# Patient Record
Sex: FEMALE | Race: WHITE | NOT HISPANIC OR LATINO | Employment: FULL TIME | URBAN - METROPOLITAN AREA
[De-identification: names, ages, dates, MRNs, and addresses within clinical notes are randomized per-mention and may not be internally consistent; named-entity substitution may affect disease eponyms.]

---

## 2017-01-16 ENCOUNTER — TELEPHONE (OUTPATIENT)
Dept: FAMILY MEDICINE | Facility: CLINIC | Age: 22
End: 2017-01-16

## 2017-01-16 DIAGNOSIS — Z30.9 ENCOUNTER FOR CONTRACEPTIVE MANAGEMENT, UNSPECIFIED CONTRACEPTIVE ENCOUNTER TYPE: ICD-10-CM

## 2017-01-17 RX ORDER — MEDROXYPROGESTERONE ACETATE 150 MG/ML
150 INJECTION, SUSPENSION INTRAMUSCULAR
Status: DISCONTINUED | OUTPATIENT
Start: 2017-01-17 | End: 2018-02-19

## 2017-01-17 NOTE — TELEPHONE ENCOUNTER
Attempted to call pt to set up nurse visit for injection.     Can you replace the order with a new one that has an end date of 1 year as to not create an incident.    I will follow up once you send back.

## 2017-01-17 NOTE — TELEPHONE ENCOUNTER
----- Message from Goran Cantu sent at 1/16/2017  2:03 PM CST -----  Contact: Patient  Patient called requesting appointment for depo injection. Please call back at [phone]. Thanks,

## 2017-01-19 ENCOUNTER — TELEPHONE (OUTPATIENT)
Dept: FAMILY MEDICINE | Facility: CLINIC | Age: 22
End: 2017-01-19

## 2017-01-20 ENCOUNTER — CLINICAL SUPPORT (OUTPATIENT)
Dept: FAMILY MEDICINE | Facility: CLINIC | Age: 22
End: 2017-01-20
Payer: COMMERCIAL

## 2017-01-20 DIAGNOSIS — Z30.9 ENCOUNTER FOR CONTRACEPTIVE MANAGEMENT, UNSPECIFIED CONTRACEPTIVE ENCOUNTER TYPE: Primary | ICD-10-CM

## 2017-01-20 LAB
B-HCG UR QL: NEGATIVE
CTP QC/QA: YES

## 2017-01-20 PROCEDURE — 81025 URINE PREGNANCY TEST: CPT | Mod: S$GLB,,, | Performed by: NURSE PRACTITIONER

## 2017-01-20 PROCEDURE — 96372 THER/PROPH/DIAG INJ SC/IM: CPT | Mod: S$GLB,,, | Performed by: NURSE PRACTITIONER

## 2017-01-20 RX ADMIN — MEDROXYPROGESTERONE ACETATE 150 MG: 150 INJECTION, SUSPENSION INTRAMUSCULAR at 03:01

## 2017-01-20 NOTE — TELEPHONE ENCOUNTER
----- Message from Rashida Weinstein sent at 1/19/2017  2:21 PM CST -----  Contact:  call  //[phone]    Calling to   Book a  Depo  Injection// please call

## 2017-01-20 NOTE — TELEPHONE ENCOUNTER
Pt missed appt for Depo. Pt will need UPT before her injection. Nurse appt sched jose for UPT and Depo .--lp

## 2017-02-16 ENCOUNTER — OFFICE VISIT (OUTPATIENT)
Dept: RHEUMATOLOGY | Facility: CLINIC | Age: 22
End: 2017-02-16
Payer: COMMERCIAL

## 2017-02-16 VITALS
BODY MASS INDEX: 39.57 KG/M2 | SYSTOLIC BLOOD PRESSURE: 100 MMHG | DIASTOLIC BLOOD PRESSURE: 70 MMHG | HEIGHT: 63 IN | WEIGHT: 223.31 LBS | HEART RATE: 64 BPM

## 2017-02-16 DIAGNOSIS — M79.7 FIBROMYALGIA: ICD-10-CM

## 2017-02-16 PROCEDURE — 99999 PR PBB SHADOW E&M-EST. PATIENT-LVL III: CPT | Mod: PBBFAC,,, | Performed by: INTERNAL MEDICINE

## 2017-02-16 PROCEDURE — 99214 OFFICE O/P EST MOD 30 MIN: CPT | Mod: S$GLB,,, | Performed by: INTERNAL MEDICINE

## 2017-02-16 PROCEDURE — 1160F RVW MEDS BY RX/DR IN RCRD: CPT | Mod: S$GLB,,, | Performed by: INTERNAL MEDICINE

## 2017-02-16 RX ORDER — GABAPENTIN 100 MG/1
300 CAPSULE ORAL 2 TIMES DAILY
Qty: 180 CAPSULE | Refills: 11 | Status: SHIPPED | OUTPATIENT
Start: 2017-02-16 | End: 2017-11-22 | Stop reason: SDUPTHER

## 2017-02-16 ASSESSMENT — ROUTINE ASSESSMENT OF PATIENT INDEX DATA (RAPID3)
PSYCHOLOGICAL DISTRESS SCORE: 6.6
TOTAL RAPID3 SCORE: 4.61
MDHAQ FUNCTION SCORE: 1.3
PATIENT GLOBAL ASSESSMENT SCORE: 5.5
PAIN SCORE: 4

## 2017-02-16 NOTE — PROGRESS NOTES
Subjective:          Chief Complaint: Renetta Durham is a 21 y.o. female who had concerns including Disease Management.    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   Noting pain 4/10   Shoulders with ache and pain.      Recently seen with neurology regarding her narcolepsy was diagnosed sometime around 15y/o.  She has been on various medications that seem to exacerbate her anxiety.  She was complaining of widespread musculoskeletal pain.  His mother with a history of fibromyalgia.  Patient was seen in the past by physical medicine and rehabilitation regarding myalgias and sacroiliac joint dysfunction was referred for physical therapy which appear she see physical therapy..  She also received a few trigger point injection   Currently on Celexa and Elavil.  She stopped taking Elavil more recently as she felt sedated the next morning.  Previosuly trialed with baclofen no significant relief. No improvement with Tramadol.  Trazodone did not help with sleep  She has been seen by her psychiatrist Dr. Nixon.  Previously had been on Zoloft.  She does admit to significant marijuana use which may be exacerbating some of her widespread fatigue since her visit with Dr. Nixon states that she has markedly reduced MJ use.  She was endorsing difficulty with sleep or sleep during the daytime.  She has hx of TEA did not tolerate CPAP with rashes.     Patient with aches and pain for shoulders, upper arms, lower arms, around her greater trochanter region, chest , upper back.  Specific swelling of joints no specific morning stiffness or change with or without activity.  She is relatively sedentary. She notes aches with light touch. Patient denies weight loss, rashes, dry eye, dry mouth, nasal or palatal ulcerations,  lymphadenopathy, Raynaud's, hx of  DVT/miscarriages, psoriasis or family hx of psoriasis, rashes, serositis, anemia or other constitutional symptoms.   . She notes fatigue but she was diagnosed with narcolepsy stating most of her episodes involve some hallucinations and extreme fatigue. She notes improvement with Vyvanse but exacerbates anxiety. She did start at [employer]  doing well in business classes, but want to transfer for biology.     REVIEW OF SYSTEMS:    Review of Systems   Constitutional: Positive for malaise/fatigue. Negative for fever and weight loss.   HENT: Negative for sore throat.    Eyes: Negative for double vision, photophobia and redness.   Respiratory: Negative for cough, shortness of breath and wheezing.    Cardiovascular: Negative for chest pain, palpitations and orthopnea.   Gastrointestinal: Negative for abdominal pain, constipation and diarrhea.   Genitourinary: Negative for dysuria, hematuria and urgency.   Musculoskeletal: Positive for joint pain and myalgias. Negative for back pain.   Skin: Negative for rash.   Neurological: Negative for dizziness, tingling, focal weakness, seizures and headaches.   Endo/Heme/Allergies: Does not bruise/bleed easily.   Psychiatric/Behavioral: Negative for depression, hallucinations and suicidal ideas.               Objective:            Past Medical History   Diagnosis Date    Anxiety     Asthma     Depression     Headache(784.0)     Narcolepsy     Obesity, Class II, BMI 35-39.9, no comorbidity 5/7/2016    Urinary tract infection     Vision abnormalities      glasses     Family History   Problem Relation Age of Onset    Mental illness Other     Arthritis Mother     Asthma Sister      half sister    Miscarriages / Stillbirths Sister      half sister    Vision loss Maternal Aunt     Arthritis Maternal Grandmother     Cancer Maternal Grandfather      colon, liver, lungs    Diabetes Paternal Grandmother     Heart disease Paternal Grandmother     Hypertension Paternal  Grandmother     COPD Paternal Grandfather      Social History   Substance Use Topics    Smoking status: Never Smoker    Smokeless tobacco: Never Used    Alcohol use No         Current Outpatient Prescriptions on File Prior to Visit   Medication Sig Dispense Refill    citalopram (CELEXA) 20 MG tablet TAKE 1 & 1/2 TABLETS BY MOUTH ONCE DAILY. 45 tablet 5    diclofenac sodium 1 % Gel Apply 2 g topically as needed.      doxycycline (VIBRA-TABS) 100 MG tablet Take 100 mg by mouth as needed.      fluticasone (FLONASE) 50 mcg/actuation nasal spray 1 spray by Each Nare route once daily. 3 Bottle 3    gabapentin (NEURONTIN) 100 MG capsule Take 1 capsule (100 mg total) by mouth 3 (three) times daily. 90 capsule 6    lisdexamfetamine (VYVANSE) 40 MG Cap Take 1 capsule (40 mg total) by mouth once daily. 30 capsule 0    albuterol 90 mcg/actuation inhaler Inhale 2 puffs into the lungs every 6 (six) hours as needed for Wheezing. 18 g 0    baclofen (LIORESAL) 20 MG tablet Take 1 tablet (20 mg total) by mouth every evening. DO NOT MIX W. ROBAXIN 30 tablet 6    budesonide (PULMICORT FLEXHALER) 90 mcg/actuation AePB Inhale 2 puffs (180 mcg total) into the lungs 2 (two) times daily. 1 each 3    pantoprazole (PROTONIX) 40 MG tablet TAKE 1 TABLET DAILY 90 tablet 3    [DISCONTINUED] amitriptyline (ELAVIL) 50 MG tablet TAKE 1 TABLET (50 MG TOTAL) BY MOUTH EVERY EVENING. 30 tablet 2    [DISCONTINUED] XYREM 500 mg/mL Soln        Current Facility-Administered Medications on File Prior to Visit   Medication Dose Route Frequency Provider Last Rate Last Dose    medroxyPROGESTERone (DEPO-PROVERA) injection 150 mg  150 mg Intramuscular Q90 Days Rhonda Obando NP   150 mg at 10/10/16 1426    medroxyPROGESTERone (DEPO-PROVERA) syringe 150 mg  150 mg Intramuscular Q90 Days Rhonda Obando NP   150 mg at 01/20/17 1512       Vitals:    02/16/17 1417   BP: 100/70   Pulse: 64       Physical Exam:    Physical Exam    Constitutional: She appears well-developed and well-nourished.   HENT:   Nose: No septal deviation.   Mouth/Throat: Mucous membranes are normal. No oral lesions.   Eyes: Pupils are equal, round, and reactive to light. Right conjunctiva is not injected. Left conjunctiva is not injected.   Neck: No JVD present. No thyroid mass and no thyromegaly present.   Cardiovascular: Normal rate, regular rhythm and normal pulses.    No edema   Pulmonary/Chest: Effort normal and breath sounds normal.   Abdominal: Soft. Normal appearance. There is no hepatosplenomegaly.   Musculoskeletal:        Right shoulder: She exhibits tenderness. She exhibits normal range of motion and no swelling.        Left shoulder: She exhibits tenderness. She exhibits normal range of motion and no swelling.        Right elbow: She exhibits normal range of motion and no swelling. Tenderness found. Medial epicondyle tenderness noted.        Left elbow: She exhibits normal range of motion and no swelling. Tenderness found. Medial epicondyle tenderness noted.        Right wrist: She exhibits normal range of motion, no tenderness and no swelling.        Left wrist: She exhibits normal range of motion, no tenderness and no swelling.        Right hip: She exhibits bony tenderness. She exhibits normal range of motion, normal strength and no swelling.        Left hip: She exhibits bony tenderness. She exhibits normal range of motion, no tenderness and no swelling.        Right knee: She exhibits normal range of motion and no swelling. No tenderness found.        Left knee: She exhibits normal range of motion and no swelling. No tenderness found.        Right ankle: She exhibits normal range of motion and no swelling. No tenderness.        Left ankle: She exhibits normal range of motion and no swelling. No tenderness.   10/18 FMS tenderpoints   No joint swelling or tenderness of PIP, MCP, wrist, elbow, shoulder, or knee joints     Lymphadenopathy:     She has  no cervical adenopathy.     She has no axillary adenopathy.   Neurological: She has normal strength and normal reflexes.   Skin: Skin is dry and intact.   Psychiatric: She has a normal mood and affect.             Assessment:       No diagnosis found.       Plan:        Fibromyalgia  -     gabapentin (NEURONTIN) 100 MG capsule; Take 3 capsules (300 mg total) by mouth 2 (two) times daily. Patient given titration instructions do not change for 300mg caps.  Dispense: 180 capsule; Refill: 11     patient with likely fibromyalgia her WPI: 14/ SSS: 10.  Nonrestorative sleep pattern but admittedly going to bed at 0200 getting up at 1100.   Reduced MJ significantly   Trial of Gabapentin at HS start at 100mg x 1 week, then 200mg x1 week and 300mg at HS therafter. I want to be cautious not to oversedate her so no gabapentin in daytime.   I am concerned some of her complaints are behaviorally induced with disrupted sleep schedule and sedentary lifestyle. Patient encouraged to start low impact progressive exercise such as yoga and walking. Provided name of some gyms, but can do this from home with internet.     Return in about 6 months (around 8/16/2017).          30min consultation with greater than 50% spent in counseling, chart review and coordination of care. All questions answered.

## 2017-04-07 ENCOUNTER — CLINICAL SUPPORT (OUTPATIENT)
Dept: FAMILY MEDICINE | Facility: CLINIC | Age: 22
End: 2017-04-07
Payer: COMMERCIAL

## 2017-04-07 VITALS
RESPIRATION RATE: 18 BRPM | HEIGHT: 63 IN | DIASTOLIC BLOOD PRESSURE: 78 MMHG | SYSTOLIC BLOOD PRESSURE: 118 MMHG | BODY MASS INDEX: 38.64 KG/M2 | OXYGEN SATURATION: 98 % | HEART RATE: 88 BPM | WEIGHT: 218.06 LBS | TEMPERATURE: 98 F

## 2017-04-07 DIAGNOSIS — Z30.9 ENCOUNTER FOR CONTRACEPTIVE MANAGEMENT, UNSPECIFIED TYPE: Primary | ICD-10-CM

## 2017-04-07 PROCEDURE — 96372 THER/PROPH/DIAG INJ SC/IM: CPT | Mod: S$GLB,,, | Performed by: NURSE PRACTITIONER

## 2017-04-07 RX ADMIN — MEDROXYPROGESTERONE ACETATE 150 MG: 150 INJECTION, SUSPENSION INTRAMUSCULAR at 03:04

## 2017-04-07 NOTE — MR AVS SNAPSHOT
[facility] ([place], [place])  [address] [address] [address]  [facility] [address]  Phone: [phone]  Fax: [phone]                  Renetta Durham   2017 3:00 PM   Clinical Support    Description:  Female : 1995   Provider:  MICHELE FREEMAN   Department:  [facility] ([place], [place])           Reason for Visit     depo injection                To Do List           Future Appointments        Provider Department Dept Phone    2017 3:00 PM NURSEMICHELE [facility] ([place], [place]) [phone]      Goals (5 Years of Data)     None      [facility]     [facility] On Call Nurse Care Line -  Assistance  Unless otherwise directed by your provider, please contact [facility], our nurse care line that is available for  assistance.     Registered nurses in the [facility] provide: appointment scheduling, clinical advisement, health education, and other advisory services.  Call: [phone] (toll free)               Medications           Message regarding Medications     Verify the changes and/or additions to your medication regime listed below are the same as discussed with your clinician today.  If any of these changes or additions are incorrect, please notify your healthcare provider.        STOP taking these medications     baclofen (LIORESAL) 20 MG tablet Take 1 tablet (20 mg total) by mouth every evening. DO NOT MIX W. ROBAXIN    lisdexamfetamine (VYVANSE) 40 MG Cap Take 1 capsule (40 mg total) by mouth once daily.           Verify that the below list of medications is an accurate representation of the medications you are currently taking.  If none reported, the list may be blank. If incorrect, please contact your healthcare provider. Carry this list with you in case of emergency.           Current Medications     citalopram (CELEXA) 20 MG tablet TAKE 1 & 1/2 TABLETS BY MOUTH ONCE DAILY.    diclofenac sodium 1 % Gel Apply 2 g topically as needed.    gabapentin  "(NEURONTIN) 100 MG capsule Take 3 capsules (300 mg total) by mouth 2 (two) times daily. Patient given titration instructions do not change for 300mg caps.    albuterol 90 mcg/actuation inhaler Inhale 2 puffs into the lungs every 6 (six) hours as needed for Wheezing.    budesonide (PULMICORT FLEXHALER) 90 mcg/actuation AePB Inhale 2 puffs (180 mcg total) into the lungs 2 (two) times daily.    doxycycline (VIBRA-TABS) 100 MG tablet Take 100 mg by mouth as needed.    fluticasone (FLONASE) 50 mcg/actuation nasal spray 1 spray by Each Nare route once daily.    pantoprazole (PROTONIX) 40 MG tablet TAKE 1 TABLET DAILY           Clinical Reference Information           Your Vitals Were     BP Pulse Temp Resp Height Weight    118/78 88 98 °F (36.7 °C) 18 5' 3" (1.6 m) 98.9 kg (218 lb 0.6 oz)    SpO2 BMI             98% 38.62 kg/m2         Blood Pressure          Most Recent Value    BP  118/78      Allergies as of 4/7/2017     No Known Drug Allergies      Immunizations Administered on Date of Encounter - 4/7/2017     None      Administrations This Visit     medroxyPROGESTERone (DEPO-PROVERA) syringe 150 mg     Admin Date Action Dose Route Administered By             04/07/2017 Given 150 mg Intramuscular Anusha Stubbs LPN                      Language Assistance Services     ATTENTION: Language assistance services are available, free of charge. Please call [phone].      ATENCIÓN: Si habla español, tiene a neumann disposición servicios gratuitos de asistencia lingüística. Llame al [phone].     CHÚ Ý: N?u b?n nói Ti?ng Vi?t, có các d?ch v? h? tr? ngôn ng? mi?n phí dành cho b?n. G?i s? [phone].         [facility] complies with applicable Federal civil rights laws and does not discriminate on the basis of race, color, national origin, age, disability, or sex.        "

## 2017-04-07 NOTE — TELEPHONE ENCOUNTER
Pt requesting to restart Vyvanse, or something similar to it for her Narcolepsy, original prescriber went out on maternity leave, and she is unsure if she's returned yet.   Informed her she may be required to come in for a visit with you to discuss this refill request.

## 2017-04-10 RX ORDER — ALBUTEROL SULFATE 90 UG/1
2 AEROSOL, METERED RESPIRATORY (INHALATION) EVERY 6 HOURS PRN
Qty: 18 G | Refills: 0 | Status: SHIPPED | OUTPATIENT
Start: 2017-04-10 | End: 2019-04-26

## 2017-04-11 ENCOUNTER — OFFICE VISIT (OUTPATIENT)
Dept: FAMILY MEDICINE | Facility: CLINIC | Age: 22
End: 2017-04-11
Payer: COMMERCIAL

## 2017-04-11 VITALS
HEIGHT: 63 IN | WEIGHT: 219.38 LBS | HEART RATE: 85 BPM | BODY MASS INDEX: 38.87 KG/M2 | DIASTOLIC BLOOD PRESSURE: 71 MMHG | SYSTOLIC BLOOD PRESSURE: 113 MMHG

## 2017-04-11 DIAGNOSIS — Z23 IMMUNIZATION DUE: ICD-10-CM

## 2017-04-11 DIAGNOSIS — F41.1 GAD (GENERALIZED ANXIETY DISORDER): ICD-10-CM

## 2017-04-11 DIAGNOSIS — G47.411 NARCOLEPSY AND CATAPLEXY: Primary | ICD-10-CM

## 2017-04-11 PROCEDURE — 90715 TDAP VACCINE 7 YRS/> IM: CPT | Mod: S$GLB,,, | Performed by: NURSE PRACTITIONER

## 2017-04-11 PROCEDURE — 99214 OFFICE O/P EST MOD 30 MIN: CPT | Mod: S$GLB,,, | Performed by: NURSE PRACTITIONER

## 2017-04-11 PROCEDURE — 90471 IMMUNIZATION ADMIN: CPT | Mod: S$GLB,,, | Performed by: NURSE PRACTITIONER

## 2017-04-11 PROCEDURE — 90472 IMMUNIZATION ADMIN EACH ADD: CPT | Mod: S$GLB,,, | Performed by: NURSE PRACTITIONER

## 2017-04-11 PROCEDURE — 1160F RVW MEDS BY RX/DR IN RCRD: CPT | Mod: S$GLB,,, | Performed by: NURSE PRACTITIONER

## 2017-04-11 PROCEDURE — 90651 9VHPV VACCINE 2/3 DOSE IM: CPT | Mod: S$GLB,,, | Performed by: NURSE PRACTITIONER

## 2017-04-11 RX ORDER — LISDEXAMFETAMINE DIMESYLATE 40 MG/1
40 CAPSULE ORAL DAILY
Qty: 30 CAPSULE | Refills: 0 | Status: SHIPPED | OUTPATIENT
Start: 2017-04-11 | End: 2017-08-18 | Stop reason: SDUPTHER

## 2017-04-11 RX ORDER — LISDEXAMFETAMINE DIMESYLATE 40 MG/1
40 CAPSULE ORAL DAILY
Qty: 30 CAPSULE | Refills: 0 | Status: SHIPPED | OUTPATIENT
Start: 2017-06-11 | End: 2017-08-10 | Stop reason: SDUPTHER

## 2017-04-11 RX ORDER — LISDEXAMFETAMINE DIMESYLATE 40 MG/1
40 CAPSULE ORAL DAILY
Qty: 30 CAPSULE | Refills: 0 | Status: SHIPPED | OUTPATIENT
Start: 2017-05-11 | End: 2017-08-10 | Stop reason: SDUPTHER

## 2017-04-16 NOTE — PROGRESS NOTES
"Subjective:       Patient ID: Renetta Durham is a 21 y.o. female.    Chief Complaint: Follow-up    HPI Comments: The patient is here to obtain a refill on her Vyvanse at which she takes for narcolepsy.  This medication is typically refill by psychiatry who is also treating her for generalized anxiety disorder.  She tells me that her psychiatrist is out on maternity leave and she has tried to go without the medication but she does have a very hard time staying awake without the medication.  She has no side effects to the medication.  She is also on Celexa with good relief.  She denies any side effects to the medication.  No true depression.  No suicidal homicidal ideations.    Review of Systems   Constitutional: Negative for appetite change, chills and fever.   HENT: Negative for ear pain and postnasal drip.    Eyes: Negative for pain and itching.   Respiratory: Negative for chest tightness and shortness of breath.    Gastrointestinal: Negative for abdominal distention and abdominal pain.   Endocrine: Negative for polydipsia and polyuria.   Genitourinary: Negative for difficulty urinating and flank pain.   Skin: Negative for color change and pallor.   Neurological: Negative for light-headedness and headaches.   Hematological: Negative for adenopathy. Does not bruise/bleed easily.   Psychiatric/Behavioral: Negative for agitation.       Objective:       Vitals:    04/11/17 1444   BP: 113/71   Pulse: 85   Weight: 99.5 kg (219 lb 5.7 oz)   Height: 5' 3" (1.6 m)   PainSc:   6       Physical Exam   Constitutional: She is oriented to person, place, and time. She appears well-developed.   Obese female    HENT:   Head: Normocephalic and atraumatic.   Right Ear: External ear normal.   Left Ear: External ear normal.   Nose: Nose normal.   Mouth/Throat: Oropharynx is clear and moist.   Eyes: Conjunctivae are normal. Right eye exhibits no discharge. Left eye exhibits no discharge. No scleral icterus.   Neck: Normal range of " motion. Neck supple. No tracheal deviation present.   Cardiovascular: Normal rate, regular rhythm and normal heart sounds.  Exam reveals no friction rub.    No murmur heard.  Pulmonary/Chest: Effort normal and breath sounds normal. No stridor. No respiratory distress. She has no wheezes. She has no rales. She exhibits no tenderness.   Musculoskeletal: Normal range of motion.   Lymphadenopathy:     She has no cervical adenopathy.   Neurological: She is alert and oriented to person, place, and time.   Skin: Skin is warm and dry.   Psychiatric: She has a normal mood and affect.       Assessment:       1. Narcolepsy and cataplexy    2. TUTU (generalized anxiety disorder)    3. Immunization due        Plan:       Renetta was seen today for follow-up.    Diagnoses and all orders for this visit:    Narcolepsy and cataplexy  -     lisdexamfetamine (VYVANSE) 40 MG Cap; Take 1 capsule (40 mg total) by mouth once daily.  -     lisdexamfetamine (VYVANSE) 40 MG Cap; Take 1 capsule (40 mg total) by mouth once daily.  -     lisdexamfetamine (VYVANSE) 40 MG Cap; Take 1 capsule (40 mg total) by mouth once daily.    TUTU (generalized anxiety disorder)  Continue Celexa    Immunization due  -     HPV Vaccine (9-Valent) (3 Dose) (IM)  -     Tdap Vaccine    Follow-up with psychiatry when she returns and or sleep specialist for continued treatment of narcolepsy.

## 2017-06-12 RX ORDER — CITALOPRAM 20 MG/1
TABLET, FILM COATED ORAL
Qty: 45 TABLET | Refills: 2 | Status: SHIPPED | OUTPATIENT
Start: 2017-06-12 | End: 2017-08-10 | Stop reason: SDUPTHER

## 2017-06-27 ENCOUNTER — CLINICAL SUPPORT (OUTPATIENT)
Dept: FAMILY MEDICINE | Facility: CLINIC | Age: 22
End: 2017-06-27
Payer: COMMERCIAL

## 2017-06-27 PROCEDURE — 96372 THER/PROPH/DIAG INJ SC/IM: CPT | Mod: S$GLB,,, | Performed by: FAMILY MEDICINE

## 2017-06-27 RX ADMIN — MEDROXYPROGESTERONE ACETATE 150 MG: 150 INJECTION, SUSPENSION INTRAMUSCULAR at 03:06

## 2017-08-10 ENCOUNTER — OFFICE VISIT (OUTPATIENT)
Dept: FAMILY MEDICINE | Facility: CLINIC | Age: 22
End: 2017-08-10
Payer: COMMERCIAL

## 2017-08-10 ENCOUNTER — TELEPHONE (OUTPATIENT)
Dept: FAMILY MEDICINE | Facility: CLINIC | Age: 22
End: 2017-08-10

## 2017-08-10 VITALS
BODY MASS INDEX: 39.9 KG/M2 | TEMPERATURE: 98 F | OXYGEN SATURATION: 98 % | DIASTOLIC BLOOD PRESSURE: 72 MMHG | RESPIRATION RATE: 17 BRPM | HEIGHT: 63 IN | WEIGHT: 225.19 LBS | SYSTOLIC BLOOD PRESSURE: 114 MMHG | HEART RATE: 89 BPM

## 2017-08-10 DIAGNOSIS — Z79.899 ENCOUNTER FOR LONG-TERM CURRENT USE OF MEDICATION: ICD-10-CM

## 2017-08-10 DIAGNOSIS — H53.8 BLURRY VISION: ICD-10-CM

## 2017-08-10 DIAGNOSIS — E66.9 OBESITY (BMI 30-39.9): ICD-10-CM

## 2017-08-10 DIAGNOSIS — Z00.00 LABORATORY EXAM ORDERED AS PART OF ROUTINE GENERAL MEDICAL EXAMINATION: ICD-10-CM

## 2017-08-10 DIAGNOSIS — Z11.3 SCREEN FOR STD (SEXUALLY TRANSMITTED DISEASE): ICD-10-CM

## 2017-08-10 DIAGNOSIS — Z00.00 ROUTINE PHYSICAL EXAMINATION: Primary | ICD-10-CM

## 2017-08-10 PROCEDURE — 87591 N.GONORRHOEAE DNA AMP PROB: CPT

## 2017-08-10 PROCEDURE — 90471 IMMUNIZATION ADMIN: CPT | Mod: S$GLB,,, | Performed by: NURSE PRACTITIONER

## 2017-08-10 PROCEDURE — 90651 9VHPV VACCINE 2/3 DOSE IM: CPT | Mod: S$GLB,,, | Performed by: NURSE PRACTITIONER

## 2017-08-10 PROCEDURE — 99395 PREV VISIT EST AGE 18-39: CPT | Mod: 25,S$GLB,, | Performed by: NURSE PRACTITIONER

## 2017-08-10 NOTE — TELEPHONE ENCOUNTER
Pt needs to schedule a consult for diminished vision in R eye. I am unable to schedule this pt. Can you call pt with a NP consult appt. Thanks JOHN Alcazar LPN

## 2017-08-10 NOTE — PROGRESS NOTES
"Subjective:       Patient ID: Renetta Durham is a 22 y.o. female.    Chief Complaint: Annual Exam (Would like to have labs done as well)    The patient is here for a routine physical.  She does tell me she has been having blurry vision on and off for the past couple months and this has not improved.  She denies any headaches.  She does have a history of narcolepsy which has been stable with Vyvanse.  She is followed by psychiatry as well for TUTU/MDD.  She is currently on Celexa without any complaints.  No suicidal homicidal ideations.      Review of Systems   Constitutional: Negative for activity change and appetite change.        Unable to loose weight    HENT: Negative for congestion, postnasal drip, rhinorrhea and sinus pressure.    Eyes: Negative for pain and redness.        Blurry vision, on and off for a few months      Respiratory: Negative for choking and chest tightness.    Gastrointestinal: Negative for abdominal distention, abdominal pain, blood in stool, constipation, diarrhea, nausea and vomiting.   Endocrine: Negative for polydipsia and polyphagia.   Genitourinary: Negative for dysuria and hematuria.   Musculoskeletal: Negative for arthralgias and myalgias.   Skin: Negative for color change and rash.   Neurological: Negative for dizziness and headaches.   Psychiatric/Behavioral: Negative for agitation and behavioral problems.       Objective:       Vitals:    08/10/17 1407   BP: 114/72   Pulse: 89   Resp: 17   Temp: 98.2 °F (36.8 °C)   TempSrc: Oral   SpO2: 98%   Weight: 102.2 kg (225 lb 3.2 oz)   Height: 5' 3" (1.6 m)   PainSc:   7   PainLoc: Ankle       Physical Exam   Constitutional: She is oriented to person, place, and time. She appears well-developed. No distress.   Obese female    HENT:   Head: Normocephalic and atraumatic.   Right Ear: Hearing, tympanic membrane, external ear and ear canal normal.   Left Ear: Hearing, tympanic membrane, external ear and ear canal normal.   Nose: Nose " normal.   Mouth/Throat: Uvula is midline, oropharynx is clear and moist and mucous membranes are normal.   Eyes: Conjunctivae and EOM are normal. Pupils are equal, round, and reactive to light. Right eye exhibits no discharge. Left eye exhibits no discharge.   Neck: Trachea normal and normal range of motion. Neck supple. No JVD present. Carotid bruit is not present. No thyromegaly present.   Cardiovascular: Normal rate and regular rhythm.  Exam reveals no gallop and no friction rub.    No murmur heard.  Pulmonary/Chest: Effort normal and breath sounds normal. No respiratory distress. She has no wheezes. She has no rales. She exhibits no tenderness.   Abdominal: Soft. Bowel sounds are normal. She exhibits no distension and no mass. There is no tenderness. There is no rebound and no guarding.   Musculoskeletal: Normal range of motion.   Neurological: She is alert and oriented to person, place, and time. Coordination normal.   Skin: Skin is warm and dry. She is not diaphoretic.   Psychiatric: She has a normal mood and affect. Her behavior is normal. Judgment and thought content normal.       Assessment:       1. Routine physical examination    2. Screen for STD (sexually transmitted disease)    3. Encounter for long-term current use of medication    4. Laboratory exam ordered as part of routine general medical examination    5. Blurry vision        Plan:       Renetta was seen today for annual exam.    Diagnoses and all orders for this visit:    Routine physical examination    Screen for STD (sexually transmitted disease)  -     HPV Vaccine (9-Valent) (3 Dose) (IM)  -     C. trachomatis/N. gonorrhoeae by AMP DNA Urine    Encounter for long-term current use of medication    Laboratory exam ordered as part of routine general medical examination  -     CBC auto differential; Future  -     Comprehensive metabolic panel; Future  -     Hemoglobin A1c; Future  -     Lipid panel; Future  -     TSH; Future  -     Vitamin D;  Future    Blurry vision  -     Ambulatory consult to Optometry

## 2017-08-11 LAB
C TRACH DNA SPEC QL NAA+PROBE: NOT DETECTED
N GONORRHOEA DNA SPEC QL NAA+PROBE: NOT DETECTED

## 2017-08-14 ENCOUNTER — LAB VISIT (OUTPATIENT)
Dept: LAB | Facility: HOSPITAL | Age: 22
End: 2017-08-14
Attending: NURSE PRACTITIONER
Payer: COMMERCIAL

## 2017-08-14 DIAGNOSIS — Z00.00 LABORATORY EXAM ORDERED AS PART OF ROUTINE GENERAL MEDICAL EXAMINATION: ICD-10-CM

## 2017-08-14 LAB
25(OH)D3+25(OH)D2 SERPL-MCNC: 16 NG/ML
ALBUMIN SERPL BCP-MCNC: 4 G/DL
ALP SERPL-CCNC: 93 U/L
ALT SERPL W/O P-5'-P-CCNC: 24 U/L
ANION GAP SERPL CALC-SCNC: 10 MMOL/L
AST SERPL-CCNC: 17 U/L
BASOPHILS # BLD AUTO: 0.03 K/UL
BASOPHILS NFR BLD: 0.3 %
BILIRUB SERPL-MCNC: 0.7 MG/DL
BUN SERPL-MCNC: 9 MG/DL
CALCIUM SERPL-MCNC: 10 MG/DL
CHLORIDE SERPL-SCNC: 107 MMOL/L
CHOLEST/HDLC SERPL: 5.7 {RATIO}
CO2 SERPL-SCNC: 25 MMOL/L
CREAT SERPL-MCNC: 0.9 MG/DL
DIFFERENTIAL METHOD: NORMAL
EOSINOPHIL # BLD AUTO: 0.4 K/UL
EOSINOPHIL NFR BLD: 3.6 %
ERYTHROCYTE [DISTWIDTH] IN BLOOD BY AUTOMATED COUNT: 14.4 %
EST. GFR  (AFRICAN AMERICAN): >60 ML/MIN/1.73 M^2
EST. GFR  (NON AFRICAN AMERICAN): >60 ML/MIN/1.73 M^2
GLUCOSE SERPL-MCNC: 90 MG/DL
HCT VFR BLD AUTO: 44.7 %
HDL/CHOLESTEROL RATIO: 17.5 %
HDLC SERPL-MCNC: 194 MG/DL
HDLC SERPL-MCNC: 34 MG/DL
HGB BLD-MCNC: 15.4 G/DL
LDLC SERPL CALC-MCNC: 131 MG/DL
LYMPHOCYTES # BLD AUTO: 2.9 K/UL
LYMPHOCYTES NFR BLD: 30.2 %
MCH RBC QN AUTO: 29.7 PG
MCHC RBC AUTO-ENTMCNC: 34.5 G/DL
MCV RBC AUTO: 86 FL
MONOCYTES # BLD AUTO: 0.7 K/UL
MONOCYTES NFR BLD: 6.7 %
NEUTROPHILS # BLD AUTO: 5.7 K/UL
NEUTROPHILS NFR BLD: 58.9 %
NONHDLC SERPL-MCNC: 160 MG/DL
PLATELET # BLD AUTO: 284 K/UL
PMV BLD AUTO: 11.4 FL
POTASSIUM SERPL-SCNC: 4.2 MMOL/L
PROT SERPL-MCNC: 7.8 G/DL
RBC # BLD AUTO: 5.19 M/UL
SODIUM SERPL-SCNC: 142 MMOL/L
TRIGL SERPL-MCNC: 145 MG/DL
TSH SERPL DL<=0.005 MIU/L-ACNC: 2.02 UIU/ML
WBC # BLD AUTO: 9.72 K/UL

## 2017-08-14 PROCEDURE — 82306 VITAMIN D 25 HYDROXY: CPT

## 2017-08-14 PROCEDURE — 85025 COMPLETE CBC W/AUTO DIFF WBC: CPT

## 2017-08-14 PROCEDURE — 80061 LIPID PANEL: CPT

## 2017-08-14 PROCEDURE — 83036 HEMOGLOBIN GLYCOSYLATED A1C: CPT

## 2017-08-14 PROCEDURE — 36415 COLL VENOUS BLD VENIPUNCTURE: CPT | Mod: PO

## 2017-08-14 PROCEDURE — 80053 COMPREHEN METABOLIC PANEL: CPT

## 2017-08-14 PROCEDURE — 84443 ASSAY THYROID STIM HORMONE: CPT

## 2017-08-15 LAB
ESTIMATED AVG GLUCOSE: 108 MG/DL
HBA1C MFR BLD HPLC: 5.4 %

## 2017-08-16 DIAGNOSIS — E55.9 VITAMIN D DEFICIENCY: Primary | ICD-10-CM

## 2017-08-16 NOTE — TELEPHONE ENCOUNTER
Labs fine, vitamin D very low, I am sending rx vitamin D--I want her to take vit d 3 OTC as well 2000 IUs daily as well.  Rx vitamin d for 8 weeks only  Recheck vitamin d in 3 months

## 2017-08-18 RX ORDER — LISDEXAMFETAMINE DIMESYLATE 40 MG/1
40 CAPSULE ORAL DAILY
Qty: 30 CAPSULE | Refills: 0 | Status: CANCELLED | OUTPATIENT
Start: 2017-08-18

## 2017-08-18 NOTE — TELEPHONE ENCOUNTER
Spoke to pt. States understanding to take Vitamin D3 2000ius daily and to  her rx for vitamin D3 that Leila sent to the pharmacy. Pt is to have labs drawn in 3 months to check on vitamin D levels. Pt also requesting a refill on her Vyvanse. States her last refill was in June. Please advise.

## 2017-08-19 RX ORDER — LISDEXAMFETAMINE DIMESYLATE 40 MG/1
40 CAPSULE ORAL DAILY
Qty: 30 CAPSULE | Refills: 0 | Status: SHIPPED | OUTPATIENT
Start: 2017-08-19 | End: 2018-10-24

## 2017-08-25 ENCOUNTER — PATIENT MESSAGE (OUTPATIENT)
Dept: FAMILY MEDICINE | Facility: CLINIC | Age: 22
End: 2017-08-25

## 2017-08-28 RX ORDER — ERGOCALCIFEROL 1.25 MG/1
50000 CAPSULE ORAL
Qty: 8 CAPSULE | Refills: 0 | Status: SHIPPED | OUTPATIENT
Start: 2017-08-28 | End: 2017-10-23 | Stop reason: SDUPTHER

## 2017-08-28 NOTE — TELEPHONE ENCOUNTER
rx D sent, not sure what happened, start D3 at home 2000 IUs as well---while on rx-schedule recheck D in 3 months

## 2017-09-12 ENCOUNTER — CLINICAL SUPPORT (OUTPATIENT)
Dept: FAMILY MEDICINE | Facility: CLINIC | Age: 22
End: 2017-09-12
Payer: COMMERCIAL

## 2017-09-12 PROCEDURE — 96372 THER/PROPH/DIAG INJ SC/IM: CPT | Mod: S$GLB,,, | Performed by: NURSE PRACTITIONER

## 2017-09-12 RX ADMIN — MEDROXYPROGESTERONE ACETATE 150 MG: 150 INJECTION, SUSPENSION INTRAMUSCULAR at 03:09

## 2017-09-12 NOTE — PROGRESS NOTES
2 pt identifiers used. Pt present for Depo Provera inj. Administered 1 mL to L Upper Outer Quad Gluteus. Pt tolerated well.

## 2017-09-26 ENCOUNTER — OFFICE VISIT (OUTPATIENT)
Dept: OPTOMETRY | Facility: CLINIC | Age: 22
End: 2017-09-26
Payer: COMMERCIAL

## 2017-09-26 DIAGNOSIS — H52.203 MYOPIA WITH ASTIGMATISM, BILATERAL: ICD-10-CM

## 2017-09-26 DIAGNOSIS — H26.9 CATARACT OF RIGHT EYE, UNSPECIFIED CATARACT TYPE: Primary | ICD-10-CM

## 2017-09-26 DIAGNOSIS — H52.13 MYOPIA WITH ASTIGMATISM, BILATERAL: ICD-10-CM

## 2017-09-26 DIAGNOSIS — H43.393 VITREOUS FLOATERS, BILATERAL: ICD-10-CM

## 2017-09-26 PROCEDURE — 92004 COMPRE OPH EXAM NEW PT 1/>: CPT | Mod: S$GLB,,, | Performed by: OPTOMETRIST

## 2017-09-26 PROCEDURE — 92015 DETERMINE REFRACTIVE STATE: CPT | Mod: S$GLB,,, | Performed by: OPTOMETRIST

## 2017-09-26 PROCEDURE — 99999 PR PBB SHADOW E&M-EST. PATIENT-LVL II: CPT | Mod: PBBFAC,,, | Performed by: OPTOMETRIST

## 2017-09-26 NOTE — PROGRESS NOTES
HPI     Blurred Vision    Additional comments: ref by Leila Obando for sudden decrease VA OD only over   last few months -- pt had exam 5-17 outside [facility] for updated rx, feels   VA has gotten worse since then           Dry Eye    Additional comments: +ATS prn           Comments   Agree above  Notes h/o cataract dx vs not---3rd opinion  Longstanding reduced vision OD         Last edited by ELISA Park, OD on 9/26/2017  4:33 PM. (History)        ROS     Positive for: Eyes    Negative for: Constitutional, Gastrointestinal, Neurological, Skin,   Genitourinary, Musculoskeletal, HENT, Endocrine, Cardiovascular,   Respiratory, Psychiatric, Allergic/Imm, Heme/Lymph    Last edited by ELISA Park, OD on 9/26/2017  2:10 PM. (History)        Assessment /Plan     For exam results, see Encounter Report.    Cataract of right eye, unspecified cataract type    Vitreous floaters, bilateral    Myopia with astigmatism, bilateral      1. Not visually significant  Congenital vs mildly traumatic cataract--monitor  2. Mild OU, RD precautions given  3. Updated specs rx, gave copy, fill prn    Updated clrx, will charge fitting at full exam next year    Discussed and educated patient on current findings /plan.  RTC 1 year, prn if any changes / issues

## 2017-09-26 NOTE — LETTER
September 26, 2017      [place]  [address]  [facility] [address]           [place] [facility]  [address] [facility] [address] [place] [address]  Phone: [phone]  Fax: [phone]          Patient: Renetta Durham   MR Number: [id number]   YOB: 1995   Date of Visit: 9/26/2017       Dear Rhonda Obando:    Thank you for referring Renetta Durham to me for evaluation. Attached you will find relevant portions of my assessment and plan of care.    If you have questions, please do not hesitate to call me. I look forward to following Renetta Durham along with you.    Sincerely,    ELISA Park, OD    Enclosure  CC:  No Recipients    If you would like to receive this communication electronically, please contact [email or web address] or [phone] to request more information on [facility] access.    For providers and/or their staff who would like to refer a patient to [facility], please contact us through our one-stop-shop provider referral line, [facility] , at [phone].    If you feel you have received this communication in error or would no longer like to receive these types of communications, please e-mail [email or web address]

## 2017-10-23 ENCOUNTER — PATIENT MESSAGE (OUTPATIENT)
Dept: FAMILY MEDICINE | Facility: CLINIC | Age: 22
End: 2017-10-23

## 2017-10-23 RX ORDER — CITALOPRAM 20 MG/1
TABLET, FILM COATED ORAL
Qty: 45 TABLET | Refills: 5 | Status: SHIPPED | OUTPATIENT
Start: 2017-10-23 | End: 2018-04-16 | Stop reason: SDUPTHER

## 2017-10-23 RX ORDER — ERGOCALCIFEROL 1.25 MG/1
50000 CAPSULE ORAL
Qty: 8 CAPSULE | Refills: 0 | Status: SHIPPED | OUTPATIENT
Start: 2017-10-23 | End: 2017-11-17 | Stop reason: SDUPTHER

## 2017-10-23 RX ORDER — CITALOPRAM 20 MG/1
TABLET, FILM COATED ORAL
Qty: 45 TABLET | Refills: 2 | OUTPATIENT
Start: 2017-10-23

## 2017-11-17 RX ORDER — ERGOCALCIFEROL 1.25 MG/1
50000 CAPSULE ORAL
Qty: 8 CAPSULE | Refills: 0 | Status: SHIPPED | OUTPATIENT
Start: 2017-11-17 | End: 2018-01-10 | Stop reason: SDUPTHER

## 2017-11-22 ENCOUNTER — INITIAL CONSULT (OUTPATIENT)
Dept: RHEUMATOLOGY | Facility: CLINIC | Age: 22
End: 2017-11-22
Payer: COMMERCIAL

## 2017-11-22 VITALS
DIASTOLIC BLOOD PRESSURE: 80 MMHG | HEART RATE: 60 BPM | WEIGHT: 228.19 LBS | BODY MASS INDEX: 40.43 KG/M2 | SYSTOLIC BLOOD PRESSURE: 100 MMHG | HEIGHT: 63 IN

## 2017-11-22 DIAGNOSIS — M79.7 FIBROMYALGIA: Primary | ICD-10-CM

## 2017-11-22 PROCEDURE — 99214 OFFICE O/P EST MOD 30 MIN: CPT | Mod: S$GLB,,, | Performed by: INTERNAL MEDICINE

## 2017-11-22 PROCEDURE — 99999 PR PBB SHADOW E&M-EST. PATIENT-LVL III: CPT | Mod: PBBFAC,,, | Performed by: INTERNAL MEDICINE

## 2017-11-22 RX ORDER — CYCLOBENZAPRINE HCL 5 MG
5 TABLET ORAL DAILY PRN
Qty: 15 TABLET | Refills: 3 | Status: SHIPPED | OUTPATIENT
Start: 2017-11-22 | End: 2018-02-02 | Stop reason: SDUPTHER

## 2017-11-22 RX ORDER — GABAPENTIN 100 MG/1
300 CAPSULE ORAL 2 TIMES DAILY
Qty: 180 CAPSULE | Refills: 11 | Status: SHIPPED | OUTPATIENT
Start: 2017-11-22 | End: 2018-05-22

## 2017-11-22 NOTE — PROGRESS NOTES
Subjective:          Chief Complaint: Renetta Durham is a 22 y.o. female who had concerns including Disease Management.    HPI:    She is a 22-year-old female she was referred over a year ago from Dr. Schreiber for various arthralgias possible fibromyalgia she is currently on   Celexa 20 mg daily  Gabapentin 300mg BID  Hx of TUTU/MDD      REVIEW OF SYSTEMS:    ROS            Objective:            Past Medical History:   Diagnosis Date    Anxiety     Asthma     Depression     Headache(784.0)     Narcolepsy     Obesity, Class II, BMI 35-39.9, no comorbidity 5/7/2016    Urinary tract infection     Vision abnormalities     glasses     Family History   Problem Relation Age of Onset    Mental illness Other     Arthritis Mother     Asthma Sister      half sister    Miscarriages / Stillbirths Sister      half sister    Vision loss Maternal Aunt     Arthritis Maternal Grandmother     Cancer Maternal Grandfather      colon, liver, lungs    Diabetes Paternal Grandmother     Heart disease Paternal Grandmother     Hypertension Paternal Grandmother     COPD Paternal Grandfather      Social History   Substance Use Topics    Smoking status: Never Smoker    Smokeless tobacco: Never Used    Alcohol use No         Current Outpatient Prescriptions on File Prior to Visit   Medication Sig Dispense Refill    albuterol 90 mcg/actuation inhaler Inhale 2 puffs into the lungs every 6 (six) hours as needed for Wheezing. 18 g 0    budesonide (PULMICORT FLEXHALER) 90 mcg/actuation AePB Inhale 2 puffs (180 mcg total) into the lungs 2 (two) times daily. 1 each 3    citalopram (CELEXA) 20 MG tablet TAKE 1 & 1/2 TABLETS BY MOUTH ONCE DAILY. 45 tablet 5    doxycycline (VIBRA-TABS) 100 MG tablet Take 100 mg by mouth as needed.      fluticasone (FLONASE) 50 mcg/actuation nasal spray 1 spray by Each Nare route once daily. 3 Bottle 3    gabapentin (NEURONTIN) 100 MG capsule Take 3 capsules (300 mg total) by mouth 2 (two) times  daily. Patient given titration instructions do not change for 300mg caps. 180 capsule 11    ibuprofen (ADVIL,MOTRIN) 600 MG tablet Take 1 tablet (600 mg total) by mouth every 6 (six) hours as needed for Pain. 20 tablet 0    lisdexamfetamine (VYVANSE) 40 MG Cap Take 1 capsule (40 mg total) by mouth once daily. 30 capsule 0    naproxen sodium (ANAPROX) 550 MG tablet Take 1 tablet (550 mg total) by mouth 2 (two) times daily with meals. 20 tablet 0    VITAMIN D2 50,000 unit capsule TAKE 1 CAPSULE (50,000 UNITS TOTAL) BY MOUTH EVERY 7 DAYS. 8 capsule 0     Current Facility-Administered Medications on File Prior to Visit   Medication Dose Route Frequency Provider Last Rate Last Dose    medroxyPROGESTERone (DEPO-PROVERA) injection 150 mg  150 mg Intramuscular Q90 Days Rhonda Obando NP   150 mg at 10/10/16 1426    medroxyPROGESTERone (DEPO-PROVERA) syringe 150 mg  150 mg Intramuscular Q90 Days Rhonda Obando NP   150 mg at 09/12/17 1514       Vitals:    11/22/17 0911   BP: 100/80   Pulse: 60       Physical Exam:    Physical Exam          Assessment:       No diagnosis found.       Plan:        There are no diagnoses linked to this encounter.    No Follow-up on file.

## 2017-11-22 NOTE — PROGRESS NOTES
Subjective:          Chief Complaint: Renetta Durham is a 22 y.o. female who had concerns including Disease Management.    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   Noting pain 4610   Took semester off. But actually doing well. Did have increase in flares.       Recently seen with neurology regarding her narcolepsy was diagnosed sometime around 15y/o.  She has been on various medications that seem to exacerbate her anxiety.  She was complaining of widespread musculoskeletal pain.  His mother with a history of fibromyalgia.  Patient was seen in the past by physical medicine and rehabilitation regarding myalgias and sacroiliac joint dysfunction was referred for physical therapy which appear she see physical therapy..  She also received a few trigger point injection   Currently on Celexa and Elavil.  She stopped taking Elavil more recently as she felt sedated the next morning.  Previosuly trialed with baclofen no significant relief. No improvement with Tramadol.  Trazodone did not help with sleep  She has been seen by her psychiatrist Dr. Nixon.  Previously had been on Zoloft.  She does admit to significant marijuana use which may be exacerbating some of her widespread fatigue since her visit with Dr. Nixon states that she has markedly reduced MJ use.  She was endorsing difficulty with sleep or sleep during the daytime.  She has hx of TEA did not tolerate CPAP with rashes.     Patient with aches and pain for shoulders, upper arms, lower arms, around her greater trochanter region, chest , upper back.  Specific swelling of joints no specific morning stiffness or change with or without activity.  She is relatively sedentary. She notes aches with light touch. Patient denies weight loss, rashes, dry eye, dry mouth, nasal or palatal  ulcerations,  lymphadenopathy, Raynaud's, hx of DVT/miscarriages, psoriasis or family hx of psoriasis, rashes, serositis, anemia or other constitutional symptoms.   . She notes fatigue but she was diagnosed with narcolepsy stating most of her episodes involve some hallucinations and extreme fatigue. She notes improvement with Vyvanse but exacerbates anxiety. She did start at [employer]  doing well in business classes, but want to transfer for biology.     REVIEW OF SYSTEMS:    Review of Systems   Constitutional: Positive for malaise/fatigue. Negative for fever and weight loss.   HENT: Negative for sore throat.    Eyes: Negative for double vision, photophobia and redness.   Respiratory: Negative for cough, shortness of breath and wheezing.    Cardiovascular: Negative for chest pain, palpitations and orthopnea.   Gastrointestinal: Negative for abdominal pain, constipation and diarrhea.   Genitourinary: Negative for dysuria, hematuria and urgency.   Musculoskeletal: Positive for joint pain and myalgias. Negative for back pain.   Skin: Negative for rash.   Neurological: Negative for dizziness, tingling, focal weakness, seizures and headaches.   Endo/Heme/Allergies: Does not bruise/bleed easily.   Psychiatric/Behavioral: Negative for depression, hallucinations and suicidal ideas.               Objective:            Past Medical History:   Diagnosis Date    Anxiety     Asthma     Depression     Headache(784.0)     Narcolepsy     Obesity, Class II, BMI 35-39.9, no comorbidity 5/7/2016    Urinary tract infection     Vision abnormalities     glasses     Family History   Problem Relation Age of Onset    Mental illness Other     Arthritis Mother     Asthma Sister      half sister    Miscarriages / Stillbirths Sister      half sister    Vision loss Maternal Aunt     Arthritis Maternal Grandmother     Cancer Maternal Grandfather      colon, liver, lungs    Diabetes Paternal Grandmother     Heart disease  Paternal Grandmother     Hypertension Paternal Grandmother     COPD Paternal Grandfather      Social History   Substance Use Topics    Smoking status: Never Smoker    Smokeless tobacco: Never Used    Alcohol use No         Current Outpatient Prescriptions on File Prior to Visit   Medication Sig Dispense Refill    albuterol 90 mcg/actuation inhaler Inhale 2 puffs into the lungs every 6 (six) hours as needed for Wheezing. 18 g 0    budesonide (PULMICORT FLEXHALER) 90 mcg/actuation AePB Inhale 2 puffs (180 mcg total) into the lungs 2 (two) times daily. 1 each 3    citalopram (CELEXA) 20 MG tablet TAKE 1 & 1/2 TABLETS BY MOUTH ONCE DAILY. 45 tablet 5    doxycycline (VIBRA-TABS) 100 MG tablet Take 100 mg by mouth as needed.      fluticasone (FLONASE) 50 mcg/actuation nasal spray 1 spray by Each Nare route once daily. 3 Bottle 3    gabapentin (NEURONTIN) 100 MG capsule Take 3 capsules (300 mg total) by mouth 2 (two) times daily. Patient given titration instructions do not change for 300mg caps. 180 capsule 11    ibuprofen (ADVIL,MOTRIN) 600 MG tablet Take 1 tablet (600 mg total) by mouth every 6 (six) hours as needed for Pain. 20 tablet 0    lisdexamfetamine (VYVANSE) 40 MG Cap Take 1 capsule (40 mg total) by mouth once daily. 30 capsule 0    naproxen sodium (ANAPROX) 550 MG tablet Take 1 tablet (550 mg total) by mouth 2 (two) times daily with meals. 20 tablet 0    VITAMIN D2 50,000 unit capsule TAKE 1 CAPSULE (50,000 UNITS TOTAL) BY MOUTH EVERY 7 DAYS. 8 capsule 0     Current Facility-Administered Medications on File Prior to Visit   Medication Dose Route Frequency Provider Last Rate Last Dose    medroxyPROGESTERone (DEPO-PROVERA) injection 150 mg  150 mg Intramuscular Q90 Days Rhonda Obando NP   150 mg at 10/10/16 1426    medroxyPROGESTERone (DEPO-PROVERA) syringe 150 mg  150 mg Intramuscular Q90 Days Rhonda Obando NP   150 mg at 09/12/17 1514       Vitals:    11/22/17 0911   BP: 100/80    Pulse: 60       Physical Exam:    Physical Exam   Constitutional: She appears well-developed and well-nourished.   HENT:   Nose: No septal deviation.   Mouth/Throat: Mucous membranes are normal. No oral lesions.   Eyes: Pupils are equal, round, and reactive to light. Right conjunctiva is not injected. Left conjunctiva is not injected.   Neck: No JVD present. No thyroid mass and no thyromegaly present.   Cardiovascular: Normal rate, regular rhythm and normal pulses.    No edema   Pulmonary/Chest: Effort normal and breath sounds normal.   Abdominal: Soft. Normal appearance. There is no hepatosplenomegaly.   Musculoskeletal:        Right shoulder: She exhibits tenderness. She exhibits normal range of motion and no swelling.        Left shoulder: She exhibits tenderness. She exhibits normal range of motion and no swelling.        Right elbow: She exhibits normal range of motion and no swelling. Tenderness found. Medial epicondyle tenderness noted.        Left elbow: She exhibits normal range of motion and no swelling. Tenderness found. Medial epicondyle tenderness noted.        Right wrist: She exhibits normal range of motion, no tenderness and no swelling.        Left wrist: She exhibits normal range of motion, no tenderness and no swelling.        Right hip: She exhibits bony tenderness. She exhibits normal range of motion, normal strength and no swelling.        Left hip: She exhibits bony tenderness. She exhibits normal range of motion, no tenderness and no swelling.        Right knee: She exhibits normal range of motion and no swelling. No tenderness found.        Left knee: She exhibits normal range of motion and no swelling. No tenderness found.        Right ankle: She exhibits normal range of motion and no swelling. No tenderness.        Left ankle: She exhibits normal range of motion and no swelling. No tenderness.   10/18 FMS tenderpoints   No joint swelling or tenderness of PIP, MCP, wrist, elbow, shoulder,  or knee joints     Lymphadenopathy:     She has no cervical adenopathy.     She has no axillary adenopathy.   Neurological: She has normal strength and normal reflexes.   Skin: Skin is dry and intact.   Psychiatric: She has a normal mood and affect.             Assessment:       Encounter Diagnosis   Name Primary?    Fibromyalgia Yes          Plan:        Fibromyalgia  -     Ambulatory Referral to Physical/Occupational Therapy  -     cyclobenzaprine (FLEXERIL) 5 MG tablet; Take 1 tablet (5 mg total) by mouth daily as needed for Muscle spasms.  Dispense: 15 tablet; Refill: 3       patient with  fibromyalgia her WPI: 14/ SSS: 10.  Nonrestorative sleep pattern but admittedly going to bed at 0200 getting up at 1100.     Gabapentin 400mg once daily-ok to increase to 300mg BID or all 600mg at once.   Referral for PT for generalized decondititoning focus on Shoulder girldle and pelvic girdle strengthening.    Return in about 6 months (around 5/22/2018).          30min consultation with greater than 50% spent in counseling, chart review and coordination of care. All questions answered.

## 2017-11-28 ENCOUNTER — CLINICAL SUPPORT (OUTPATIENT)
Dept: FAMILY MEDICINE | Facility: CLINIC | Age: 22
End: 2017-11-28
Payer: COMMERCIAL

## 2017-11-28 DIAGNOSIS — Z30.42 DEPO-PROVERA CONTRACEPTIVE STATUS: ICD-10-CM

## 2017-11-28 DIAGNOSIS — Z30.9 ENCOUNTER FOR CONTRACEPTIVE MANAGEMENT, UNSPECIFIED TYPE: Primary | ICD-10-CM

## 2017-11-28 PROCEDURE — 96372 THER/PROPH/DIAG INJ SC/IM: CPT | Mod: S$GLB,,, | Performed by: FAMILY MEDICINE

## 2017-11-28 RX ORDER — MEDROXYPROGESTERONE ACETATE 150 MG/ML
150 INJECTION, SUSPENSION INTRAMUSCULAR ONCE
Qty: 1 ML | Refills: 0 | Status: CANCELLED | OUTPATIENT
Start: 2017-11-28 | End: 2017-11-28

## 2017-11-28 RX ORDER — MEDROXYPROGESTERONE ACETATE 150 MG/ML
150 INJECTION, SUSPENSION INTRAMUSCULAR
Status: DISCONTINUED | OUTPATIENT
Start: 2017-11-28 | End: 2018-02-19

## 2017-11-28 RX ADMIN — MEDROXYPROGESTERONE ACETATE 150 MG: 150 INJECTION, SUSPENSION INTRAMUSCULAR at 03:11

## 2017-11-28 NOTE — PROGRESS NOTES
Subjective:       Patient ID: Renetta Durham is a 22 y.o. female.    Chief Complaint: PHN Nurse Visit    HPI  Review of Systems    Objective:      Physical Exam    Assessment:       1. Encounter for contraceptive management, unspecified type        Plan:       ***

## 2017-11-28 NOTE — PROGRESS NOTES
2pt identifiers verified    After obtaining consent, and per orders of Rhonda Obando NP, injection of Depo-Provera given by Laurie Anderson. Patient instructed to remain in clinic for 20 minutes afterwards, and to report any adverse reaction to me immediately.  Pt tolerated well bandage applied next appointment scheduled

## 2017-11-29 ENCOUNTER — OFFICE VISIT (OUTPATIENT)
Dept: FAMILY MEDICINE | Facility: CLINIC | Age: 22
End: 2017-11-29
Payer: COMMERCIAL

## 2017-11-29 VITALS
DIASTOLIC BLOOD PRESSURE: 72 MMHG | TEMPERATURE: 98 F | RESPIRATION RATE: 18 BRPM | SYSTOLIC BLOOD PRESSURE: 118 MMHG | HEART RATE: 76 BPM | WEIGHT: 229.19 LBS | HEIGHT: 63 IN | BODY MASS INDEX: 40.61 KG/M2

## 2017-11-29 DIAGNOSIS — J32.9 SINUSITIS, UNSPECIFIED CHRONICITY, UNSPECIFIED LOCATION: ICD-10-CM

## 2017-11-29 DIAGNOSIS — G47.411 PRIMARY NARCOLEPSY WITH CATAPLEXY: ICD-10-CM

## 2017-11-29 DIAGNOSIS — Z00.00 ROUTINE PHYSICAL EXAMINATION: Primary | ICD-10-CM

## 2017-11-29 PROCEDURE — 90686 IIV4 VACC NO PRSV 0.5 ML IM: CPT | Mod: S$GLB,,, | Performed by: FAMILY MEDICINE

## 2017-11-29 PROCEDURE — 90471 IMMUNIZATION ADMIN: CPT | Mod: S$GLB,,, | Performed by: FAMILY MEDICINE

## 2017-11-29 PROCEDURE — 99395 PREV VISIT EST AGE 18-39: CPT | Mod: 25,S$GLB,, | Performed by: NURSE PRACTITIONER

## 2017-11-29 RX ORDER — AMOXICILLIN AND CLAVULANATE POTASSIUM 875; 125 MG/1; MG/1
1 TABLET, FILM COATED ORAL 2 TIMES DAILY
Qty: 14 TABLET | Refills: 0 | Status: SHIPPED | OUTPATIENT
Start: 2017-11-29 | End: 2017-12-06

## 2017-12-03 NOTE — PROGRESS NOTES
"Subjective:       Patient ID: Renetta Durham is a 22 y.o. female.    Chief Complaint: Annual Exam    The patient is here for routine physical for DMV, hx narcolepsy stable on vyvanse.  She is suffering from URI. See URI ROS.      URI    This is a new problem. Episode onset: 2 weeks. Associated symptoms include congestion, coughing, ear pain, headaches, a plugged ear sensation, rhinorrhea, sinus pain, sneezing, a sore throat and swollen glands. Pertinent negatives include no abdominal pain, chest pain, diarrhea, dysuria, joint pain, joint swelling, nausea, neck pain, rash or vomiting. She has tried antihistamine and decongestant for the symptoms. The treatment provided no relief.     Review of Systems   Constitutional: Positive for appetite change and fatigue. Negative for activity change.   HENT: Positive for congestion, ear pain, postnasal drip, rhinorrhea, sinus pain, sinus pressure, sneezing and sore throat.    Eyes: Negative for pain and redness.   Respiratory: Positive for cough. Negative for choking, chest tightness and shortness of breath.    Cardiovascular: Negative for chest pain and palpitations.   Gastrointestinal: Negative for abdominal distention, abdominal pain, blood in stool, constipation, diarrhea, nausea and vomiting.   Endocrine: Negative for polydipsia and polyphagia.   Genitourinary: Negative for dysuria and hematuria.   Musculoskeletal: Negative for arthralgias, joint pain, joint swelling, myalgias and neck pain.   Skin: Negative for color change, pallor and rash.   Neurological: Positive for headaches. Negative for dizziness and light-headedness.   Psychiatric/Behavioral: Negative for agitation and behavioral problems.       Objective:       Vitals:    11/29/17 1452   BP: 118/72   Pulse: 76   Resp: 18   Temp: 98.2 °F (36.8 °C)   TempSrc: Oral   Weight: 103.9 kg (229 lb 2.7 oz)   Height: 5' 3" (1.6 m)   PainSc:   4   PainLoc: Head       Physical Exam   Constitutional: She is oriented to " person, place, and time. She appears well-developed and well-nourished. No distress.   HENT:   Head: Normocephalic and atraumatic.   Right Ear: Hearing, tympanic membrane, external ear and ear canal normal.   Left Ear: Hearing, tympanic membrane, external ear and ear canal normal.   Nose: Mucosal edema and rhinorrhea present. No nose lacerations or sinus tenderness. Right sinus exhibits maxillary sinus tenderness and frontal sinus tenderness. Left sinus exhibits maxillary sinus tenderness and frontal sinus tenderness.   Mouth/Throat: Uvula is midline and mucous membranes are normal. Posterior oropharyngeal edema and posterior oropharyngeal erythema present.   Eyes: Conjunctivae and EOM are normal. Pupils are equal, round, and reactive to light. Right eye exhibits no discharge. Left eye exhibits no discharge. No scleral icterus.   Neck: Trachea normal and normal range of motion. Neck supple. No JVD present. Carotid bruit is not present. No tracheal deviation present. No thyromegaly present.   Cardiovascular: Normal rate and regular rhythm.  Exam reveals no gallop and no friction rub.    No murmur heard.  Pulmonary/Chest: Effort normal and breath sounds normal. No stridor. No respiratory distress. She has no wheezes. She has no rales. She exhibits no tenderness.   Abdominal: Soft. Bowel sounds are normal. She exhibits no distension and no mass. There is no tenderness. There is no rebound and no guarding.   Musculoskeletal: Normal range of motion.   Lymphadenopathy:     She has cervical adenopathy.   Neurological: She is alert and oriented to person, place, and time. Coordination normal.   Skin: Skin is warm and dry. She is not diaphoretic.   Psychiatric: She has a normal mood and affect. Her behavior is normal. Judgment and thought content normal.       Assessment:       1. Routine physical examination    2. Primary narcolepsy with cataplexy    3. Sinusitis, unspecified chronicity, unspecified location        Plan:        Renetta was seen today for annual exam.    Diagnoses and all orders for this visit:    Routine physical examination/Primary narcolepsy with cataplexy  Paperwork completed for patient    Sinusitis, unspecified chronicity, unspecified location  -     amoxicillin-clavulanate 875-125mg (AUGMENTIN) 875-125 mg per tablet; Take 1 tablet by mouth 2 (two) times daily.  -     Influenza - Quadrivalent (3 years & older) (PF)

## 2017-12-13 RX ORDER — FLUTICASONE PROPIONATE 50 MCG
1 SPRAY, SUSPENSION (ML) NASAL DAILY
Refills: 2 | OUTPATIENT
Start: 2017-12-13

## 2018-01-11 RX ORDER — ERGOCALCIFEROL 1.25 MG/1
50000 CAPSULE ORAL
Qty: 8 CAPSULE | Refills: 0 | Status: SHIPPED | OUTPATIENT
Start: 2018-01-11 | End: 2018-03-19 | Stop reason: SDUPTHER

## 2018-02-02 DIAGNOSIS — M79.7 FIBROMYALGIA: ICD-10-CM

## 2018-02-06 RX ORDER — CYCLOBENZAPRINE HCL 5 MG
TABLET ORAL
COMMUNITY
Start: 2018-01-20 | End: 2018-02-06 | Stop reason: SDUPTHER

## 2018-02-06 NOTE — TELEPHONE ENCOUNTER
----- Message from Sania Douglas sent at 2/6/2018 11:34 AM CST -----  [facility]  pharmacy called regarding Cyclobenzaprine 5 mg for the above patient. There are requesting refill authorization contact pharmacy at [phone]. Thanks.        [facility]/pharmacy #[id number] - [place], [place] - [address] [address] [facility] [address]  [address] [address]  Phone: [phone] Fax: [phone]

## 2018-02-07 RX ORDER — CYCLOBENZAPRINE HCL 5 MG
5 TABLET ORAL DAILY PRN
Qty: 15 TABLET | Refills: 3 | Status: SHIPPED | OUTPATIENT
Start: 2018-02-07 | End: 2018-03-09

## 2018-02-07 RX ORDER — CYCLOBENZAPRINE HCL 5 MG
5 TABLET ORAL
Qty: 30 TABLET | Refills: 1 | Status: SHIPPED | OUTPATIENT
Start: 2018-02-07 | End: 2018-09-26 | Stop reason: SDUPTHER

## 2018-02-19 ENCOUNTER — CLINICAL SUPPORT (OUTPATIENT)
Dept: FAMILY MEDICINE | Facility: CLINIC | Age: 23
End: 2018-02-19
Payer: COMMERCIAL

## 2018-02-19 DIAGNOSIS — Z30.42 ENCOUNTER FOR DEPO-PROVERA CONTRACEPTION: Primary | ICD-10-CM

## 2018-02-19 PROCEDURE — 96372 THER/PROPH/DIAG INJ SC/IM: CPT | Mod: S$GLB,,, | Performed by: FAMILY MEDICINE

## 2018-02-19 RX ORDER — MEDROXYPROGESTERONE ACETATE 150 MG/ML
150 INJECTION, SUSPENSION INTRAMUSCULAR
Status: DISCONTINUED | OUTPATIENT
Start: 2018-02-19 | End: 2018-02-19

## 2018-02-19 RX ORDER — MEDROXYPROGESTERONE ACETATE 150 MG/ML
150 INJECTION, SUSPENSION INTRAMUSCULAR
Status: COMPLETED | OUTPATIENT
Start: 2018-02-19 | End: 2018-02-19

## 2018-02-19 RX ADMIN — MEDROXYPROGESTERONE ACETATE 150 MG: 150 INJECTION, SUSPENSION INTRAMUSCULAR at 03:02

## 2018-03-19 RX ORDER — ERGOCALCIFEROL 1.25 MG/1
50000 CAPSULE ORAL
Qty: 8 CAPSULE | Refills: 0 | Status: SHIPPED | OUTPATIENT
Start: 2018-03-19 | End: 2018-05-14 | Stop reason: SDUPTHER

## 2018-04-16 RX ORDER — CITALOPRAM 20 MG/1
TABLET, FILM COATED ORAL
Qty: 45 TABLET | Refills: 3 | Status: SHIPPED | OUTPATIENT
Start: 2018-04-16 | End: 2018-08-20 | Stop reason: SDUPTHER

## 2018-05-08 ENCOUNTER — TELEPHONE (OUTPATIENT)
Dept: FAMILY MEDICINE | Facility: CLINIC | Age: 23
End: 2018-05-08

## 2018-05-08 NOTE — TELEPHONE ENCOUNTER
----- Message from Cassandra Segura sent at 5/8/2018  2:36 PM CDT -----  Call [phone] ... Asking to schedule depo injection appt

## 2018-05-14 RX ORDER — ERGOCALCIFEROL 1.25 MG/1
50000 CAPSULE ORAL
Qty: 8 CAPSULE | Refills: 0 | Status: SHIPPED | OUTPATIENT
Start: 2018-05-14 | End: 2018-08-07 | Stop reason: SDUPTHER

## 2018-05-15 ENCOUNTER — CLINICAL SUPPORT (OUTPATIENT)
Dept: FAMILY MEDICINE | Facility: CLINIC | Age: 23
End: 2018-05-15
Payer: COMMERCIAL

## 2018-05-15 DIAGNOSIS — Z30.9 ENCOUNTER FOR CONTRACEPTIVE MANAGEMENT, UNSPECIFIED TYPE: Primary | ICD-10-CM

## 2018-05-15 PROCEDURE — 96372 THER/PROPH/DIAG INJ SC/IM: CPT | Mod: S$GLB,,, | Performed by: NURSE PRACTITIONER

## 2018-05-15 RX ORDER — MEDROXYPROGESTERONE ACETATE 150 MG/ML
150 INJECTION, SUSPENSION INTRAMUSCULAR
Status: DISCONTINUED | OUTPATIENT
Start: 2018-05-15 | End: 2018-07-31

## 2018-05-15 RX ADMIN — MEDROXYPROGESTERONE ACETATE 150 MG: 150 INJECTION, SUSPENSION INTRAMUSCULAR at 03:05

## 2018-05-15 NOTE — PROGRESS NOTES
2 pt identifiers used. Pt present for Depo Provera injection. Administered 1 mL to R upper outer quad gluteus. Pt tolerated well. Scheduled next injection. Time and date confirmed with pt. Pt advised to wait in lobby 15 minutes after injection.

## 2018-05-16 ENCOUNTER — OFFICE VISIT (OUTPATIENT)
Dept: OTOLARYNGOLOGY | Facility: CLINIC | Age: 23
End: 2018-05-16
Payer: COMMERCIAL

## 2018-05-16 ENCOUNTER — HOSPITAL ENCOUNTER (OUTPATIENT)
Dept: RADIOLOGY | Facility: HOSPITAL | Age: 23
Discharge: HOME | End: 2018-05-16
Attending: NURSE PRACTITIONER
Payer: COMMERCIAL

## 2018-05-16 VITALS
SYSTOLIC BLOOD PRESSURE: 114 MMHG | HEIGHT: 63 IN | DIASTOLIC BLOOD PRESSURE: 73 MMHG | BODY MASS INDEX: 40.36 KG/M2 | HEART RATE: 93 BPM | WEIGHT: 227.75 LBS

## 2018-05-16 DIAGNOSIS — R51.9 FRONTAL HEADACHE: Primary | ICD-10-CM

## 2018-05-16 DIAGNOSIS — R09.A2 GLOBUS SENSATION: ICD-10-CM

## 2018-05-16 DIAGNOSIS — R51.9 FRONTAL HEADACHE: ICD-10-CM

## 2018-05-16 DIAGNOSIS — K21.9 LPRD (LARYNGOPHARYNGEAL REFLUX DISEASE): ICD-10-CM

## 2018-05-16 DIAGNOSIS — R51.9 CHRONIC DAILY HEADACHE: ICD-10-CM

## 2018-05-16 DIAGNOSIS — R13.10 DYSPHAGIA, UNSPECIFIED TYPE: ICD-10-CM

## 2018-05-16 DIAGNOSIS — R09.89 CHRONIC THROAT CLEARING: ICD-10-CM

## 2018-05-16 PROCEDURE — 99203 OFFICE O/P NEW LOW 30 MIN: CPT | Mod: S$GLB,,, | Performed by: NURSE PRACTITIONER

## 2018-05-16 PROCEDURE — 70220 X-RAY EXAM OF SINUSES: CPT | Mod: TC,FY,PO

## 2018-05-16 PROCEDURE — 99999 PR PBB SHADOW E&M-EST. PATIENT-LVL IV: CPT | Mod: PBBFAC,,, | Performed by: NURSE PRACTITIONER

## 2018-05-16 PROCEDURE — 3008F BODY MASS INDEX DOCD: CPT | Mod: CPTII,S$GLB,, | Performed by: NURSE PRACTITIONER

## 2018-05-16 PROCEDURE — 70220 X-RAY EXAM OF SINUSES: CPT | Mod: 26,,, | Performed by: RADIOLOGY

## 2018-05-16 NOTE — PROGRESS NOTES
Subjective:       Patient ID: Renetta Durham is a 22 y.o. female.    Chief Complaint: Nasal Congestion; post nasal drip; Headache; and Facial Pain    HPI   Patient reports daily frontal headaches localized to medial right brow, daily X 2-3 years. She received two courses of antibiotics last November for sinusitis (first at walk-in clinic, second by Leila Obando); otherwise no other antibiotics in the past year for sinuses. She believes her right frontal sinus is blocked, infected but will not drain. No imaging.   Additionally she reports continuous post-nasal drip, globus sensation, dysphagia, throat clearing, cough. She was diagnosed with LPRD by Dr. Magaña in April 2013. She does not take anything for reflux.       Review of Systems   Constitutional: Negative.    HENT: Positive for congestion, postnasal drip, sinus pain and trouble swallowing. Negative for rhinorrhea, sore throat and voice change.         Inability to smell or taste well  Sensation of thick or too much mucus in the back of the throat  Frequent throat clearing  Sensation of lump or swelling in the back of the throat   Eyes: Negative.    Respiratory: Positive for cough.    Cardiovascular: Negative.    Gastrointestinal: Negative.    Musculoskeletal: Negative.    Skin: Negative.    Neurological: Positive for headaches.   Hematological: Negative.    Psychiatric/Behavioral: Negative.        Objective:      Physical Exam   Constitutional: She is oriented to person, place, and time. Vital signs are normal. She appears well-developed and well-nourished. She is cooperative. She does not appear ill. No distress.   HENT:   Head: Normocephalic and atraumatic.   Right Ear: Hearing, tympanic membrane, external ear and ear canal normal. Tympanic membrane is not erythematous. No middle ear effusion.   Left Ear: Hearing, tympanic membrane, external ear and ear canal normal. Tympanic membrane is not erythematous.  No middle ear effusion.   Nose: No mucosal edema or  rhinorrhea. Right sinus exhibits frontal sinus tenderness. Right sinus exhibits no maxillary sinus tenderness. Left sinus exhibits no maxillary sinus tenderness and no frontal sinus tenderness.       Mouth/Throat: Uvula is midline, oropharynx is clear and moist and mucous membranes are normal. Mucous membranes are not pale, not dry and not cyanotic. No oral lesions. No oropharyngeal exudate, posterior oropharyngeal edema or posterior oropharyngeal erythema.       Eyes: Conjunctivae, EOM and lids are normal. Pupils are equal, round, and reactive to light. Right eye exhibits no discharge. Left eye exhibits no discharge. No scleral icterus.   Neck: Trachea normal and normal range of motion. Neck supple. No tracheal deviation present. No thyroid mass and no thyromegaly present.   Cardiovascular: Normal rate.    Pulmonary/Chest: Effort normal. No stridor. No respiratory distress. She has no wheezes.   Musculoskeletal: Normal range of motion.   Lymphadenopathy:        Head (right side): No submental, no submandibular, no tonsillar, no preauricular and no posterior auricular adenopathy present.        Head (left side): No submental, no submandibular, no tonsillar, no preauricular and no posterior auricular adenopathy present.     She has no cervical adenopathy.        Right cervical: No superficial cervical and no posterior cervical adenopathy present.       Left cervical: No superficial cervical and no posterior cervical adenopathy present.   Neurological: She is alert and oriented to person, place, and time. She has normal strength. Coordination and gait normal.   Skin: Skin is warm, dry and intact. No lesion and no rash noted. She is not diaphoretic. No cyanosis. No pallor.   Psychiatric: She has a normal mood and affect. Her speech is normal and behavior is normal. Judgment and thought content normal. Cognition and memory are normal.   Nursing note and vitals reviewed.      Assessment:     Chronic daily right frontal  headaches X 2+ years    Probable LPRD  Plan:     Sinus x-rays today. (Ideally would prefer CT imaging for better definition, but patient has been diagnosed with one sinus infection requiring treatment in the past year, last November.)  Will call patient with results as soon as available    Discussed typical constellation of symptoms seen with acute allergic rhinitis exacerbation, acute bacterial sinusitis, acute bacterial pharyngitis/tonsillitis, and LPRD/GERD.  Discussed common differentials for chronic cough may include but not limited to: allergic rhinitis (see allergist or take daily allergy meds), silent reflux (see GI or take daily reflux meds), sinusitis (imaging), pulmonary issues (h/o asthma, see pulmonologist).   If sinuses are negative, may need to consider neurologist for evaluation of chronic headache disorder.

## 2018-05-16 NOTE — PATIENT INSTRUCTIONS
If your sinuses are involved (contributing to your cough/throat clearing), then ENT will resolve.  However if your sinuses are shown to be open and clear on imaging, then depending on your other associated symptoms, the best specialist to resolve your cough/throat clearing may be pulmonologist, allergist, or gastroenterologist.     1. Nasal allergies -- Typical constellation of symptoms seen with nasal allergies: itchy, red, watery eyes; itchy, red, watery nose; excessive sneezing; excessive stuffiness. Discuss with your primary care provider whether you should see an allergist or take daily allergy medications.     2. Silent reflux -- Typical constellation of symptoms seen with silent reflux: post-nasal drip sensation with absence of significant runny nose or nasal congestion, sensation of thick or too much mucus in the back of throat, raspy voice, frequent throat clearing, lump in the back of throat, frequent sore throats. Discuss with your primary care provider whether you should see a gastroenterologist or take daily reflux medications.     3. Sinus Infection -- Typical constellation of symptoms seen with acute bacterial sinus infection are:  Green-gold, foul-smelling, foul-tasting mucus from nose and throat, inability to breathe through nose, inability to smell or taste well, facial pain and swelling, dental pain, headaches around eyes, sore throat and productive cough. Sinus imaging may be needed to rule out infection if these symptoms are present.    4. Pulmonary (Lung) issue -- Discuss with your primary care provider whether you should see a pulmonologist (lung specialist).    We will obtain sinus imaging. If sinuses are all open and clear, consider other accompanying symptoms to determine whether you should lean more toward allergy/asthma versus gastro.       How Acid Reflux Affects Your Throat    Do you have to clear your throat or cough often? Are you hoarse? Do you have trouble swallowing? If you have  "these or other throat symptoms, you may have acid reflux. This occurs when stomach acid flows back up and irritates your throat.  Why you have throat symptoms  There are muscles (esophageal sphincters) at both ends of the tube that carries food to your stomach (the esophagus). These muscles relax to let food pass. Then they tighten to keep stomach acid down. When the lower esophageal sphincter (LES) doesnt tighten enough, acid can flow back (reflux) from your stomach into your esophagus. This may cause heartburn. In some cases the upper esophageal sphincter (UES) also doesnt work well. Then acid can travel higher and enter your throat (pharynx). In many cases, this causes throat symptoms.  Common throat symptoms  · Need to clear your throat often  · Feeling like youre choking  · Long-term (chronic) cough  · Hoarseness  · Trouble swallowing  · Feel like you have a lump in your throat  · Sour or acid taste  · Sore throat that keeps coming back     LARYNGOPHARYNGEAL REFLUX  (Dr. Magaña, ENT diagnosed you with SILENT REFLUX in April 2013)    If you have any of the following symptoms you may have laryngopharyngeal reflux (LPR):  hoarseness, thick or too much mucus, chronic throat pain/irritation, chronic throat clearing, chronic cough, especially cough that wake you up from sleep, chronic "postnasal drip" without the need to blow your nose.     Many people with LPR do not have symptoms of heartburn. Compared to the esophagus, the voice box and the back of the throat are significantly more sensitive to the effects of acid on surrounding tissue. Acid passing quickly through the esophagus does not have a chance to irritate the area for too long.  However acid that pools in the throat or voice box can cause prolonged irritation resulting in the symptoms of LPR.    The symptoms of LPR can consist of a dry cough and the sensation of something being stuck in the throat.  Some people will complain of heartburn while others may " "have intermittent hoarseness or loss of voice.  Another major symptom of LPR is "postnasal drip."  Patients are often told symptoms are due to abnormal nasal drainage or sinus infection; however this is rarely the cause of chronic throat irritation. For post nasal drip to cause the complaints described, signs and symptoms of an active nasal infection should be present.     Treatments for LPR include:  postural changes, weight reduction, diet modification, medication to reduce stomach acid and promote normal motility, and surgery to prevent reflux. Most patients will begin to notice some relief in her symptoms about 2 weeks after starting the medication; however it is generally recommended the medication should be continued for 2 months. If the symptoms completely resolve, the medication can then be tapered.  Some people will remain symptom free while others may have relapses which required treatment again.    Things you can do to prevent reflux include:  Do not smoke.  Smoking will cause reflux.  Avoid tight fitting clothes or belts around the waist.  Avoid eating at least 2 hours prior to bedtime.  In fact avoid eating your largest meal at night.  Weight loss.  For patient's with recent weight gain, shedding a few pounds is all that is required to improve reflux.  Avoid caffeine, cola beverages, citrus beverages, mints, alcoholic beverages, particularly at night, cheese, fried foods, spicy foods, eggs, and chocolate.  Sleep with the head of bed elevated at least 6 inches.    Recommendations:    Take Nexium / Prilosec (PPI) every morning on an empty stomach (30-60 minutes before eating) 40 MG.   At bedtime take Zantac (H2-blocker) 150-300 mg.    After 4-8 weeks, with significant symptomatic improvement, you may begin weaning your reflux medications down:  Nexium / Prilosec 40 mg --> to 20 mg (over-the-counter strength)  Zantac 300 mg --> to 150 mg (over-the-counter stength)  See a Gastro doctor (GI) for refractory " symptoms and continued management.

## 2018-05-16 NOTE — LETTER
May 16, 2018      [place]  [address]  [facility] [address]           [facility]  [address] [facility] [address] [place] [address]  Phone: [phone]  Fax: [phone]          Patient: Renetta Durham   MR Number: [id number]   YOB: 1995   Date of Visit: 5/16/2018       Dear Rhonda Obando:    Thank you for referring Renetta Durham to me for evaluation. Attached you will find relevant portions of my assessment and plan of care.    If you have questions, please do not hesitate to call me. I look forward to following Renetta Durham along with you.    Sincerely,    Matilde Calderon NP    Enclosure  CC:  No Recipients    If you would like to receive this communication electronically, please contact [email or web address] or [phone] to request more information on [employer] access.    For providers and/or their staff who would like to refer a patient to [facility], please contact us through our one-stop-shop provider referral line, [facility] , at [phone].    If you feel you have received this communication in error or would no longer like to receive these types of communications, please e-mail [email or web address]

## 2018-05-17 ENCOUNTER — TELEPHONE (OUTPATIENT)
Dept: OTOLARYNGOLOGY | Facility: CLINIC | Age: 23
End: 2018-05-17

## 2018-05-17 NOTE — TELEPHONE ENCOUNTER
----- Message from Matilde Calderon NP sent at 5/17/2018  7:39 AM CDT -----  Sinuses are open and clear. Please see headache specialist for evaluation of daily headache disorder.

## 2018-05-17 NOTE — TELEPHONE ENCOUNTER
----- Message from Goran Cantu sent at 5/17/2018  4:39 PM CDT -----  Contact: patient  Type:  Patient Returning Call    Who Called:  patient  Who Left Message for Patient:  Adrianna  Does the patient know what this is regarding?:  no  Best Call Back Number:  [phone]  Additional Information:  Requesting a call back

## 2018-05-22 ENCOUNTER — OFFICE VISIT (OUTPATIENT)
Dept: RHEUMATOLOGY | Facility: CLINIC | Age: 23
End: 2018-05-22
Payer: COMMERCIAL

## 2018-05-22 VITALS
HEIGHT: 64 IN | WEIGHT: 225.31 LBS | HEART RATE: 64 BPM | DIASTOLIC BLOOD PRESSURE: 80 MMHG | BODY MASS INDEX: 38.47 KG/M2 | SYSTOLIC BLOOD PRESSURE: 120 MMHG

## 2018-05-22 DIAGNOSIS — M79.7 FIBROMYALGIA: Primary | ICD-10-CM

## 2018-05-22 DIAGNOSIS — M25.50 HYPERMOBILITY ARTHRALGIA: ICD-10-CM

## 2018-05-22 PROCEDURE — 3008F BODY MASS INDEX DOCD: CPT | Mod: CPTII,S$GLB,, | Performed by: INTERNAL MEDICINE

## 2018-05-22 PROCEDURE — 99214 OFFICE O/P EST MOD 30 MIN: CPT | Mod: S$GLB,,, | Performed by: INTERNAL MEDICINE

## 2018-05-22 PROCEDURE — 99999 PR PBB SHADOW E&M-EST. PATIENT-LVL III: CPT | Mod: PBBFAC,,, | Performed by: INTERNAL MEDICINE

## 2018-05-22 NOTE — PROGRESS NOTES
Subjective:          Chief Complaint: Renetta Durham is a 22 y.o. female who had concerns including Disease Management.    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   Noting pain 6/10    But actually doing well. Did have increase in flares.    Patient is here today for follow-up of her fibromyalgia she is doing Flexeril PRN and Gabapentin in 600mg at HS.     States she is about the same she is going to the gym which does seem to help she's not sleeping very well.  Hips are painful, upper trapezius      Recently seen with neurology regarding her narcolepsy was diagnosed sometime around 15y/o.  She has been on various medications that seem to exacerbate her anxiety.  She was complaining of widespread musculoskeletal pain.  His mother with a history of fibromyalgia.  Patient was seen in the past by physical medicine and rehabilitation regarding myalgias and sacroiliac joint dysfunction was referred for physical therapy which appear she see physical therapy..  She also received a few trigger point injection   Currently on Celexa and Elavil.  She stopped taking Elavil more recently as she felt sedated the next morning.  Previosuly trialed with baclofen no significant relief. No improvement with Tramadol.  Trazodone did not help with sleep  She has been seen by her psychiatrist Dr. Nixon.  Previously had been on Zoloft.  She does admit to significant marijuana use which may be exacerbating some of her widespread fatigue since her visit with Dr. Nixon states that she has markedly reduced MJ use.  She was endorsing difficulty with sleep or sleep during the daytime.  She has hx of TEA did not tolerate CPAP with rashes.     Patient with aches and pain for shoulders, upper arms, lower arms, around her greater trochanter region, chest , upper back.   Specific swelling of joints no specific morning stiffness or change with or without activity.  She is relatively sedentary. She notes aches with light touch. Patient denies weight loss, rashes, dry eye, dry mouth, nasal or palatal ulcerations,  lymphadenopathy, Raynaud's, hx of DVT/miscarriages, psoriasis or family hx of psoriasis, rashes, serositis, anemia or other constitutional symptoms.   . She notes fatigue but she was diagnosed with narcolepsy stating most of her episodes involve some hallucinations and extreme fatigue. She notes improvement with Vyvanse but exacerbates anxiety. She did start at [employer]  doing well in business classes, but want to transfer for biology.     REVIEW OF SYSTEMS:    Review of Systems   Constitutional: Positive for malaise/fatigue. Negative for fever and weight loss.   HENT: Negative for sore throat.    Eyes: Negative for double vision, photophobia and redness.   Respiratory: Negative for cough, shortness of breath and wheezing.    Cardiovascular: Negative for chest pain, palpitations and orthopnea.   Gastrointestinal: Negative for abdominal pain, constipation and diarrhea.   Genitourinary: Negative for dysuria, hematuria and urgency.   Musculoskeletal: Positive for joint pain and myalgias. Negative for back pain.   Skin: Negative for rash.   Neurological: Negative for dizziness, tingling, focal weakness, seizures and headaches.   Endo/Heme/Allergies: Does not bruise/bleed easily.   Psychiatric/Behavioral: Negative for depression, hallucinations and suicidal ideas.               Objective:            Past Medical History:   Diagnosis Date    Anxiety     Asthma     Depression     Headache(784.0)     Narcolepsy     Obesity, Class II, BMI 35-39.9, no comorbidity 5/7/2016    Urinary tract infection     Vision abnormalities     glasses     Family History   Problem Relation Age of Onset    Mental illness Other     Arthritis Mother     Asthma Sister         half sister     Miscarriages / Stillbirths Sister         half sister    Vision loss Maternal Aunt     Arthritis Maternal Grandmother     Cancer Maternal Grandfather         colon, liver, lungs    Diabetes Paternal Grandmother     Heart disease Paternal Grandmother     Hypertension Paternal Grandmother     COPD Paternal Grandfather      Social History   Substance Use Topics    Smoking status: Never Smoker    Smokeless tobacco: Never Used    Alcohol use No         Current Outpatient Prescriptions on File Prior to Visit   Medication Sig Dispense Refill    citalopram (CELEXA) 20 MG tablet TAKE 1 & 1/2 TABLETS BY MOUTH ONCE DAILY. 45 tablet 3    cyclobenzaprine (FLEXERIL) 5 MG tablet Take 1 tablet (5 mg total) by mouth as needed for Muscle spasms. 30 tablet 1    doxycycline (VIBRA-TABS) 100 MG tablet Take 100 mg by mouth as needed.      fluticasone (FLONASE) 50 mcg/actuation nasal spray 1 spray by Each Nare route once daily. 3 Bottle 3    gabapentin (NEURONTIN) 100 MG capsule Take 3 capsules (300 mg total) by mouth 2 (two) times daily. Patient given titration instructions do not change for 300mg caps. 180 capsule 11    ibuprofen (ADVIL,MOTRIN) 600 MG tablet Take 1 tablet (600 mg total) by mouth every 6 (six) hours as needed for Pain. 20 tablet 0    lisdexamfetamine (VYVANSE) 40 MG Cap Take 1 capsule (40 mg total) by mouth once daily. 30 capsule 0    naproxen sodium (ANAPROX) 550 MG tablet Take 1 tablet (550 mg total) by mouth 2 (two) times daily with meals. 20 tablet 0    VITAMIN D2 50,000 unit capsule TAKE 1 CAPSULE (50,000 UNITS TOTAL) BY MOUTH EVERY 7 DAYS. 8 capsule 0    albuterol 90 mcg/actuation inhaler Inhale 2 puffs into the lungs every 6 (six) hours as needed for Wheezing. 18 g 0     Current Facility-Administered Medications on File Prior to Visit   Medication Dose Route Frequency Provider Last Rate Last Dose    medroxyPROGESTERone (DEPO-PROVERA) injection 150 mg  150 mg Intramuscular Q90 Days  Rhonda Obando, NP   150 mg at 05/15/18 1550       Vitals:    05/22/18 1510   BP: 120/80   Pulse: 64       Physical Exam:    Physical Exam   Constitutional: She appears well-developed and well-nourished.   HENT:   Nose: No septal deviation.   Mouth/Throat: Mucous membranes are normal. No oral lesions.   Eyes: Pupils are equal, round, and reactive to light. Right conjunctiva is not injected. Left conjunctiva is not injected.   Neck: No JVD present. No thyroid mass and no thyromegaly present.   Cardiovascular: Normal rate, regular rhythm and normal pulses.    No edema   Pulmonary/Chest: Effort normal and breath sounds normal.   Abdominal: Soft. Normal appearance. There is no hepatosplenomegaly.   Musculoskeletal:        Right shoulder: She exhibits tenderness. She exhibits normal range of motion and no swelling.        Left shoulder: She exhibits tenderness. She exhibits normal range of motion and no swelling.        Right elbow: She exhibits normal range of motion and no swelling. Tenderness found. Medial epicondyle tenderness noted.        Left elbow: She exhibits normal range of motion and no swelling. Tenderness found. Medial epicondyle tenderness noted.        Right wrist: She exhibits normal range of motion, no tenderness and no swelling.        Left wrist: She exhibits normal range of motion, no tenderness and no swelling.        Right hip: She exhibits bony tenderness. She exhibits normal range of motion, normal strength and no swelling.        Left hip: She exhibits bony tenderness. She exhibits normal range of motion, no tenderness and no swelling.        Right knee: She exhibits normal range of motion and no swelling. No tenderness found.        Left knee: She exhibits normal range of motion and no swelling. No tenderness found.        Right ankle: She exhibits normal range of motion and no swelling. No tenderness.        Left ankle: She exhibits normal range of motion and no swelling. No tenderness.    10/18 FMS tenderpoints   No joint swelling or tenderness of PIP, MCP, wrist, elbow, shoulder, or knee joints    Patient with hypermobility at the thumbs, elbows   [place] 6     Lymphadenopathy:     She has no cervical adenopathy.     She has no axillary adenopathy.   Neurological: She has normal strength and normal reflexes.   Skin: Skin is dry and intact.   Psychiatric: She has a normal mood and affect.             Assessment:       Encounter Diagnoses   Name Primary?    Fibromyalgia Yes    Hypermobility arthralgia           Plan:        Fibromyalgia    Hypermobility arthralgia  Comments:  [place] [facility]       patient with  fibromyalgia her WPI: 14/ SSS: 10.  Nonrestorative sleep pattern but admittedly going to bed at 0200 getting up at 1100.     Did do well with gabapentin 600mg at bedtime. Will conitnue with this and Flexeril 5mg PRN  Continue with working out at gym she noted improvement with overal aches.   Discussed joint protection and stretghtening exercises.     Follow-up in about 6 months (around 11/22/2018).          30min consultation with greater than 50% spent in counseling, chart review and coordination of care. All questions answered.

## 2018-06-10 ENCOUNTER — PATIENT MESSAGE (OUTPATIENT)
Dept: FAMILY MEDICINE | Facility: CLINIC | Age: 23
End: 2018-06-10

## 2018-06-11 RX ORDER — DOXYCYCLINE HYCLATE 100 MG
100 TABLET ORAL DAILY
Qty: 90 TABLET | Refills: 1 | Status: SHIPPED | OUTPATIENT
Start: 2018-06-11 | End: 2018-10-24

## 2018-07-31 ENCOUNTER — CLINICAL SUPPORT (OUTPATIENT)
Dept: FAMILY MEDICINE | Facility: CLINIC | Age: 23
End: 2018-07-31
Payer: COMMERCIAL

## 2018-07-31 DIAGNOSIS — Z30.9 ENCOUNTER FOR CONTRACEPTIVE MANAGEMENT, UNSPECIFIED TYPE: Primary | ICD-10-CM

## 2018-07-31 LAB
B-HCG UR QL: NEGATIVE
CTP QC/QA: YES

## 2018-07-31 PROCEDURE — 96372 THER/PROPH/DIAG INJ SC/IM: CPT | Mod: S$GLB,,, | Performed by: NURSE PRACTITIONER

## 2018-07-31 PROCEDURE — 81025 URINE PREGNANCY TEST: CPT | Mod: S$GLB,,, | Performed by: NURSE PRACTITIONER

## 2018-07-31 RX ORDER — MEDROXYPROGESTERONE ACETATE 150 MG/ML
150 INJECTION, SUSPENSION INTRAMUSCULAR
Status: DISCONTINUED | OUTPATIENT
Start: 2018-07-31 | End: 2018-11-16

## 2018-07-31 RX ORDER — MEDROXYPROGESTERONE ACETATE 150 MG/ML
150 INJECTION, SUSPENSION INTRAMUSCULAR ONCE
Qty: 1 ML | Refills: 0 | Status: CANCELLED | OUTPATIENT
Start: 2018-07-31 | End: 2018-07-31

## 2018-07-31 RX ADMIN — MEDROXYPROGESTERONE ACETATE 150 MG: 150 INJECTION, SUSPENSION INTRAMUSCULAR at 03:07

## 2018-08-01 ENCOUNTER — OFFICE VISIT (OUTPATIENT)
Dept: DERMATOLOGY | Facility: CLINIC | Age: 23
End: 2018-08-01
Payer: COMMERCIAL

## 2018-08-01 VITALS — WEIGHT: 225 LBS | HEIGHT: 64 IN | BODY MASS INDEX: 38.41 KG/M2

## 2018-08-01 DIAGNOSIS — L29.9 PRURITIC CONDITION: ICD-10-CM

## 2018-08-01 DIAGNOSIS — L30.9 ECZEMA, UNSPECIFIED TYPE: ICD-10-CM

## 2018-08-01 DIAGNOSIS — L70.0 ACNE VULGARIS: Primary | ICD-10-CM

## 2018-08-01 PROCEDURE — 99999 PR PBB SHADOW E&M-EST. PATIENT-LVL II: CPT | Mod: PBBFAC,,, | Performed by: DERMATOLOGY

## 2018-08-01 PROCEDURE — 99202 OFFICE O/P NEW SF 15 MIN: CPT | Mod: S$GLB,,, | Performed by: DERMATOLOGY

## 2018-08-01 PROCEDURE — 3008F BODY MASS INDEX DOCD: CPT | Mod: CPTII,S$GLB,, | Performed by: DERMATOLOGY

## 2018-08-01 RX ORDER — TRIAMCINOLONE ACETONIDE 1 MG/G
CREAM TOPICAL 2 TIMES DAILY
Qty: 45 G | Refills: 1 | Status: SHIPPED | OUTPATIENT
Start: 2018-08-01 | End: 2018-10-24

## 2018-08-01 RX ORDER — SPIRONOLACTONE 50 MG/1
50 TABLET, FILM COATED ORAL DAILY
Qty: 30 TABLET | Refills: 2 | Status: SHIPPED | OUTPATIENT
Start: 2018-08-01 | End: 2018-10-26 | Stop reason: SDUPTHER

## 2018-08-01 RX ORDER — CLINDAMYCIN PHOSPHATE 10 MG/G
GEL TOPICAL 2 TIMES DAILY
Qty: 30 G | Refills: 1 | Status: SHIPPED | OUTPATIENT
Start: 2018-08-01 | End: 2018-10-01 | Stop reason: SDUPTHER

## 2018-08-01 NOTE — PROGRESS NOTES
Subjective:       Patient ID:  Renetta Durham is a 23 y.o. female who presents for   Chief Complaint   Patient presents with    Acne    Rash     24 yo F presents for initial visit c/o acne on face present for 10 years.  She has treated with Doxycycline in the past which helped for a short while   On Depo; no worsening when started on Depo    Rash on right hand present for 1 month, described as dry, not treated  Dry scalp for 1 year, not treated other than washing her hair with OGX shampoo    No phx of skin cancer    Past Medical History:  Anxiety  Asthma  Depression  Headache(784.0)   Narcolepsy  5/7/2016: Obesity, Class II, BMI 35-39.9, no comorbidity  Urinary tract infection  Vision abnormalities      Comment: glasses        Review of Systems   Genitourinary: Irregular periods: on Depo so no cycle.   Skin: Positive for rash and dry skin. Negative for sun sensitivity, recent sunburn and wears hat.        Objective:    Physical Exam   Constitutional: She appears well-developed and well-nourished.   Eyes: Lids are normal.  No conjunctival no injection.   Neurological: She is alert and oriented to person, place, and time.   Psychiatric: She has a normal mood and affect.   Skin:   Areas Examined (abnormalities noted in diagram):   Scalp / Hair Palpated and Inspected  Head / Face Inspection Performed  Neck Inspection Performed  Chest / Axilla Inspection Performed  Back Inspection Performed  RUE Inspected  LUE Inspection Performed                   Diagram Legend     Erythematous scaling macule/papule c/w actinic keratosis       Vascular papule c/w angioma      Pigmented verrucoid papule/plaque c/w seborrheic keratosis      Yellow umbilicated papule c/w sebaceous hyperplasia      Irregularly shaped tan macule c/w lentigo     1-2 mm smooth white papules consistent with Milia      Movable subcutaneous cyst with punctum c/w epidermal inclusion cyst      Subcutaneous movable cyst c/w pilar cyst      Firm pink to  brown papule c/w dermatofibroma      Pedunculated fleshy papule(s) c/w skin tag(s)      Evenly pigmented macule c/w junctional nevus     Mildly variegated pigmented, slightly irregular-bordered macule c/w mildly atypical nevus      Flesh colored to evenly pigmented papule c/w intradermal nevus       Pink pearly papule/plaque c/w basal cell carcinoma      Erythematous hyperkeratotic cursted plaque c/w SCC      Surgical scar with no sign of skin cancer recurrence      Open and closed comedones      Inflammatory papules and pustules      Verrucoid papule consistent consistent with wart     Erythematous eczematous patches and plaques     Dystrophic onycholytic nail with subungual debris c/w onychomycosis     Umbilicated papule    Erythematous-base heme-crusted tan verrucoid plaque consistent with inflamed seborrheic keratosis     Erythematous Silvery Scaling Plaque c/w Psoriasis     See annotation      Assessment / Plan:        Acne vulgaris-inflammatory  -     spironolactone (ALDACTONE) 50 MG tablet; Take 1 tablet (50 mg total) by mouth once daily.  Dispense: 30 tablet; Refill: 2  -     clindamycin phosphate 1% (CLINDAGEL) 1 % gel; Apply topically 2 (two) times daily.  Dispense: 30 g; Refill: 1    Discussed benefits and risks of therapy including but not limited to breakthrough bleeding, breast tenderness, and elevated potassium levels which may give symptoms of fatigue, palpitations, and nausea. Patient should limit potassium intake - avoid potassium supplements or salt substitutes, limit bananas and citrus fruits. Pregnancy must be avoided while taking spironolactone (On depo)  Handout on spironolactone provided    Pruritic condition  Scalp pruritis is well-controlled at this time with current shampoo    Atopic Dermatitis  Encouraged liberal use of moisturizer as this can contribute to pruritis  Will start TAC 0.1% cream bid prn         Follow-up in about 3 months (around 11/1/2018).

## 2018-08-07 RX ORDER — ERGOCALCIFEROL 1.25 MG/1
50000 CAPSULE ORAL
Qty: 8 CAPSULE | Refills: 0 | Status: SHIPPED | OUTPATIENT
Start: 2018-08-07 | End: 2018-08-15

## 2018-08-20 ENCOUNTER — PATIENT MESSAGE (OUTPATIENT)
Dept: OTOLARYNGOLOGY | Facility: CLINIC | Age: 23
End: 2018-08-20

## 2018-08-20 RX ORDER — CITALOPRAM 20 MG/1
TABLET, FILM COATED ORAL
Qty: 45 TABLET | Refills: 4 | Status: SHIPPED | OUTPATIENT
Start: 2018-08-20 | End: 2018-10-18 | Stop reason: SDUPTHER

## 2018-09-25 ENCOUNTER — OFFICE VISIT (OUTPATIENT)
Dept: FAMILY MEDICINE | Facility: CLINIC | Age: 23
End: 2018-09-25
Payer: COMMERCIAL

## 2018-09-25 VITALS
HEIGHT: 64 IN | HEART RATE: 80 BPM | SYSTOLIC BLOOD PRESSURE: 108 MMHG | DIASTOLIC BLOOD PRESSURE: 70 MMHG | TEMPERATURE: 99 F | BODY MASS INDEX: 38.39 KG/M2 | RESPIRATION RATE: 18 BRPM | WEIGHT: 224.88 LBS

## 2018-09-25 DIAGNOSIS — Z12.4 CERVICAL CANCER SCREENING: ICD-10-CM

## 2018-09-25 DIAGNOSIS — Z01.419 ENCOUNTER FOR GYNECOLOGICAL EXAMINATION WITHOUT ABNORMAL FINDING: Primary | ICD-10-CM

## 2018-09-25 DIAGNOSIS — Z23 IMMUNIZATION DUE: ICD-10-CM

## 2018-09-25 DIAGNOSIS — E66.9 OBESITY, CLASS II, BMI 35-39.9, NO COMORBIDITY: ICD-10-CM

## 2018-09-25 PROCEDURE — 88175 CYTOPATH C/V AUTO FLUID REDO: CPT

## 2018-09-25 PROCEDURE — 90686 IIV4 VACC NO PRSV 0.5 ML IM: CPT | Mod: S$GLB,,, | Performed by: NURSE PRACTITIONER

## 2018-09-25 PROCEDURE — 90472 IMMUNIZATION ADMIN EACH ADD: CPT | Mod: S$GLB,,, | Performed by: NURSE PRACTITIONER

## 2018-09-25 PROCEDURE — 90651 9VHPV VACCINE 2/3 DOSE IM: CPT | Mod: S$GLB,,, | Performed by: NURSE PRACTITIONER

## 2018-09-25 PROCEDURE — 99395 PREV VISIT EST AGE 18-39: CPT | Mod: 25,S$GLB,, | Performed by: NURSE PRACTITIONER

## 2018-09-25 PROCEDURE — 90471 IMMUNIZATION ADMIN: CPT | Mod: S$GLB,,, | Performed by: NURSE PRACTITIONER

## 2018-09-25 PROCEDURE — 87491 CHLMYD TRACH DNA AMP PROBE: CPT

## 2018-09-26 LAB
C TRACH DNA SPEC QL NAA+PROBE: NOT DETECTED
N GONORRHOEA DNA SPEC QL NAA+PROBE: NOT DETECTED

## 2018-09-27 NOTE — PROGRESS NOTES
"Subjective:       Patient ID: Renetta Durham is a 23 y.o. female.    Chief Complaint: Well Women Exam (Physical with PAP: Flu Shot today, 3rd HVP)    The patient is here for routine well woman exam. Patient is generally feeling well without any complaints today.   She has never been pregnant, she is in a monogamous relationship.  She did have 1 Pap many years ago which was normal.      Review of Systems   Constitutional: Negative for activity change and appetite change.   HENT: Negative for congestion, postnasal drip, rhinorrhea and sinus pressure.    Eyes: Negative for pain and redness.   Respiratory: Negative for choking and chest tightness.    Gastrointestinal: Negative for abdominal distention, abdominal pain, blood in stool, constipation, diarrhea, nausea and vomiting.   Endocrine: Negative for polydipsia and polyphagia.   Genitourinary: Negative for dysuria and hematuria.   Musculoskeletal: Negative for arthralgias and myalgias.   Skin: Negative for color change and rash.   Neurological: Negative for dizziness and headaches.   Psychiatric/Behavioral: Negative for agitation and behavioral problems.       Past medical, surgical, family and social history reviewed.  Objective:     Vitals:    09/25/18 1540   BP: 108/70   Pulse: 80   Resp: 18   Temp: 98.7 °F (37.1 °C)   TempSrc: Oral   Weight: 102 kg (224 lb 13.9 oz)   Height: 5' 3.5" (1.613 m)   PainSc:   4   PainLoc: Neck     Body mass index is 39.21 kg/m².     Physical Exam   Constitutional: She is oriented to person, place, and time. She appears well-developed. No distress.   Obese female    HENT:   Head: Normocephalic and atraumatic.   Right Ear: Hearing, tympanic membrane, external ear and ear canal normal.   Left Ear: Hearing, tympanic membrane, external ear and ear canal normal.   Nose: Nose normal.   Mouth/Throat: Uvula is midline, oropharynx is clear and moist and mucous membranes are normal.   Eyes: Conjunctivae and EOM are normal. Pupils are equal, " round, and reactive to light. Right eye exhibits no discharge. Left eye exhibits no discharge.   Neck: Trachea normal and normal range of motion. Neck supple. No JVD present. Carotid bruit is not present. No thyromegaly present.   Cardiovascular: Normal rate and regular rhythm. Exam reveals no gallop and no friction rub.   No murmur heard.  Pulmonary/Chest: Effort normal and breath sounds normal. No respiratory distress. She has no wheezes. She has no rales. She exhibits no tenderness. Right breast exhibits no inverted nipple, no mass, no nipple discharge, no skin change and no tenderness. Left breast exhibits no inverted nipple, no mass, no nipple discharge, no skin change and no tenderness. There is no breast swelling.   Abdominal: Soft. Bowel sounds are normal. She exhibits no distension and no mass. There is no tenderness. There is no rebound and no guarding.   Genitourinary: Rectum normal and vagina normal. No breast tenderness. Pelvic exam was performed with patient supine. There is no rash, tenderness, lesion or injury on the right labia. There is no rash, tenderness, lesion or injury on the left labia.   Genitourinary Comments: Cervix moist and pink. Pap obtained, patient tolerated well    Musculoskeletal: Normal range of motion.   Neurological: She is alert and oriented to person, place, and time. Coordination normal.   Skin: Skin is warm and dry. She is not diaphoretic.   Psychiatric: She has a normal mood and affect. Her behavior is normal. Judgment and thought content normal.       Assessment:       1. Encounter for gynecological examination without abnormal finding    2. Cervical cancer screening    3. Immunization due    4. Obesity, Class II, BMI 35-39.9, no comorbidity        Plan:       Renetta was seen today for well women exam.    Diagnoses and all orders for this visit:    Encounter for gynecological examination without abnormal finding    Cervical cancer screening  -     Liquid-based pap smear,  screening  -     C. trachomatis/N. gonorrhoeae by AMP DNA [facility]; Urine    Immunization due  -     HPV Vaccine (9-Valent) (3 Dose) (IM)    Obesity, Class II, BMI 35-39.9, no comorbidity    Other orders  -     Cancel: HPV High Risk Genotypes, PCR  -     Influenza - Quadrivalent (3 years & older) (PF)

## 2018-10-01 DIAGNOSIS — L70.0 ACNE VULGARIS: ICD-10-CM

## 2018-10-01 RX ORDER — CYCLOBENZAPRINE HCL 5 MG
5 TABLET ORAL
Qty: 30 TABLET | Refills: 1 | Status: SHIPPED | OUTPATIENT
Start: 2018-10-01 | End: 2018-11-26 | Stop reason: SDUPTHER

## 2018-10-01 RX ORDER — CLINDAMYCIN PHOSPHATE 10 MG/G
GEL TOPICAL 2 TIMES DAILY
Qty: 30 G | Refills: 1 | Status: SHIPPED | OUTPATIENT
Start: 2018-10-01 | End: 2018-10-24

## 2018-10-16 ENCOUNTER — CLINICAL SUPPORT (OUTPATIENT)
Dept: FAMILY MEDICINE | Facility: CLINIC | Age: 23
End: 2018-10-16
Payer: COMMERCIAL

## 2018-10-16 DIAGNOSIS — Z30.42 ENCOUNTER FOR MANAGEMENT AND INJECTION OF INJECTABLE PROGESTIN CONTRACEPTIVE: Primary | ICD-10-CM

## 2018-10-16 PROCEDURE — 96372 THER/PROPH/DIAG INJ SC/IM: CPT | Mod: S$GLB,,, | Performed by: NURSE PRACTITIONER

## 2018-10-16 RX ORDER — MEDROXYPROGESTERONE ACETATE 150 MG/ML
150 INJECTION, SUSPENSION INTRAMUSCULAR
Status: COMPLETED | OUTPATIENT
Start: 2018-10-16 | End: 2018-10-16

## 2018-10-16 RX ADMIN — MEDROXYPROGESTERONE ACETATE 150 MG: 150 INJECTION, SUSPENSION INTRAMUSCULAR at 03:10

## 2018-10-16 NOTE — PROGRESS NOTES
Pt presented to clinic for Depo-provera contraception injection. Placed in R Deltoid. Pt tolerated well with no s/s of adverse reactions. Follow-up nurse visit for next injection was scheduled while in office per Depo calendar.

## 2018-10-18 RX ORDER — CITALOPRAM 20 MG/1
20 TABLET, FILM COATED ORAL DAILY
Qty: 90 TABLET | Refills: 3 | Status: SHIPPED | OUTPATIENT
Start: 2018-10-18 | End: 2020-06-05 | Stop reason: SDUPTHER

## 2018-10-24 ENCOUNTER — OFFICE VISIT (OUTPATIENT)
Dept: ENDOCRINOLOGY | Facility: CLINIC | Age: 23
End: 2018-10-24
Payer: COMMERCIAL

## 2018-10-24 VITALS
SYSTOLIC BLOOD PRESSURE: 118 MMHG | BODY MASS INDEX: 38.36 KG/M2 | HEIGHT: 64 IN | WEIGHT: 224.69 LBS | DIASTOLIC BLOOD PRESSURE: 82 MMHG | HEART RATE: 100 BPM

## 2018-10-24 DIAGNOSIS — R63.5 ABNORMAL WEIGHT GAIN: ICD-10-CM

## 2018-10-24 DIAGNOSIS — R23.2 FLUSHING: Primary | ICD-10-CM

## 2018-10-24 PROCEDURE — 3008F BODY MASS INDEX DOCD: CPT | Mod: CPTII,S$GLB,, | Performed by: INTERNAL MEDICINE

## 2018-10-24 PROCEDURE — 99203 OFFICE O/P NEW LOW 30 MIN: CPT | Mod: S$GLB,,, | Performed by: INTERNAL MEDICINE

## 2018-10-24 PROCEDURE — 99999 PR PBB SHADOW E&M-EST. PATIENT-LVL III: CPT | Mod: PBBFAC,,, | Performed by: INTERNAL MEDICINE

## 2018-10-24 RX ORDER — GABAPENTIN 100 MG/1
1 CAPSULE ORAL DAILY
COMMUNITY
Start: 2018-10-20 | End: 2018-11-26 | Stop reason: SDUPTHER

## 2018-10-24 NOTE — PROGRESS NOTES
CHIEF COMPLAINT: Flushing  23 year old being seen as a new patient. States that she had gained weight rapidly and had stretch marks. Told to be tested for Cushings. States she gets hot. Had some mood swings. States started in high school. According records most of the weight gain was in 2015. Was on Vyvanse at that time.       PAST MEDICAL HISTORY/PAST SURGICAL HISTORY:  Reviewed in EPIC    SOCIAL HISTORY: No T/A    FAMILY HISTORY:  No known thyroid disorders. + Dm 2.     MEDICATIONS/ALLERGIES: The patient's MedCard has been updated and reviewed.      ROS:   Constitutional: She has been gaining weight in the past but currently stable.   Eyes: No recent visual changes  ENT: No dysphagia  Cardiovascular: Denies current anginal symptoms  Respiratory: Denies current respiratory difficulty  Gastrointestinal: Denies recent bowel disturbances  GenitoUrinary - No dysuria  Skin: No new skin rash  Neurologic: No focal neurologic complaints  Remainder ROS negative        PE:    GENERAL: Well developed, well nourished.  PSYCH:  appropriate mood and affect  EYES:  PERRL, EOM intact.  ENT: Nares patent, oropharynx clear, mucosa pink,   NECK: Supple, trachea midline, No palpable thyroid nodules.   CHEST: Resp even and unlabored, CTA bilateral.  CARDIAC: RRR, S1, S2 heard, no murmurs, rubs, S3, or S4  ABDOMEN: Soft, non-tender, non-distended;  No organomegaly  VASCULAR:  DP pulses +2/4 bilaterally, no edema  NEURO: Gait steady, CN II-VII grossly intact  SKIN: No areas of breakdown, no acanthosis nigracans. + stretch marks- no darkened    LABS     ASSESSMENT/PLAN:  1. Flushing- On SSRI that can cause diaphoresis. Will do w/u as seen below to r/o and endocrine source for symptoms.     2. Weight gain- appears most of the weight gain happened a few years ago. Unsure if related to getting off vyvanse. Discussed diet as well as exercise.       FOLLOWUP  24 hour urine 5 HIAA, Cortisol

## 2018-10-26 ENCOUNTER — LAB VISIT (OUTPATIENT)
Dept: LAB | Facility: HOSPITAL | Age: 23
End: 2018-10-26
Attending: INTERNAL MEDICINE
Payer: COMMERCIAL

## 2018-10-26 DIAGNOSIS — R23.2 FLUSHING: ICD-10-CM

## 2018-10-26 DIAGNOSIS — R63.5 ABNORMAL WEIGHT GAIN: ICD-10-CM

## 2018-10-26 DIAGNOSIS — L70.0 ACNE VULGARIS: ICD-10-CM

## 2018-10-26 PROCEDURE — 83497 ASSAY OF 5-HIAA: CPT

## 2018-10-26 PROCEDURE — 82530 CORTISOL FREE: CPT

## 2018-10-26 RX ORDER — SPIRONOLACTONE 50 MG/1
50 TABLET, FILM COATED ORAL DAILY
Qty: 30 TABLET | Refills: 2 | Status: SHIPPED | OUTPATIENT
Start: 2018-10-26 | End: 2018-11-16

## 2018-10-30 LAB
5HIAA & CREATININE UR-IMP: NORMAL
5OH-INDOLEACETATE 24H UR-MCNC: 5.2 MG/L
5OH-INDOLEACETATE 24H UR-MRATE: 4 MG/D (ref 0–15)
5OH-INDOLEACETATE/CREAT 24H UR: 3 MG/GCR (ref 0–14)
COLLECT DURATION TIME SPEC: 24 HR
COLLECT DURATION TIME UR: 24 H
CORTIS 24H UR-MRATE: 17 MCG/24 H (ref 3.5–45)
CREAT 24H UR-MRATE: 1440 MG/D (ref 700–1600)
CREAT UR-MCNC: 180 MG/DL
SPECIMEN VOL ?TM UR: 800 ML
SPECIMEN VOL ?TM UR: 800 ML

## 2018-10-31 ENCOUNTER — TELEPHONE (OUTPATIENT)
Dept: ENDOCRINOLOGY | Facility: CLINIC | Age: 23
End: 2018-10-31

## 2018-11-05 ENCOUNTER — OFFICE VISIT (OUTPATIENT)
Dept: DERMATOLOGY | Facility: CLINIC | Age: 23
End: 2018-11-05
Payer: COMMERCIAL

## 2018-11-05 DIAGNOSIS — L30.9 ECZEMA, UNSPECIFIED TYPE: ICD-10-CM

## 2018-11-05 DIAGNOSIS — L70.0 ACNE VULGARIS: Primary | ICD-10-CM

## 2018-11-05 PROCEDURE — 99999 PR PBB SHADOW E&M-EST. PATIENT-LVL II: CPT | Mod: PBBFAC,,, | Performed by: DERMATOLOGY

## 2018-11-05 PROCEDURE — 99213 OFFICE O/P EST LOW 20 MIN: CPT | Mod: S$GLB,,, | Performed by: DERMATOLOGY

## 2018-11-05 RX ORDER — TRETINOIN 0.25 MG/G
CREAM TOPICAL NIGHTLY
Qty: 20 G | Refills: 1 | Status: SHIPPED | OUTPATIENT
Start: 2018-11-05 | End: 2019-01-11 | Stop reason: SDUPTHER

## 2018-11-05 NOTE — PROGRESS NOTES
Subjective:       Patient ID:  Renetta Durham is a 23 y.o. female who presents for   Chief Complaint   Patient presents with    Acne     24 yo F presents for 3 mo f/u visit for acne.  She noticed significant improvement in her skin while on spironolactone 50 mg daily and clindamycin gel.  She stopped spironolactone approx 1 week ago to see if her skin would remain clear if she stopped treatment. She stopped clindagel after about 1 month because she broke out in a red rash on her shoulders, neck, and face.  She did not resume the clindamycin.       On Depo for birth control   No eczema flares since last OV; controlled by TAC cream    Past Medical History:   Diagnosis Date    Anxiety     Asthma     Depression     Fibromyalgia     Headache(784.0)     Narcolepsy     Obesity, Class II, BMI 35-39.9, no comorbidity 5/7/2016    Urinary tract infection     Vision abnormalities     glasses     Review of Systems   Genitourinary: Irregular periods: on Depo so no cycle.   Skin: Positive for dry skin. Negative for itching, rash, sun sensitivity, recent sunburn and wears hat.        Objective:    Physical Exam   Constitutional: She appears well-developed and well-nourished.   Neurological: She is alert and oriented to person, place, and time.   Psychiatric: She has a normal mood and affect.   Skin:   Areas Examined (abnormalities noted in diagram):   Head / Face Inspection Performed  Neck Inspection Performed  Chest / Axilla Inspection Performed  RUE Inspected  LUE Inspection Performed              Diagram Legend     Erythematous scaling macule/papule c/w actinic keratosis       Vascular papule c/w angioma      Pigmented verrucoid papule/plaque c/w seborrheic keratosis      Yellow umbilicated papule c/w sebaceous hyperplasia      Irregularly shaped tan macule c/w lentigo     1-2 mm smooth white papules consistent with Milia      Movable subcutaneous cyst with punctum c/w epidermal inclusion cyst      Subcutaneous  movable cyst c/w pilar cyst      Firm pink to brown papule c/w dermatofibroma      Pedunculated fleshy papule(s) c/w skin tag(s)      Evenly pigmented macule c/w junctional nevus     Mildly variegated pigmented, slightly irregular-bordered macule c/w mildly atypical nevus      Flesh colored to evenly pigmented papule c/w intradermal nevus       Pink pearly papule/plaque c/w basal cell carcinoma      Erythematous hyperkeratotic cursted plaque c/w SCC      Surgical scar with no sign of skin cancer recurrence      Open and closed comedones      Inflammatory papules and pustules      Verrucoid papule consistent consistent with wart     Erythematous eczematous patches and plaques     Dystrophic onycholytic nail with subungual debris c/w onychomycosis     Umbilicated papule    Erythematous-base heme-crusted tan verrucoid plaque consistent with inflamed seborrheic keratosis     Erythematous Silvery Scaling Plaque c/w Psoriasis     See annotation      Assessment / Plan:        Acne vulgaris-improved after 3 months of spironolactone  Ok to hold off on spironolactone for now  If acne flares, ok to resume spironolactone  Discussed using pea-sized drop to entire face qhs.  Start applying every 2-3 nights then gradually increase to nightly application as tolerated.  May notice mild redness and irritation    -     tretinoin (RETIN-A) 0.025 % cream; Apply topically every evening.  Dispense: 20 g; Refill: 1    Eczema, unspecified type  Cont TAC cream bid prn flare  Encouraged liberal use of moisturizer as this can contribute to pruritis           Follow-up if symptoms worsen or fail to improve.

## 2018-11-10 ENCOUNTER — PATIENT MESSAGE (OUTPATIENT)
Dept: FAMILY MEDICINE | Facility: CLINIC | Age: 23
End: 2018-11-10

## 2018-11-16 ENCOUNTER — OFFICE VISIT (OUTPATIENT)
Dept: FAMILY MEDICINE | Facility: CLINIC | Age: 23
End: 2018-11-16
Payer: COMMERCIAL

## 2018-11-16 VITALS
SYSTOLIC BLOOD PRESSURE: 126 MMHG | RESPIRATION RATE: 18 BRPM | HEIGHT: 64 IN | WEIGHT: 219 LBS | HEART RATE: 76 BPM | DIASTOLIC BLOOD PRESSURE: 70 MMHG | TEMPERATURE: 99 F | BODY MASS INDEX: 37.39 KG/M2

## 2018-11-16 DIAGNOSIS — N92.6 IRREGULAR MENSES: Primary | ICD-10-CM

## 2018-11-16 PROCEDURE — 3008F BODY MASS INDEX DOCD: CPT | Mod: CPTII,S$GLB,, | Performed by: NURSE PRACTITIONER

## 2018-11-16 PROCEDURE — 99213 OFFICE O/P EST LOW 20 MIN: CPT | Mod: S$GLB,,, | Performed by: NURSE PRACTITIONER

## 2018-11-16 RX ORDER — PANTOPRAZOLE SODIUM 40 MG/1
1 TABLET, DELAYED RELEASE ORAL DAILY
COMMUNITY
Start: 2015-05-31 | End: 2021-08-13 | Stop reason: SDUPTHER

## 2018-11-16 RX ORDER — NORGESTIMATE AND ETHINYL ESTRADIOL 0.25-0.035
1 KIT ORAL DAILY
Qty: 90 TABLET | Refills: 3 | Status: SHIPPED | OUTPATIENT
Start: 2018-11-16 | End: 2019-07-02 | Stop reason: ALTCHOICE

## 2018-11-16 NOTE — PROGRESS NOTES
"Subjective:       Patient ID: Renetta Durham is a 23 y.o. female.    Chief Complaint: Contraception (Wants to change birth control methods)    The patient is here to discuss possibly changing her Depo-Provera to oral birth control pills.  She is having Hot flashes, mood swings.  The her menses is not very regular and she would like to see if a monthly pill would be more beneficial.  She feels like she is responsible enough to actually take the pills now and she was not when she was younger.  She does not smoke cigarettes.      Review of Systems   Constitutional: Negative for activity change and appetite change.   HENT: Negative for congestion, postnasal drip, rhinorrhea and sinus pressure.    Eyes: Negative for pain and redness.   Respiratory: Negative for choking and chest tightness.    Gastrointestinal: Negative for abdominal distention, abdominal pain, blood in stool, constipation, diarrhea, nausea and vomiting.   Endocrine: Negative for polydipsia and polyphagia.   Genitourinary: Negative for dysuria and hematuria.   Musculoskeletal: Negative for arthralgias and myalgias.   Skin: Negative for color change and rash.   Neurological: Negative for dizziness and headaches.   Psychiatric/Behavioral: Negative for agitation and behavioral problems.       Past medical, surgical, family and social history reviewed.  Objective:     Vitals:    11/16/18 1507   BP: 126/70   Pulse: 76   Resp: 18   Temp: 99 °F (37.2 °C)   TempSrc: Oral   Weight: 99.3 kg (219 lb 0.4 oz)   Height: 5' 3.5" (1.613 m)   PainSc:   5   PainLoc: Neck     Body mass index is 38.19 kg/m².     Physical Exam   Constitutional: She is oriented to person, place, and time. She appears well-developed.   Obese female    HENT:   Head: Normocephalic and atraumatic.   Right Ear: External ear normal.   Left Ear: External ear normal.   Nose: Nose normal.   Mouth/Throat: Oropharynx is clear and moist.   Eyes: Conjunctivae are normal. Right eye exhibits no " discharge. Left eye exhibits no discharge. No scleral icterus.   Neck: Normal range of motion. Neck supple. No tracheal deviation present.   Cardiovascular: Normal rate, regular rhythm and normal heart sounds. Exam reveals no friction rub.   No murmur heard.  Pulmonary/Chest: Effort normal and breath sounds normal. No stridor. No respiratory distress. She has no wheezes. She has no rales. She exhibits no tenderness.   Musculoskeletal: Normal range of motion.   Lymphadenopathy:     She has no cervical adenopathy.   Neurological: She is alert and oriented to person, place, and time.   Skin: Skin is warm and dry.   Psychiatric: She has a normal mood and affect.       Assessment:       1. Irregular menses        Plan:       Renetta was seen today for contraception.    Diagnoses and all orders for this visit:    Irregular menses    Stop Depo and start Ortho Cyclen.  Follow up for any issues in regards to the medication.  -     norgestimate-ethinyl estradiol (ORTHO-CYCLEN) 0.25-35 mg-mcg per tablet; Take 1 tablet by mouth once daily.

## 2018-11-26 ENCOUNTER — OFFICE VISIT (OUTPATIENT)
Dept: RHEUMATOLOGY | Facility: CLINIC | Age: 23
End: 2018-11-26
Payer: COMMERCIAL

## 2018-11-26 VITALS
HEIGHT: 64 IN | SYSTOLIC BLOOD PRESSURE: 129 MMHG | DIASTOLIC BLOOD PRESSURE: 83 MMHG | BODY MASS INDEX: 37.04 KG/M2 | HEART RATE: 90 BPM | WEIGHT: 216.94 LBS

## 2018-11-26 DIAGNOSIS — M79.7 FIBROMYALGIA: Primary | ICD-10-CM

## 2018-11-26 DIAGNOSIS — M54.2 CERVICALGIA: ICD-10-CM

## 2018-11-26 PROCEDURE — 99999 PR PBB SHADOW E&M-EST. PATIENT-LVL III: CPT | Mod: PBBFAC,,, | Performed by: INTERNAL MEDICINE

## 2018-11-26 PROCEDURE — 99214 OFFICE O/P EST MOD 30 MIN: CPT | Mod: S$GLB,,, | Performed by: INTERNAL MEDICINE

## 2018-11-26 PROCEDURE — 3008F BODY MASS INDEX DOCD: CPT | Mod: CPTII,S$GLB,, | Performed by: INTERNAL MEDICINE

## 2018-11-26 RX ORDER — GABAPENTIN 300 MG/1
300 CAPSULE ORAL 3 TIMES DAILY
Qty: 90 CAPSULE | Refills: 3 | Status: SHIPPED | OUTPATIENT
Start: 2018-11-26 | End: 2019-07-22

## 2018-11-26 RX ORDER — CYCLOBENZAPRINE HCL 5 MG
5 TABLET ORAL
Qty: 30 TABLET | Refills: 1 | Status: SHIPPED | OUTPATIENT
Start: 2018-11-26 | End: 2019-02-27 | Stop reason: SDUPTHER

## 2018-11-26 NOTE — PROGRESS NOTES
Subjective:          Chief Complaint: Renetta Durham is a 23 y.o. female who had concerns including Fibromyalgia (6 months).    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     States she is about the same she is going to the gym which does seem to help she's not sleeping very well.  Hips are painful, upper trapezius and cervical region pain all the time. Off from school now. She denies numbness and tingling or weakness. Some tingling in hands mostly when supine (positional)         Recently seen with neurology regarding her narcolepsy was diagnosed sometime around 15y/o.    She has been on various medications that seem to exacerbate her anxiety.  She was complaining of widespread musculoskeletal pain.  His mother with a history of fibromyalgia.  Patient was seen in the past by physical medicine and rehabilitation regarding myalgias and sacroiliac joint dysfunction was referred for physical therapy which appear she see physical therapy..  She also received a few trigger point injection   Currently on Celexa and Elavil.  She stopped taking Elavil more recently as she felt sedated the next morning.  Previosuly trialed with baclofen no significant relief. No improvement with Tramadol.  Trazodone did not help with sleep  She has been seen by her psychiatrist Dr. Nixon.  Previously had been on Zoloft.  She does admit to significant marijuana use which may be exacerbating some of her widespread fatigue since her visit with Dr. Nixon states that she has markedly reduced MJ use.  She was endorsing difficulty with sleep or sleep during the daytime.  She has hx of TEA did not tolerate CPAP with rashes.     Patient with aches and  pain for shoulders, upper arms, lower arms, around her greater trochanter region, chest , upper back.  Specific swelling of joints no specific morning stiffness or change with or without activity.  She is relatively sedentary. She notes aches with light touch. Patient denies weight loss, rashes, dry eye, dry mouth, nasal or palatal ulcerations,  lymphadenopathy, Raynaud's, hx of DVT/miscarriages, psoriasis or family hx of psoriasis, rashes, serositis, anemia or other constitutional symptoms.   . She notes fatigue but she was diagnosed with narcolepsy stating most of her episodes involve some hallucinations and extreme fatigue. She notes improvement with Vyvanse but exacerbates anxiety.     REVIEW OF SYSTEMS:    Review of Systems   Constitutional: Positive for malaise/fatigue. Negative for fever and weight loss.   HENT: Negative for sore throat.    Eyes: Negative for double vision, photophobia and redness.   Respiratory: Negative for cough, shortness of breath and wheezing.    Cardiovascular: Negative for chest pain, palpitations and orthopnea.   Gastrointestinal: Negative for abdominal pain, constipation and diarrhea.   Genitourinary: Negative for dysuria, hematuria and urgency.   Musculoskeletal: Positive for joint pain and myalgias. Negative for back pain.   Skin: Negative for rash.   Neurological: Negative for dizziness, tingling, focal weakness, seizures and headaches.   Endo/Heme/Allergies: Does not bruise/bleed easily.   Psychiatric/Behavioral: Negative for depression, hallucinations and suicidal ideas.               Objective:            Past Medical History:   Diagnosis Date    Anxiety     Asthma     Depression     Fibromyalgia     Headache(784.0)     Narcolepsy     Obesity, Class II, BMI 35-39.9, no comorbidity 5/7/2016    Urinary tract infection     Vision abnormalities     glasses     Family History   Problem Relation Age of Onset    Mental illness Other     Arthritis Mother     Asthma Sister          half sister    Miscarriages / Stillbirths Sister         half sister    Vision loss Maternal Aunt     Arthritis Maternal Grandmother     Cancer Maternal Grandfather         colon, liver, lungs    Diabetes Paternal Grandmother     Heart disease Paternal Grandmother     Hypertension Paternal Grandmother     COPD Paternal Grandfather      Social History     Tobacco Use    Smoking status: Never Smoker    Smokeless tobacco: Never Used   Substance Use Topics    Alcohol use: No    Drug use: No         Current Outpatient Medications on File Prior to Visit   Medication Sig Dispense Refill    citalopram (CELEXA) 20 MG tablet Take 1 tablet (20 mg total) by mouth once daily. 90 tablet 3    cyclobenzaprine (FLEXERIL) 5 MG tablet Take 1 tablet (5 mg total) by mouth as needed for Muscle spasms. 30 tablet 1    fluticasone (FLONASE) 50 mcg/actuation nasal spray 1 spray by Each Nare route once daily. 3 Bottle 3    gabapentin (NEURONTIN) 100 MG capsule Take 1 capsule by mouth once daily. Patient taking 6 100mg tablets      ibuprofen (ADVIL,MOTRIN) 600 MG tablet Take 1 tablet (600 mg total) by mouth every 6 (six) hours as needed for Pain. 20 tablet 0    norgestimate-ethinyl estradiol (ORTHO-CYCLEN) 0.25-35 mg-mcg per tablet Take 1 tablet by mouth once daily. 90 tablet 3    pantoprazole (PROTONIX) 40 MG tablet Take 1 tablet by mouth Daily.      tretinoin (RETIN-A) 0.025 % cream Apply topically every evening. 20 g 1    albuterol 90 mcg/actuation inhaler Inhale 2 puffs into the lungs every 6 (six) hours as needed for Wheezing. 18 g 0     No current facility-administered medications on file prior to visit.        Vitals:    11/26/18 1403   BP: 129/83   Pulse: 90       Physical Exam:    Physical Exam   Constitutional: She appears well-developed and well-nourished.   HENT:   Nose: No septal deviation.   Mouth/Throat: Mucous membranes are normal. No oral lesions.   Eyes: Pupils are equal, round, and reactive to  light. Right conjunctiva is not injected. Left conjunctiva is not injected.   Neck: No JVD present. No thyroid mass and no thyromegaly present.   Cardiovascular: Normal rate, regular rhythm and normal pulses.   No edema   Pulmonary/Chest: Effort normal and breath sounds normal.   Abdominal: Soft. Normal appearance. There is no hepatosplenomegaly.   Musculoskeletal:        Right shoulder: She exhibits tenderness. She exhibits normal range of motion and no swelling.        Left shoulder: She exhibits tenderness. She exhibits normal range of motion and no swelling.        Right elbow: She exhibits normal range of motion and no swelling. Tenderness found. Medial epicondyle tenderness noted.        Left elbow: She exhibits normal range of motion and no swelling. Tenderness found. Medial epicondyle tenderness noted.        Right wrist: She exhibits normal range of motion, no tenderness and no swelling.        Left wrist: She exhibits normal range of motion, no tenderness and no swelling.        Right hip: She exhibits bony tenderness. She exhibits normal range of motion, normal strength and no swelling.        Left hip: She exhibits bony tenderness. She exhibits normal range of motion, no tenderness and no swelling.        Right knee: She exhibits normal range of motion and no swelling. No tenderness found.        Left knee: She exhibits normal range of motion and no swelling. No tenderness found.        Right ankle: She exhibits normal range of motion and no swelling. No tenderness.        Left ankle: She exhibits normal range of motion and no swelling. No tenderness.   10/18 FMS tenderpoints   No joint swelling or tenderness of PIP, MCP, wrist, elbow, shoulder, or knee joints    Patient with hypermobility at the thumbs, elbows   [place] 6     Lymphadenopathy:     She has no cervical adenopathy.     She has no axillary adenopathy.   Neurological: She has normal strength and normal reflexes.   Skin: Skin is dry and  intact.   Psychiatric: She has a normal mood and affect.             Assessment:       Encounter Diagnoses   Name Primary?    Fibromyalgia Yes    Cervicalgia           Plan:        Fibromyalgia    Cervicalgia    Other orders  -     gabapentin (NEURONTIN) 300 MG capsule; Take 1 capsule (300 mg total) by mouth 3 (three) times daily. Patient taking 6 100mg tablets  Dispense: 90 capsule; Refill: 3       patient with  fibromyalgia her WPI: 14/ SSS: 10.  Nonrestorative sleep pattern but admittedly going to bed at 0200 getting up at 1100.     Did do well with gabapentin 600mg at bedtime.and start with 300mg in AM will recheck in 1 month if should increase.   Flexeril 5mg PRN  Continue with working out at gym she noted improvement with overal aches.   Discussed joint protection and stretghtening exercises.     Follow-up in about 4 months (around 3/26/2019).          30min consultation with greater than 50% spent in counseling, chart review and coordination of care. All questions answered.

## 2018-11-27 DIAGNOSIS — M79.7 FIBROMYALGIA: ICD-10-CM

## 2018-11-27 RX ORDER — GABAPENTIN 100 MG/1
CAPSULE ORAL
Qty: 180 CAPSULE | Refills: 10 | Status: SHIPPED | OUTPATIENT
Start: 2018-11-27 | End: 2019-07-22

## 2019-01-04 ENCOUNTER — PATIENT MESSAGE (OUTPATIENT)
Dept: FAMILY MEDICINE | Facility: CLINIC | Age: 24
End: 2019-01-04

## 2019-01-11 DIAGNOSIS — L70.0 ACNE VULGARIS: ICD-10-CM

## 2019-01-11 RX ORDER — TRETINOIN 0.25 MG/G
CREAM TOPICAL NIGHTLY
Qty: 20 G | Refills: 1 | Status: SHIPPED | OUTPATIENT
Start: 2019-01-11 | End: 2019-01-17 | Stop reason: SDUPTHER

## 2019-01-17 DIAGNOSIS — L70.0 ACNE VULGARIS: ICD-10-CM

## 2019-01-17 RX ORDER — TRETINOIN 0.25 MG/G
CREAM TOPICAL NIGHTLY
Qty: 20 G | Refills: 1 | Status: SHIPPED | OUTPATIENT
Start: 2019-01-17 | End: 2019-03-18 | Stop reason: SDUPTHER

## 2019-01-25 RX ORDER — CITALOPRAM 20 MG/1
TABLET, FILM COATED ORAL
Qty: 45 TABLET | Refills: 4 | Status: SHIPPED | OUTPATIENT
Start: 2019-01-25 | End: 2019-04-26 | Stop reason: SDUPTHER

## 2019-02-27 RX ORDER — CYCLOBENZAPRINE HCL 5 MG
5 TABLET ORAL
Qty: 30 TABLET | Refills: 1 | Status: SHIPPED | OUTPATIENT
Start: 2019-02-27 | End: 2019-04-30 | Stop reason: SDUPTHER

## 2019-03-18 DIAGNOSIS — L70.0 ACNE VULGARIS: ICD-10-CM

## 2019-03-18 RX ORDER — TRETINOIN 0.25 MG/G
CREAM TOPICAL NIGHTLY
Qty: 20 G | Refills: 1 | Status: SHIPPED | OUTPATIENT
Start: 2019-03-18 | End: 2019-07-02 | Stop reason: ALTCHOICE

## 2019-04-01 ENCOUNTER — TELEPHONE (OUTPATIENT)
Dept: FAMILY MEDICINE | Facility: CLINIC | Age: 24
End: 2019-04-01
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
## 2019-04-01 NOTE — TELEPHONE ENCOUNTER
----- Message from Josiane St sent at 4/1/2019  9:48 AM CDT -----  Type: Needs Medical Advice    Who Called:  Self   Symptoms (please be specific):  EVGENY   How long has patient had these symptoms: EVGENY   Pharmacy name and phone #:  [facility]   Best Call Back Number: [phone]  Additional Information: Patient received a rabies vaccine from [facility] on Friday 03/29/2019 ,patient was bit by a racoon . Patient is schedule for (2) vaccine today at [facility]. Patient is schedule at the hospital to received (5) rabies vaccine total. Patient asking to get the rabies vaccine with the office.

## 2019-04-02 ENCOUNTER — PATIENT MESSAGE (OUTPATIENT)
Dept: FAMILY MEDICINE | Facility: CLINIC | Age: 24
End: 2019-04-02

## 2019-04-22 ENCOUNTER — PATIENT MESSAGE (OUTPATIENT)
Dept: RHEUMATOLOGY | Facility: CLINIC | Age: 24
End: 2019-04-22

## 2019-04-26 ENCOUNTER — TELEPHONE (OUTPATIENT)
Dept: FAMILY MEDICINE | Facility: CLINIC | Age: 24
End: 2019-04-26

## 2019-04-26 ENCOUNTER — OFFICE VISIT (OUTPATIENT)
Dept: FAMILY MEDICINE | Facility: CLINIC | Age: 24
End: 2019-04-26
Payer: COMMERCIAL

## 2019-04-26 VITALS
WEIGHT: 213.75 LBS | RESPIRATION RATE: 18 BRPM | HEART RATE: 80 BPM | TEMPERATURE: 98 F | SYSTOLIC BLOOD PRESSURE: 132 MMHG | DIASTOLIC BLOOD PRESSURE: 78 MMHG | BODY MASS INDEX: 37.88 KG/M2 | HEIGHT: 63 IN | OXYGEN SATURATION: 98 %

## 2019-04-26 DIAGNOSIS — J32.9 SINUSITIS, UNSPECIFIED CHRONICITY, UNSPECIFIED LOCATION: ICD-10-CM

## 2019-04-26 DIAGNOSIS — R51.9 NONINTRACTABLE HEADACHE, UNSPECIFIED CHRONICITY PATTERN, UNSPECIFIED HEADACHE TYPE: Primary | ICD-10-CM

## 2019-04-26 DIAGNOSIS — J98.9 REACTIVE AIRWAY DISEASE THAT IS NOT ASTHMA: ICD-10-CM

## 2019-04-26 DIAGNOSIS — R42 DIZZINESS: ICD-10-CM

## 2019-04-26 DIAGNOSIS — N91.2 AMENORRHEA: ICD-10-CM

## 2019-04-26 LAB
B-HCG UR QL: NEGATIVE
CTP QC/QA: YES

## 2019-04-26 PROCEDURE — 81025 POCT URINE PREGNANCY: ICD-10-PCS | Mod: S$GLB,,, | Performed by: NURSE PRACTITIONER

## 2019-04-26 PROCEDURE — 99214 OFFICE O/P EST MOD 30 MIN: CPT | Mod: S$GLB,,, | Performed by: NURSE PRACTITIONER

## 2019-04-26 PROCEDURE — 3008F BODY MASS INDEX DOCD: CPT | Mod: CPTII,S$GLB,, | Performed by: NURSE PRACTITIONER

## 2019-04-26 PROCEDURE — 99214 PR OFFICE/OUTPT VISIT, EST, LEVL IV, 30-39 MIN: ICD-10-PCS | Mod: S$GLB,,, | Performed by: NURSE PRACTITIONER

## 2019-04-26 PROCEDURE — 81025 URINE PREGNANCY TEST: CPT | Mod: S$GLB,,, | Performed by: NURSE PRACTITIONER

## 2019-04-26 PROCEDURE — 3008F PR BODY MASS INDEX (BMI) DOCUMENTED: ICD-10-PCS | Mod: CPTII,S$GLB,, | Performed by: NURSE PRACTITIONER

## 2019-04-26 RX ORDER — AZELASTINE 1 MG/ML
2 SPRAY, METERED NASAL
COMMUNITY
Start: 2018-12-12 | End: 2019-04-26

## 2019-04-26 RX ORDER — FLUCONAZOLE 150 MG/1
150 TABLET ORAL DAILY
Qty: 1 TABLET | Refills: 0 | Status: SHIPPED | OUTPATIENT
Start: 2019-04-26 | End: 2019-04-27

## 2019-04-26 RX ORDER — PREDNISONE 20 MG/1
40 TABLET ORAL DAILY
Qty: 20 TABLET | Refills: 0 | Status: SHIPPED | OUTPATIENT
Start: 2019-04-26 | End: 2019-05-06

## 2019-04-26 RX ORDER — AMOXICILLIN AND CLAVULANATE POTASSIUM 875; 125 MG/1; MG/1
1 TABLET, FILM COATED ORAL 2 TIMES DAILY
Qty: 14 TABLET | Refills: 0 | Status: SHIPPED | OUTPATIENT
Start: 2019-04-26 | End: 2019-05-03

## 2019-04-26 NOTE — TELEPHONE ENCOUNTER
Pt seen by PCP today Re: Migraines. Scheduled pt with Dr. Anthony for soonest appointment on 7/2/19 @ 12pm.     Is there any sooner availability for this pt to be seen?

## 2019-04-27 NOTE — PROGRESS NOTES
Subjective:       Patient ID: Renetta Durham is a 23 y.o. female.    Chief Complaint: Dizziness and Headache    Patient with c/o dizziness/HA over the past week. She has a long history of HEADACHE and has een referred to Dr Anthony but has not made an appointment yet. She states that she has dull head pain to above right eye and back of the head. No n/v. She was bitten by a raccoon on 3/29/19 and subsequently started rabies injections. Last rabies immunization 4/12/19. No other issues. She has also been having sinus issues. See URI ROS.      Sinusitis   This is a new problem. Episode onset: 10 days. The problem has been gradually worsening since onset. There has been no fever. Associated symptoms include coughing, headaches, sinus pressure, sneezing, a sore throat and swollen glands. Pertinent negatives include no congestion, diaphoresis, ear pain, hoarse voice, neck pain or shortness of breath.     Review of Systems   Constitutional: Positive for fatigue. Negative for activity change, appetite change and diaphoresis.   HENT: Positive for postnasal drip, rhinorrhea, sinus pressure, sinus pain, sneezing and sore throat. Negative for congestion, ear pain and hoarse voice.    Eyes: Negative for pain and redness.   Respiratory: Positive for cough and wheezing. Negative for choking, chest tightness and shortness of breath.    Cardiovascular: Negative for chest pain and palpitations.   Gastrointestinal: Negative for abdominal distention, abdominal pain, blood in stool, constipation, diarrhea, nausea and vomiting.   Endocrine: Negative for polydipsia and polyphagia.   Genitourinary: Negative for dysuria and hematuria.   Musculoskeletal: Negative for arthralgias, joint swelling, myalgias and neck pain.   Skin: Negative for color change, pallor and rash.   Neurological: Positive for dizziness and headaches. Negative for light-headedness.   Psychiatric/Behavioral: Negative for agitation and behavioral problems.       Past  "medical, surgical, family and social history reviewed.  Objective:     Vitals:    04/26/19 1234   BP: 132/78   Pulse: 80   Resp: 18   Temp: 98.1 °F (36.7 °C)   TempSrc: Oral   SpO2: 98%   Weight: 97 kg (213 lb 11.8 oz)   Height: 5' 3" (1.6 m)   PainSc:   6   PainLoc: Generalized     Body mass index is 37.86 kg/m².     Physical Exam   Constitutional: She is oriented to person, place, and time. She appears well-developed and well-nourished. No distress.   Obese female    HENT:   Head: Normocephalic and atraumatic.   Right Ear: Hearing, tympanic membrane, external ear and ear canal normal.   Left Ear: Hearing, tympanic membrane, external ear and ear canal normal.   Nose: Nose normal. No nose lacerations or sinus tenderness.   Mouth/Throat: Uvula is midline, oropharynx is clear and moist and mucous membranes are normal.   Eyes: Pupils are equal, round, and reactive to light. Conjunctivae and EOM are normal. Right eye exhibits no discharge. Left eye exhibits no discharge. No scleral icterus.   Neck: Trachea normal and normal range of motion. Neck supple. No JVD present. Carotid bruit is not present. No tracheal deviation present. No thyromegaly present.   Cardiovascular: Normal rate and regular rhythm. Exam reveals no gallop and no friction rub.   No murmur heard.  Pulmonary/Chest: Effort normal. No stridor. No respiratory distress. She has wheezes. She has no rales. She exhibits no tenderness.   Wheezing to upper lobes upon inspiration    Abdominal: Soft. Bowel sounds are normal. She exhibits no distension and no mass. There is no tenderness. There is no rebound and no guarding.   Musculoskeletal: Normal range of motion.   Lymphadenopathy:     She has cervical adenopathy.   Neurological: She is alert and oriented to person, place, and time. Coordination normal.   Skin: Skin is warm and dry. She is not diaphoretic.   Psychiatric: She has a normal mood and affect. Her behavior is normal. Judgment and thought content " normal.       Assessment:       1. Nonintractable headache, unspecified chronicity pattern, unspecified headache type    2. Amenorrhea    3. Sinusitis, unspecified chronicity, unspecified location    4. Reactive airway disease that is not asthma    5. Dizziness        Plan:       Renetta was seen today for dizziness and headache.    Diagnoses and all orders for this visit:    Nonintractable headache, unspecified chronicity pattern, unspecified headache type  -     Ambulatory consult to Neurology  ?this HEADACHE r/t rabies vaccine. I would like her to see neurology as previously recommended for chronic HEADACHES  Amenorrhea  -     POCT urine pregnancy    Sinusitis, unspecified chronicity, unspecified location    -     amoxicillin-clavulanate 875-125mg (AUGMENTIN) 875-125 mg per tablet; Take 1 tablet by mouth 2 (two) times daily. for 7 days    Reactive airway disease that is not asthma  -     predniSONE (DELTASONE) 20 MG tablet; Take 2 tablets (40 mg total) by mouth once daily. for 10 days    Dizziness  ? Unsure if this is a side effect of rabies vaccines or r/t sinus issues. She will let me know if this does not improve in the next 72 hours

## 2019-05-02 RX ORDER — CYCLOBENZAPRINE HCL 5 MG
5 TABLET ORAL
Qty: 30 TABLET | Refills: 1 | Status: SHIPPED | OUTPATIENT
Start: 2019-05-02 | End: 2019-06-22 | Stop reason: SDUPTHER

## 2019-06-24 RX ORDER — CITALOPRAM 20 MG/1
TABLET, FILM COATED ORAL
Qty: 45 TABLET | Refills: 3 | Status: SHIPPED | OUTPATIENT
Start: 2019-06-24 | End: 2019-09-15 | Stop reason: SDUPTHER

## 2019-06-25 RX ORDER — CYCLOBENZAPRINE HCL 5 MG
5 TABLET ORAL
Qty: 30 TABLET | Refills: 1 | Status: SHIPPED | OUTPATIENT
Start: 2019-06-25 | End: 2019-07-22 | Stop reason: SDUPTHER

## 2019-07-02 ENCOUNTER — TELEPHONE (OUTPATIENT)
Dept: PHARMACY | Facility: CLINIC | Age: 24
End: 2019-07-02

## 2019-07-02 ENCOUNTER — OFFICE VISIT (OUTPATIENT)
Dept: NEUROLOGY | Facility: CLINIC | Age: 24
End: 2019-07-02
Payer: COMMERCIAL

## 2019-07-02 VITALS
WEIGHT: 216.06 LBS | HEIGHT: 63 IN | RESPIRATION RATE: 16 BRPM | DIASTOLIC BLOOD PRESSURE: 79 MMHG | BODY MASS INDEX: 38.28 KG/M2 | SYSTOLIC BLOOD PRESSURE: 112 MMHG | HEART RATE: 87 BPM

## 2019-07-02 DIAGNOSIS — G43.701 CHRONIC MIGRAINE WITHOUT AURA WITH STATUS MIGRAINOSUS, NOT INTRACTABLE: ICD-10-CM

## 2019-07-02 DIAGNOSIS — G44.40 MEDICATION OVERUSE HEADACHE: ICD-10-CM

## 2019-07-02 DIAGNOSIS — H47.10 OPTIC NERVE EDEMA: Primary | ICD-10-CM

## 2019-07-02 DIAGNOSIS — G47.33 OBSTRUCTIVE SLEEP APNEA: ICD-10-CM

## 2019-07-02 DIAGNOSIS — R29.2 HYPERREFLEXIA: ICD-10-CM

## 2019-07-02 DIAGNOSIS — E66.9 OBESITY (BMI 30-39.9): ICD-10-CM

## 2019-07-02 PROCEDURE — 99999 PR PBB SHADOW E&M-EST. PATIENT-LVL IV: ICD-10-PCS | Mod: PBBFAC,,, | Performed by: PSYCHIATRY & NEUROLOGY

## 2019-07-02 PROCEDURE — 3008F PR BODY MASS INDEX (BMI) DOCUMENTED: ICD-10-PCS | Mod: CPTII,S$GLB,, | Performed by: PSYCHIATRY & NEUROLOGY

## 2019-07-02 PROCEDURE — 3008F BODY MASS INDEX DOCD: CPT | Mod: CPTII,S$GLB,, | Performed by: PSYCHIATRY & NEUROLOGY

## 2019-07-02 PROCEDURE — 99215 PR OFFICE/OUTPT VISIT, EST, LEVL V, 40-54 MIN: ICD-10-PCS | Mod: S$GLB,,, | Performed by: PSYCHIATRY & NEUROLOGY

## 2019-07-02 PROCEDURE — 99215 OFFICE O/P EST HI 40 MIN: CPT | Mod: S$GLB,,, | Performed by: PSYCHIATRY & NEUROLOGY

## 2019-07-02 PROCEDURE — 99999 PR PBB SHADOW E&M-EST. PATIENT-LVL IV: CPT | Mod: PBBFAC,,, | Performed by: PSYCHIATRY & NEUROLOGY

## 2019-07-02 RX ORDER — GALCANEZUMAB 120 MG/ML
INJECTION, SOLUTION SUBCUTANEOUS
Qty: 2 ML | Refills: 0 | Status: SHIPPED | OUTPATIENT
Start: 2019-07-02 | End: 2019-08-28

## 2019-07-02 RX ORDER — PROCHLORPERAZINE MALEATE 10 MG
10 TABLET ORAL EVERY 6 HOURS PRN
Qty: 60 TABLET | Refills: 11 | Status: SHIPPED | OUTPATIENT
Start: 2019-07-02 | End: 2020-11-18 | Stop reason: SDUPTHER

## 2019-07-02 RX ORDER — RIZATRIPTAN BENZOATE 10 MG/1
TABLET ORAL
Qty: 18 TABLET | Refills: 11 | Status: SHIPPED | OUTPATIENT
Start: 2019-07-02 | End: 2020-11-16

## 2019-07-02 NOTE — PROGRESS NOTES
Date of service: 7/2/2019  Referring provider: Rhonda Obando    Subjective:      Chief complaint: Migraine       Patient ID: Renetta Durham is a 24 y.o. lady with anxiety, asthma, depression, TMJ problems, fibromyalgia, narcolepsy presenting for the evaluation of headaches and is a new patient to me.     She follows with  for primary narcolepsy    History of Present Illness    ORIGINAL HEADACHE HISTORY - 7/2/19   Age at onset and course over time:  Started and diagnosed with migraines 8 years ago at age 16, no precipitating cause, but then was diagnosed with narcolepsy and that took over, migraines becoming steadily progressive over the years, this past year was the worst ever when they became every day. Recently black spots in her vision before a headache, last hours. Mom with multiple sclerosis. Grandma with cluster headaches.     She reports that she can hardly see out of her right eye, this is been for several years, was diagnosed with a cataract in the right eye.  Last formal eye exam was 2017 with Dr Park at which point her visual acuity was 20/20 and her optic discs were sharp bilaterally.     High school weight 134 lbs  Hill year rapid weight gain - 60 - 100 lbs  Current stable weight 216 lbs     Location:  Most of the time Back of head, neck, temples, above right eye  Quality:  [x] pressure [] tight [x] throbbing [] sharp [] stabbing   Severity:  Current 5, range 4 to 10  Duration:  Hr to days  Frequency:  Daily  Headaches awaken at night?:  Yes, once a month  Worst time of day: end of the day before bed   Associated with: [x] photophobia [x]  phonophobia [x] osmophobia [] blurred vision  [] double vision [x] loss of appetite [x] nausea [x] vomiting [x] dizziness [x] vertigo  [x] tinnitus [x] irritability [] sinus pressure [x] problems with concentration   [x] neck tightness   Alleviated by:  [] sleep [] darkness [] massage [] heat [] ice [] medication  Exacerbated by:  [x] fatigue  [x] light [x] noise [x] smells [x] coughing [] sneezing  [x] bending over [x] ovulation [x] menses [x] alcohol [x] change in weather [x]  stress  Ipsilateral autonomic: [] nasal congestion [] lacrimation [] ptosis [] injection [] edema [] foreign body sensation [] ear fullness   ICP:  Retin-A cream (x few months); used to get tunnel vision for a few minutes when she would bend over none since 2 months; high pitched ringing both ears; sometimes pulsatile tinnitus   Sleep habits: narcolepsy, does not sleep well during the night, naps during the day,  + TEA no cpap at this time (caused her to break out first night)   Caffeine intake: 1-2   Gyn status (if female):  On estrogen containing oral contraceptive with headache during placebo week. Prior to that was on depo shot since high school with significant resulting weight gain     Current acute treatment   Excedrin - 2 days per week  Ibuprofen - 4 days per week   Flexeril    Current prevention:  Gabapentin 300mg day/ 600mg at night   (Celexa)    Previously tried/failed acute treatment:  Fioricet    Previously tried/failed preventative treatment:  None     Review of patient's allergies indicates:   Allergen Reactions    No known drug allergies      Current Outpatient Medications   Medication Sig Dispense Refill    citalopram (CELEXA) 20 MG tablet Take 1 tablet (20 mg total) by mouth once daily. 90 tablet 3    cyclobenzaprine (FLEXERIL) 5 MG tablet TAKE 1 TABLET (5 MG TOTAL) BY MOUTH AS NEEDED FOR MUSCLE SPASMS. 30 tablet 1    fluticasone (FLONASE) 50 mcg/actuation nasal spray 1 spray by Each Nare route once daily. 3 Bottle 3    gabapentin (NEURONTIN) 100 MG capsule TAKE 3 CAPSULES TWICE A  capsule 10    gabapentin (NEURONTIN) 300 MG capsule Take 1 capsule (300 mg total) by mouth 3 (three) times daily. Patient taking 6 100mg tablets 90 capsule 3    ibuprofen (ADVIL,MOTRIN) 600 MG tablet Take 1 tablet (600 mg total) by mouth every 6 (six) hours as needed for  Pain. 20 tablet 0    pantoprazole (PROTONIX) 40 MG tablet Take 1 tablet by mouth Daily.      citalopram (CELEXA) 20 MG tablet TAKE 1 & 1/2 TABLETS BY MOUTH ONCE DAILY. 45 tablet 3    EMGALITY  mg/mL PnIj Inject 240mg (2 injections) subcutaneously at separate sites, once (loading dose). Start maintenance dose 28 days later. 2 mL 0    [START ON 7/30/2019] galcanezumab-gnlm (EMGALITY PEN) 120 mg/mL PnIj Inject 120 mg into the skin every 28 days. 1 mL 11    prochlorperazine (COMPAZINE) 10 MG tablet Take 1 tablet (10 mg total) by mouth every 6 (six) hours as needed (migraine or nausea). 60 tablet 11    rizatriptan (MAXALT) 10 MG tablet 1 tab PO PRN migraine. May repeat every 2 hours for max 3 tabs in 24 hours. Use no more than 10 days per month. 18 tablet 11     No current facility-administered medications for this visit.        Past Medical History  Past Medical History:   Diagnosis Date    Anxiety     Asthma     Depression     Fibromyalgia     Headache(784.0)     Migraine     Narcolepsy     Obesity, Class II, BMI 35-39.9, no comorbidity 5/7/2016    Urinary tract infection     Vision abnormalities     glasses       Past Surgical History  Past Surgical History:   Procedure Laterality Date    WISDOM TOOTH EXTRACTION         Family History  Family History   Problem Relation Age of Onset    Mental illness Other     Arthritis Mother     Asthma Sister         half sister    Miscarriages / Stillbirths Sister         half sister    Vision loss Maternal Aunt     Cancer Maternal Aunt     Arthritis Maternal Grandmother     Cancer Maternal Grandfather         colon, liver, lungs    Diabetes Paternal Grandmother     Heart disease Paternal Grandmother     Hypertension Paternal Grandmother     COPD Paternal Grandfather        Social History  Social History     Socioeconomic History    Marital status: Single     Spouse name: Not on file    Number of children: Not on file    Years of education: Not  on file    Highest education level: Not on file   Occupational History    Not on file   Social Needs    Financial resource strain: Not on file    Food insecurity:     Worry: Not on file     Inability: Not on file    Transportation needs:     Medical: Not on file     Non-medical: Not on file   Tobacco Use    Smoking status: Never Smoker    Smokeless tobacco: Never Used   Substance and Sexual Activity    Alcohol use: No    Drug use: No    Sexual activity: Yes     Partners: Male     Birth control/protection: Condom, OCP   Lifestyle    Physical activity:     Days per week: Not on file     Minutes per session: Not on file    Stress: Not on file   Relationships    Social connections:     Talks on phone: Not on file     Gets together: Not on file     Attends Evangelical service: Not on file     Active member of club or organization: Not on file     Attends meetings of clubs or organizations: Not on file     Relationship status: Not on file   Other Topics Concern    Not on file   Social History Narrative    Not on file        Review of Systems  14-point review of systems as follows:   No check raysa indicates NEGATIVE response   Constitutional: [] weight loss, [] change to appetite   Eyes: [x] change in vision, [] double vision   Ears, nose, mouth, throat: [] frequent nose bleeds, [] ringing in the ears   Respiratory: [] cough, [] wheezing   Cardiovascular: [] chest pain, [x] palpitations   Gastrointestinal: [] jaundice, [] nausea/vomiting   Genitourinary: [] incontinence, [] burning with urination   Hematologic/lymphatic: [] easy bruising/bleeding, [] night sweats   Neurological: [] numbness, [] weakness   Endocrine: [] fatigue, [] heat/cold intolerance   Allergy/Immunologic: [] fevers, [] chills   Musculoskeletal: [x] muscle pain, [x] joint pain   Psychiatric: [] thoughts of harming self/others, [] depression   Integumentary: [] rashes, [] sores that do not heal     Objective:        Vitals:    07/02/19 1058    BP: 112/79   Pulse: 87   Resp: 16     Body mass index is 38.27 kg/m².    Constitutional: appears in no acute distress, well-developed, well-nourished     Eyes: normal conjunctiva, PERRLA, right optic disc with blurred margins 360 deg and elevation, left optic disc flat and sharp. Color vision (HRR plates 5-10): 6/6 bilateral     Ears, nose, mouth, throat: external appearance of ears and nose normal, hearing intact     Cardiovascular: regular rate and rhythm, no murmurs appreciated    Respiratory: unlabored respirations, breath sounds normal bilaterally    Gastrointestinal: no visible abdominal masses, no guarding, no visible hernia    Musculoskeletal: normal tone in all four extremities. No atrophy. No abnormal movements. No pronator drift. No orbit. Symmetric finger tapping. Normal gait and station. Digits and nails normal.      Spine:   CERVICAL SPINE:  ROM: normal   MUSCLE SPASM:  Moderate  FACET LOADING:  Bilateral  SPURLING: no  ADITYA / JESSICA tender:  Bilateral    Psychiatric: normal judgment and insight. Oriented to person, place, and time.     Neurologic:   Cortical functions: recent and remote memory intact, normal attention span and concentration, speech fluent, adequate fund of knowledge   Cranial nerves: visual fields full, PERRLA, EOMI, symmetric facial strength, hearing intact, palate elevates symmetrically, shoulder shrug 5/5, tongue protrudes midline   Reflexes: 3+ in the upper and lower extremities, bilateral Villarreal, negative Babinski,  Sensation: intact to temperature throughout   Coordination: normal finger to nose    Data Review:     I have personally reviewed the referring provider's notes, labs, & imaging made available to me today.      RADIOLOGY STUDIES:  I have personally reviewed the pertinent images performed.     No results found for this or any previous visit.    Lab Results   Component Value Date     08/14/2017    K 4.2 08/14/2017    MG 2.1 03/30/2012     08/14/2017    CO2 25  08/14/2017    BUN 9 08/14/2017    CREATININE 0.9 08/14/2017    GLU 90 08/14/2017    HGBA1C 5.4 08/14/2017    AST 17 08/14/2017    ALT 24 08/14/2017    ALBUMIN 4.0 08/14/2017    PROT 7.8 08/14/2017    BILITOT 0.7 08/14/2017    CHOL 194 08/14/2017    HDL 34 (L) 08/14/2017    LDLCALC 131.0 08/14/2017    TRIG 145 08/14/2017       Lab Results   Component Value Date    WBC 9.72 08/14/2017    HGB 15.4 08/14/2017    HCT 44.7 08/14/2017    MCV 86 08/14/2017     08/14/2017       Lab Results   Component Value Date    TSH 2.024 08/14/2017           Assessment & Plan:       Problem List Items Addressed This Visit        Neuro    Chronic migraine without aura with status migrainosus, not intractable    Relevant Medications    galcanezumab-gnlm (EMGALITY PEN) 120 mg/mL PnIj (Start on 7/30/2019)    EMGALITY  mg/mL PnIj    prochlorperazine (COMPAZINE) 10 MG tablet    rizatriptan (MAXALT) 10 MG tablet    Medication overuse headache    Relevant Medications    prochlorperazine (COMPAZINE) 10 MG tablet    Hyperreflexia       Ophtho    Optic nerve edema - Primary    Relevant Orders    Ambulatory referral to Ophthalmology    MRV Brain Without Contrast    MRI Brain W WO Contrast    MRI Orbits W W/O Contrast       Endocrine    Obesity (BMI 30-39.9)       Other    Obstructive sleep apnea              Patient presented to headache Clinic for routine evaluation of headaches.  She has had longstanding chronic migraines and her current headache phenotype fits the same however in the previous year she has experienced progression of her headaches, with the inclusion of blurred vision which may or may not be an aura, transient visual obscurations like symptoms with tunnel vision precipitated by bending over, nonpulsatile and pulsatile tinnitus, and progressively worse vision in the right eye.  The above symptoms are concerning for pseudotumor cerebri  in addition to her chronic migraine and in fact she does have the appearance of  papilledema in the right eye.  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  She has never had a brain MRI.    We discussed urgent workup with brain imaging, ocular imaging, venous imaging to exclude other causes of elevated intracranial pressure and confirm radiographic changes consistent with pseudotumor cerebri.  I will also urgently refer her to the ophthalmologist for a dilated eye exam and the test listed below.  Once these tests are back, pseudotumor cerebral is still the most likely diagnosis then I will perform a lumbar puncture under fluoroscopy in the left lateral decubitus position for confirmation of opening pressure as confirmation of a normal CSF profile.  Was at lumbar punctures done if the diagnosis is still most appropriate then we will begin Diamox to improve her vision.     We discussed that the headaches have pseudotumor cerebri treated according to the phenotype in this case chronic migraine thus we need to begin a weight neutral preventative, I think Emgality is the most reasonable choice.  We will treat with migraine as needed.     Her risk factor for pseudotumor cerebri include - rapid weight gain associated with the Depo-Provera shot, the Depo-Provera shot itself, the current use of estrogen containing oral contraceptives, unmanaged sleep apnea, and hypervitomatosis A in the form of Retin-A cream.  These are all modifiable.  I normally asked for 20% weight loss but in that she has so many other secondary factors at play she might not need this significant weight loss for care.  I have asked her to return to using her CPAP, if she cannot locate that Dr. then I will refer her to our Sleep Clinic, she is to stop her birth control method right now in use only barrier methods, and stop the Retin-A cream.  She is actively trying to lose weight at the gym.    We discussed medication overuse headache which she is  currently at risk for.    On exam I also found that she is markedly hyperreflexic in the upper and lower extremities with a positive Villarreal and negative Babinski.  I commonly see this in young patients especially at antidepressants.  I do not suspect that she has a separate cervical pathology.  We will see with the imaging shows for example if she has a brain cause a myelopathy suggest Chiari malformation.  If needed in the future will image her neck but I do not think this takes priority and she needs to get the other tests done 1st.    She signed consent for lumbar puncture today      TESTING:  -- schedule a brain MRI with and without contrast   -- schedule and MRI orbits with and without contrast  -- schedule an MR venogram to assess blood vessels  -- one we have done these tests you will most likely need a lumbar puncture to confirm high pressure and exclude chronic meningitis     REFERRALS:  -- urgent referral to Dr. Gardner (ophthalmologist) to assess the health of your right optic nerve, take photos, visual fields, and do an OCT (ultrasound)     PREVENTION (use daily regardless of headache):  -- Start Emgality injection once every 28 days (2 prescriptions, first month give yourself TWO shots, all other months give yourself ONE shot) (will come from [facility], they will call you)    AS-NEEDED TREATMENT (use total no more than 10 days per month unless otherwise stated):  -- Start Maxalt (rizatriptan) at onset of migraine. May repeat every 2 hours. No more than 3 doses per day, 10 days per month  -- Start Compazine 10 mg, half or full tablet up to 4 times a day as needed for mild headache (nausea pill that also helps headache)      No follow-ups on file.  Lumbar puncture after the testing    Yaz Anthony MD

## 2019-07-02 NOTE — PATIENT INSTRUCTIONS
TESTING:  -- schedule a brain MRI with and without contrast   -- schedule and MRI orbits with and without contrast  -- schedule an MR venogram to assess blood vessels  -- one we have done these tests you will most likely need a lumbar puncture to confirm high pressure and exclude chronic meningitis     REFERRALS:  -- urgent referral to Dr. Gardner (ophthalmologist) to assess the health of your right optic nerve, take photos, visual fields, and do an OCT (ultrasound)     PREVENTION (use daily regardless of headache):  -- Start Emgality injection once every 28 days (2 prescriptions, first month give yourself TWO shots, all other months give yourself ONE shot) (will come from [facility], they will call you)  -- STOP YOUR BIRTH CONTROL  -- STOP YOUR RETIN A   -- RETURN TO USING THE CPAP   -- ALL 3 THINGS CAUSE / CONTRIBUTE TO PSEUDOTUMOR CEREBRI     AS-NEEDED TREATMENT (use total no more than 10 days per month unless otherwise stated):  -- Start Maxalt (rizatriptan) at onset of migraine. May repeat every 2 hours. No more than 3 doses per day, 10 days per month  -- Start Compazine 10 mg, half or full tablet up to 4 times a day as needed for mild headache (nausea pill that also helps headache)

## 2019-07-02 NOTE — LETTER
July 2, 2019      [place]  [address]  [facility] [address]           [facility] [place]  [address] [facility] [address] [place] [address]  Phone: [phone]  Fax: [phone]          Patient: Renetta Durham   MR Number: [id number]   YOB: 1995   Date of Visit: 7/2/2019       Dear Rhonda Obando:    Thank you for referring Renetta Durham to me for evaluation. Attached you will find relevant portions of my assessment and plan of care.    If you have questions, please do not hesitate to call me. I look forward to following Renetta Durham along with you.    Sincerely,    Yaz Anthony MD    Enclosure  CC:  No Recipients    If you would like to receive this communication electronically, please contact [email or web address] or [phone] to request more information on [employer] Link access.    For providers and/or their staff who would like to refer a patient to [facility], please contact us through our one-stop-shop provider referral line, [facility], at [phone].    If you feel you have received this communication in error or would no longer like to receive these types of communications, please e-mail [email or web address]

## 2019-07-03 ENCOUNTER — TELEPHONE (OUTPATIENT)
Dept: OPHTHALMOLOGY | Facility: CLINIC | Age: 24
End: 2019-07-03

## 2019-07-05 ENCOUNTER — HOSPITAL ENCOUNTER (OUTPATIENT)
Dept: RADIOLOGY | Facility: HOSPITAL | Age: 24
Discharge: HOME | End: 2019-07-05
Attending: PSYCHIATRY & NEUROLOGY
Payer: COMMERCIAL

## 2019-07-05 DIAGNOSIS — G43.701 CHRONIC MIGRAINE WITHOUT AURA WITH STATUS MIGRAINOSUS, NOT INTRACTABLE: Primary | ICD-10-CM

## 2019-07-05 DIAGNOSIS — H47.10 OPTIC NERVE EDEMA: ICD-10-CM

## 2019-07-05 DIAGNOSIS — G43.701 CHRONIC MIGRAINE W/O AURA, NOT INTRACTABLE, W STAT MIGR: ICD-10-CM

## 2019-07-05 PROCEDURE — 70544 MR ANGIOGRAPHY HEAD W/O DYE: CPT | Mod: TC

## 2019-07-05 PROCEDURE — 70544 MR ANGIOGRAPHY HEAD W/O DYE: CPT | Mod: 26,,, | Performed by: RADIOLOGY

## 2019-07-05 PROCEDURE — 70544 MRV BRAIN WITHOUT CONTRAST: ICD-10-PCS | Mod: 26,,, | Performed by: RADIOLOGY

## 2019-07-05 RX ORDER — SODIUM CHLORIDE, SODIUM LACTATE, POTASSIUM CHLORIDE, CALCIUM CHLORIDE 600; 310; 30; 20 MG/100ML; MG/100ML; MG/100ML; MG/100ML
INJECTION, SOLUTION INTRAVENOUS CONTINUOUS
Status: CANCELLED | OUTPATIENT
Start: 2019-07-18

## 2019-07-06 ENCOUNTER — HOSPITAL ENCOUNTER (OUTPATIENT)
Dept: RADIOLOGY | Facility: HOSPITAL | Age: 24
Discharge: HOME | End: 2019-07-06
Attending: PSYCHIATRY & NEUROLOGY
Payer: COMMERCIAL

## 2019-07-06 DIAGNOSIS — H47.10 OPTIC NERVE EDEMA: ICD-10-CM

## 2019-07-06 PROCEDURE — 70553 MRI HEAD-ORBITS W/WO CONTRAST (XPD): ICD-10-PCS | Mod: 26,,, | Performed by: RADIOLOGY

## 2019-07-06 PROCEDURE — 70543 MRI ORBT/FAC/NCK W/O &W/DYE: CPT | Mod: 26,,, | Performed by: RADIOLOGY

## 2019-07-06 PROCEDURE — 70543 MRI HEAD-ORBITS W/WO CONTRAST (XPD): ICD-10-PCS | Mod: 26,,, | Performed by: RADIOLOGY

## 2019-07-06 PROCEDURE — 70553 MRI BRAIN STEM W/O & W/DYE: CPT

## 2019-07-06 PROCEDURE — 25500020 PHARM REV CODE 255: Performed by: PSYCHIATRY & NEUROLOGY

## 2019-07-06 PROCEDURE — A9585 GADOBUTROL INJECTION: HCPCS | Performed by: PSYCHIATRY & NEUROLOGY

## 2019-07-06 PROCEDURE — 70543 MRI ORBT/FAC/NCK W/O &W/DYE: CPT | Mod: TC

## 2019-07-06 PROCEDURE — 70553 MRI BRAIN STEM W/O & W/DYE: CPT | Mod: 26,,, | Performed by: RADIOLOGY

## 2019-07-06 RX ORDER — GADOBUTROL 604.72 MG/ML
9 INJECTION INTRAVENOUS
Status: COMPLETED | OUTPATIENT
Start: 2019-07-06 | End: 2019-07-06

## 2019-07-06 RX ADMIN — GADOBUTROL 9 ML: 604.72 INJECTION INTRAVENOUS at 02:07

## 2019-07-08 NOTE — TELEPHONE ENCOUNTER
Submitted prior authorization for EMGALTIY to insurance company for review on 7/8/2019 5:24 PM [facility]

## 2019-07-09 ENCOUNTER — OFFICE VISIT (OUTPATIENT)
Dept: OPHTHALMOLOGY | Facility: CLINIC | Age: 24
End: 2019-07-09
Payer: COMMERCIAL

## 2019-07-09 ENCOUNTER — TELEPHONE (OUTPATIENT)
Dept: OPHTHALMOLOGY | Facility: CLINIC | Age: 24
End: 2019-07-09

## 2019-07-09 DIAGNOSIS — G43.701 CHRONIC MIGRAINE WITHOUT AURA WITH STATUS MIGRAINOSUS, NOT INTRACTABLE: ICD-10-CM

## 2019-07-09 DIAGNOSIS — H47.10 OPTIC NERVE EDEMA: Primary | ICD-10-CM

## 2019-07-09 PROCEDURE — 92004 COMPRE OPH EXAM NEW PT 1/>: CPT | Mod: S$GLB,,, | Performed by: OPHTHALMOLOGY

## 2019-07-09 PROCEDURE — 92004 PR EYE EXAM, NEW PATIENT,COMPREHESV: ICD-10-PCS | Mod: S$GLB,,, | Performed by: OPHTHALMOLOGY

## 2019-07-09 PROCEDURE — 99999 PR PBB SHADOW E&M-EST. PATIENT-LVL II: CPT | Mod: PBBFAC,,, | Performed by: OPHTHALMOLOGY

## 2019-07-09 PROCEDURE — 99999 PR PBB SHADOW E&M-EST. PATIENT-LVL II: ICD-10-PCS | Mod: PBBFAC,,, | Performed by: OPHTHALMOLOGY
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
## 2019-07-09 NOTE — Clinical Note
No optic disc edema noted on exam, fundus photos were takenNo RAPD, EOM deficits, and color vision intact PT instructed and issued HEP    Pt had no questions/concerns following instruction

## 2019-07-09 NOTE — LETTER
July 9, 2019      Yaz Anthony MD  [address] [facility] [address]  [address]  [facility] [address]           [facility]  [address] [facility] [address] [place] [address]  Phone: [phone]  Fax: [phone]          Patient: Renetta Durham   MR Number: [id number]   YOB: 1995   Date of Visit: 7/9/2019       Dear Dr. Yaz Anthony:    Thank you for referring Renetta Durham to me for evaluation. Attached you will find relevant portions of my assessment and plan of care.    If you have questions, please do not hesitate to call me. I look forward to following Renetta Durham along with you.    Sincerely,    Dallas Gardner Jr., MD    Enclosure  CC:  No Recipients    If you would like to receive this communication electronically, please contact [email or web address] or [phone] to request more information on [employer] Link access.    For providers and/or their staff who would like to refer a patient to [facility], please contact us through our one-stop-shop provider referral line, [facility], at [phone].    If you feel you have received this communication in error or would no longer like to receive these types of communications, please e-mail [email or web address]

## 2019-07-15 ENCOUNTER — TELEPHONE (OUTPATIENT)
Dept: NEUROLOGY | Facility: CLINIC | Age: 24
End: 2019-07-15

## 2019-07-15 ENCOUNTER — PATIENT MESSAGE (OUTPATIENT)
Dept: NEUROLOGY | Facility: CLINIC | Age: 24
End: 2019-07-15

## 2019-07-15 NOTE — TELEPHONE ENCOUNTER
----- Message from Yaz Anthony MD sent at 7/15/2019  9:42 AM CDT -----  Please cancel the 7/18 lumbar puncture

## 2019-07-22 ENCOUNTER — OFFICE VISIT (OUTPATIENT)
Dept: RHEUMATOLOGY | Facility: CLINIC | Age: 24
End: 2019-07-22
Payer: COMMERCIAL

## 2019-07-22 VITALS
SYSTOLIC BLOOD PRESSURE: 119 MMHG | HEIGHT: 63 IN | BODY MASS INDEX: 38.87 KG/M2 | WEIGHT: 219.38 LBS | DIASTOLIC BLOOD PRESSURE: 81 MMHG | HEART RATE: 80 BPM

## 2019-07-22 DIAGNOSIS — M79.7 FIBROMYALGIA: Primary | ICD-10-CM

## 2019-07-22 PROCEDURE — 99214 OFFICE O/P EST MOD 30 MIN: CPT | Mod: S$GLB,,, | Performed by: INTERNAL MEDICINE

## 2019-07-22 PROCEDURE — 3008F PR BODY MASS INDEX (BMI) DOCUMENTED: ICD-10-PCS | Mod: CPTII,S$GLB,, | Performed by: INTERNAL MEDICINE

## 2019-07-22 PROCEDURE — 99999 PR PBB SHADOW E&M-EST. PATIENT-LVL III: CPT | Mod: PBBFAC,,, | Performed by: INTERNAL MEDICINE

## 2019-07-22 PROCEDURE — 99214 PR OFFICE/OUTPT VISIT, EST, LEVL IV, 30-39 MIN: ICD-10-PCS | Mod: S$GLB,,, | Performed by: INTERNAL MEDICINE

## 2019-07-22 PROCEDURE — 3008F BODY MASS INDEX DOCD: CPT | Mod: CPTII,S$GLB,, | Performed by: INTERNAL MEDICINE

## 2019-07-22 PROCEDURE — 99999 PR PBB SHADOW E&M-EST. PATIENT-LVL III: ICD-10-PCS | Mod: PBBFAC,,, | Performed by: INTERNAL MEDICINE

## 2019-07-22 RX ORDER — GABAPENTIN 300 MG/1
600 CAPSULE ORAL 2 TIMES DAILY
Qty: 120 CAPSULE | Refills: 11 | Status: SHIPPED | OUTPATIENT
Start: 2019-07-22 | End: 2020-12-29 | Stop reason: SDUPTHER

## 2019-07-22 RX ORDER — CYCLOBENZAPRINE HCL 5 MG
5 TABLET ORAL
Qty: 30 TABLET | Refills: 6 | Status: SHIPPED | OUTPATIENT
Start: 2019-07-22 | End: 2021-02-22

## 2019-07-22 NOTE — LETTER
July 22, 2019      Owen Delgado MD  [facility] [facility] [address]           [facility]  [address] [facility] [address] [place] [address]  Phone: [phone]  Fax: [phone]          Patient: Renetta Durham   MR Number: [id number]   YOB: 1995   Date of Visit: 7/22/2019       Dear Dr. Owen Delgado:    Thank you for referring Renetta Durham to me for evaluation. Attached you will find relevant portions of my assessment and plan of care.    If you have questions, please do not hesitate to call me. I look forward to following Renetta Durham along with you.    Sincerely,    Madelyn Navas, DO    Enclosure  CC:  No Recipients    If you would like to receive this communication electronically, please contact [email or web address] or [phone] to request more information on [employer] Link access.    For providers and/or their staff who would like to refer a patient to [facility], please contact us through our one-stop-shop provider referral line, [facility], at [phone].    If you feel you have received this communication in error or would no longer like to receive these types of communications, please e-mail [email or web address]

## 2019-07-22 NOTE — PROGRESS NOTES
Subjective:          Chief Complaint: Renetta Durham is a 24 y.o. female who had concerns including Fibromyalgia (6 month).    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     States she is about the same she is going to the gym which does seem to help she's not sleeping very well.  Hips are painful, upper trapezius and cervical region pain all the time. Off from school now. She denies numbness and tingling or weakness. Some tingling in hands mostly when supine (positional)         Recently seen with neurology regarding her narcolepsy was diagnosed sometime around 15y/o.    She has been on various medications that seem to exacerbate her anxiety.  She was complaining of widespread musculoskeletal pain.  His mother with a history of fibromyalgia.  Patient was seen in the past by physical medicine and rehabilitation regarding myalgias and sacroiliac joint dysfunction was referred for physical therapy which appear she see physical therapy..  She also received a few trigger point injection   Currently on Celexa and Elavil.  She stopped taking Elavil more recently as she felt sedated the next morning.  Previosuly trialed with baclofen no significant relief. No improvement with Tramadol.  Trazodone did not help with sleep  She has been seen by her psychiatrist Dr. Nixon.  Previously had been on Zoloft.  She does admit to significant marijuana use which may be exacerbating some of her widespread fatigue since her visit with Dr. Nixon states that she has markedly reduced MJ use.  She was endorsing difficulty with sleep or sleep during the daytime.  She has hx of TEA did not tolerate CPAP with rashes.     Patient with aches and  pain for shoulders, upper arms, lower arms, around her greater trochanter region, chest , upper back.  Specific swelling of joints no specific morning stiffness or change with or without activity.  She is relatively sedentary. She notes aches with light touch. Patient denies weight loss, rashes, dry eye, dry mouth, nasal or palatal ulcerations,  lymphadenopathy, Raynaud's, hx of DVT/miscarriages, psoriasis or family hx of psoriasis, rashes, serositis, anemia or other constitutional symptoms.   . She notes fatigue but she was diagnosed with narcolepsy stating most of her episodes involve some hallucinations and extreme fatigue. She notes improvement with Vyvanse but exacerbates anxiety.     REVIEW OF SYSTEMS:    Review of Systems   Constitutional: Positive for malaise/fatigue. Negative for fever and weight loss.   HENT: Negative for sore throat.    Eyes: Negative for double vision, photophobia and redness.   Respiratory: Negative for cough, shortness of breath and wheezing.    Cardiovascular: Negative for chest pain, palpitations and orthopnea.   Gastrointestinal: Negative for abdominal pain, constipation and diarrhea.   Genitourinary: Negative for dysuria, hematuria and urgency.   Musculoskeletal: Positive for joint pain and myalgias. Negative for back pain.   Skin: Negative for rash.   Neurological: Negative for dizziness, tingling, focal weakness, seizures and headaches.   Endo/Heme/Allergies: Does not bruise/bleed easily.   Psychiatric/Behavioral: Negative for depression, hallucinations and suicidal ideas.               Objective:            Past Medical History:   Diagnosis Date    Anxiety     Asthma     Depression     Fibromyalgia     Headache(784.0)     Migraine     Narcolepsy     Obesity, Class II, BMI 35-39.9, no comorbidity 5/7/2016    Urinary tract infection     Vision abnormalities     glasses     Family History   Problem Relation Age of Onset    Mental illness Other     Arthritis Mother      Asthma Sister         half sister    Miscarriages / Stillbirths Sister         half sister    Vision loss Maternal Aunt     Cancer Maternal Aunt     Arthritis Maternal Grandmother     Cataracts Maternal Grandmother     Cancer Maternal Grandfather         colon, liver, lungs    Diabetes Paternal Grandmother     Heart disease Paternal Grandmother     Hypertension Paternal Grandmother     COPD Paternal Grandfather     Amblyopia Neg Hx     Blindness Neg Hx     Glaucoma Neg Hx     Macular degeneration Neg Hx     Retinal detachment Neg Hx     Strabismus Neg Hx     Stroke Neg Hx     Thyroid disease Neg Hx      Social History     Tobacco Use    Smoking status: Never Smoker    Smokeless tobacco: Never Used   Substance Use Topics    Alcohol use: No    Drug use: No         Current Outpatient Medications on File Prior to Visit   Medication Sig Dispense Refill    citalopram (CELEXA) 20 MG tablet Take 1 tablet (20 mg total) by mouth once daily. 90 tablet 3    citalopram (CELEXA) 20 MG tablet TAKE 1 & 1/2 TABLETS BY MOUTH ONCE DAILY. 45 tablet 3    cyclobenzaprine (FLEXERIL) 5 MG tablet TAKE 1 TABLET (5 MG TOTAL) BY MOUTH AS NEEDED FOR MUSCLE SPASMS. 30 tablet 1    fluticasone (FLONASE) 50 mcg/actuation nasal spray 1 spray by Each Nare route once daily. 3 Bottle 3    gabapentin (NEURONTIN) 100 MG capsule TAKE 3 CAPSULES TWICE A DAY (Patient taking differently: TAKE 6 CAPSULES A DAY) 180 capsule 10    ibuprofen (ADVIL,MOTRIN) 600 MG tablet Take 1 tablet (600 mg total) by mouth every 6 (six) hours as needed for Pain. 20 tablet 0    prochlorperazine (COMPAZINE) 10 MG tablet Take 1 tablet (10 mg total) by mouth every 6 (six) hours as needed (migraine or nausea). 60 tablet 11    rizatriptan (MAXALT) 10 MG tablet 1 tab PO PRN migraine. May repeat every 2 hours for max 3 tabs in 24 hours. Use no more than 10 days per month. 18 tablet 11    EMGALITY  mg/mL PnIj Inject 240mg (2 injections)  subcutaneously at separate sites, once (loading dose). Start maintenance dose 28 days later. 2 mL 0    gabapentin (NEURONTIN) 300 MG capsule Take 1 capsule (300 mg total) by mouth 3 (three) times daily. Patient taking 6 100mg tablets 90 capsule 3    [START ON 7/30/2019] galcanezumab-gnlm (EMGALITY PEN) 120 mg/mL PnIj Inject 120 mg into the skin every 28 days. 1 mL 11    pantoprazole (PROTONIX) 40 MG tablet Take 1 tablet by mouth Daily.       No current facility-administered medications on file prior to visit.        Vitals:    07/22/19 1441   BP: 119/81   Pulse: 80       Physical Exam:    Physical Exam   Constitutional: She appears well-developed and well-nourished.   HENT:   Nose: No septal deviation.   Mouth/Throat: Mucous membranes are normal. No oral lesions.   Eyes: Pupils are equal, round, and reactive to light. Right conjunctiva is not injected. Left conjunctiva is not injected.   Neck: No JVD present. No thyroid mass and no thyromegaly present.   Cardiovascular: Normal rate, regular rhythm and normal pulses.   No edema   Pulmonary/Chest: Effort normal and breath sounds normal.   Abdominal: Soft. Normal appearance. There is no hepatosplenomegaly.   Musculoskeletal:        Right shoulder: She exhibits tenderness. She exhibits normal range of motion and no swelling.        Left shoulder: She exhibits tenderness. She exhibits normal range of motion and no swelling.        Right elbow: She exhibits normal range of motion and no swelling. Tenderness found. Medial epicondyle tenderness noted.        Left elbow: She exhibits normal range of motion and no swelling. Tenderness found. Medial epicondyle tenderness noted.        Right wrist: She exhibits normal range of motion, no tenderness and no swelling.        Left wrist: She exhibits normal range of motion, no tenderness and no swelling.        Right hip: She exhibits bony tenderness. She exhibits normal range of motion, normal strength and no swelling.         Left hip: She exhibits bony tenderness. She exhibits normal range of motion, no tenderness and no swelling.        Right knee: She exhibits normal range of motion and no swelling. No tenderness found.        Left knee: She exhibits normal range of motion and no swelling. No tenderness found.        Right ankle: She exhibits normal range of motion and no swelling. No tenderness.        Left ankle: She exhibits normal range of motion and no swelling. No tenderness.   10/18 FMS tenderpoints   No joint swelling or tenderness of PIP, MCP, wrist, elbow, shoulder, or knee joints    Patient with hypermobility at the thumbs, elbows   [place] 6     Lymphadenopathy:     She has no cervical adenopathy.     She has no axillary adenopathy.   Neurological: She has normal strength and normal reflexes.   Skin: Skin is dry and intact.   Psychiatric: She has a normal mood and affect.             Assessment:       Encounter Diagnosis   Name Primary?    Fibromyalgia Yes          Plan:        Fibromyalgia    Other orders  -     gabapentin (NEURONTIN) 300 MG capsule; Take 2 capsules (600 mg total) by mouth 2 (two) times daily.  Dispense: 120 capsule; Refill: 11  -     cyclobenzaprine (FLEXERIL) 5 MG tablet; Take 1 tablet (5 mg total) by mouth as needed for Muscle spasms.  Dispense: 30 tablet; Refill: 6       patient with  fibromyalgia her WPI: 14/ SSS: 10.  Nonrestorative sleep pattern but admittedly going to bed at 0200 getting up at 1100.     Did do well with gabapentin 600mg at bedtime.  Flexeril 5mg PRN  Continue with working out at gym she noted improvement with overal aches.   Discussed joint protection and stretghtening exercises.     Follow up in about 4 months (around 11/22/2019).          30min consultation with greater than 50% spent in counseling, chart review and coordination of care. All questions answered.

## 2019-07-23 ENCOUNTER — CLINICAL SUPPORT (OUTPATIENT)
Dept: OPHTHALMOLOGY | Facility: CLINIC | Age: 24
End: 2019-07-23
Payer: COMMERCIAL

## 2019-07-23 ENCOUNTER — OFFICE VISIT (OUTPATIENT)
Dept: OPHTHALMOLOGY | Facility: CLINIC | Age: 24
End: 2019-07-23
Payer: COMMERCIAL

## 2019-07-23 DIAGNOSIS — H52.7 REFRACTIVE ERROR: ICD-10-CM

## 2019-07-23 DIAGNOSIS — H52.13 MYOPIC ASTIGMATISM OF BOTH EYES: ICD-10-CM

## 2019-07-23 DIAGNOSIS — G43.701 CHRONIC MIGRAINE WITHOUT AURA WITH STATUS MIGRAINOSUS, NOT INTRACTABLE: Primary | ICD-10-CM

## 2019-07-23 DIAGNOSIS — H47.10 OPTIC NERVE EDEMA: ICD-10-CM

## 2019-07-23 DIAGNOSIS — H52.203 MYOPIC ASTIGMATISM OF BOTH EYES: ICD-10-CM

## 2019-07-23 PROCEDURE — 92014 PR EYE EXAM, EST PATIENT,COMPREHESV: ICD-10-PCS | Mod: S$GLB,,, | Performed by: OPHTHALMOLOGY

## 2019-07-23 PROCEDURE — 99999 PR PBB SHADOW E&M-EST. PATIENT-LVL III: CPT | Mod: PBBFAC,,, | Performed by: OPHTHALMOLOGY

## 2019-07-23 PROCEDURE — 92015 PR REFRACTION: ICD-10-PCS | Mod: S$GLB,,, | Performed by: OPHTHALMOLOGY

## 2019-07-23 PROCEDURE — 92015 DETERMINE REFRACTIVE STATE: CPT | Mod: S$GLB,,, | Performed by: OPHTHALMOLOGY

## 2019-07-23 PROCEDURE — 99999 PR PBB SHADOW E&M-EST. PATIENT-LVL III: ICD-10-PCS | Mod: PBBFAC,,, | Performed by: OPHTHALMOLOGY

## 2019-07-23 PROCEDURE — 92014 COMPRE OPH EXAM EST PT 1/>: CPT | Mod: S$GLB,,, | Performed by: OPHTHALMOLOGY

## 2019-07-23 NOTE — PROGRESS NOTES
HPI     Follow-up      Additional comments: 2 wk f/u for hvf and oct n review               Comments     DLS: 7/8/19    Pt states eyes feel tired today but no pain.           Last edited by Cheryle Quintana on 7/23/2019 10:06 AM. (History)        ROS     Negative for: Constitutional, Gastrointestinal, Neurological, Skin,   Genitourinary, Musculoskeletal, HENT, Endocrine, Cardiovascular, Eyes,   Respiratory, Psychiatric, Allergic/Imm, Heme/Lymph    Last edited by Dallas Gardner Jr., MD on 7/23/2019 11:08 AM. (History)        Assessment /Plan     For exam results, see Encounter Report.    Chronic migraine without aura with status migrainosus, not intractable  Refer to neurology for management, HVF- WNL  OCT ON- borderline thinning changes but essentially wnl  Myopic astigmatism of both eyes    Refractive error  Rx for glasses given  Follow up in about 1 year (around 7/23/2020) for Annual.

## 2019-07-23 NOTE — PATIENT INSTRUCTIONS
Understanding Focusing Problems  Your vision depends on how light is focused in your eye. An eye has different parts to it, such as the cornea, lens, and iris. These parts work together to refract, or bend, and focus light rays. With normal vision, light is focused on the back of the eyeball. This area is called the retina. But in some cases, all or part of the eye is not the right shape. This causes light to focus in the wrong place. It makes vision blurry. Three common problems with focusing include hyperopia, myopia, and astigmatism.        Emmetropia       Hyperopia      Emmetropia or normal vision happens when light focuses on the retina.  Hyperopia or farsightedness occurs when light focuses behind the retina. Nearby objects look blurry.     Myopia       Astigmatism      Myopia or nearsightedness occurs when light focuses in front of the retina. Distant objects look blurry.  Astigmatism occurs when light focuses in more than one place. Both nearby and distant objects can look blurry.  If youre over 40  With age, the lens becomes stiff. Stiffening of the lens makes it more difficult for the lens to accommodate, or change shape, to focus light. This leads to presbyopia, or trouble focusing on nearby objects.   Date Last Reviewed: 8/16/2015  © 2930-6108 [employer]. [address] [place] [address], [place] [address]. All rights reserved. This information is not intended as a substitute for professional medical care. Always follow your healthcare professional's instructions.

## 2019-07-25 ENCOUNTER — TELEPHONE (OUTPATIENT)
Dept: PHARMACY | Facility: CLINIC | Age: 24
End: 2019-07-25

## 2019-07-25 NOTE — TELEPHONE ENCOUNTER
Initial Emgality consult completed on 19. Emgality 240mg (loading dose) will be shipped on 19 to arrive at patient's home on 19 via [employer]. $0.00 copay. Patient intends to start Emgality once received. Address confirmed. Confirmed 2 patient identifiers - name and . Therapy Appropriate.    Indication: Migraine prophlaxis  goals of treatment: reduction in migraine frequency, intensity, and/or duration.     --Injection experience: None  Informed patient on online injection video on  website. Links sent via [employer].    Store in refrigerator prior to use (do not freeze, do not shake, keep in original box until use).    Counseled patient on administration directions:  - Inject two injections (240mg) into the skin as a loading dose, followed by one injection (120mg) once every 4 weeks thereafter.   -  Advised patient to keep a calendar to stay compliant.   - Take out of the refrigerator 30-60 minutes prior to injection to reach room temperature.  - Wash hands before and after injection.  - Monthly RX will come with gauze, band aids, and alcohol swabs.  - Patient may self-inject in either the front/top of the thighs, abdomen- but at least 2 inches away from belly button   - If someone else is giving the injection, they may also use the outer part of your upper arm or your buttocks.   - If injecting 2 pens for the loading dose, use 2 different injection sites.   - Patient was instructed to rotate injections sites monthly.  - Inspect medication - should be clear and colorless to slightly yellow to slightly brown.   - Patient is to wipe down the injection site with the alcohol pad, wait to dry.    - Remove white base cap from pen (twist to remove).  - Place the pen flat against the injection site and turn the lock ring to the unlocked position then push down and hold the teal button - there will be an initial click; in 10 seconds you will hear a second click.  - Remove the  pen from skin and check to see if the gray plunger is visible - this will indicate that the injection is complete.   - Patient will use sharps container; once full, per state law, she/he may securely lock the sharps container and place in trash. Pharmacy will replace the sharps at no additional charge.    Patient was counseled on possible side effects:  - Injection site reaction: redness, soreness, itching, bruising, which should resolve within 3-5 days.  - Allergic reactions: itching, rash, hives, swelling of face, mouth, tongue, throat, trouble breathing.   - Informed that there is no data in pregnancy/breastfeeding.     Consultation included the importance of compliance and of keeping all follow up appointments.  Patient understands to report any medication changes to [facility] and provider. All questions answered and addressed to patients satisfaction. RPh will touchbase with pt in 7 days and [facility] will contact patient in 3 weeks for refills.    Deven Crisostomo, NichoD  Clinical Pharmacist  [facility]  P: [phone]    [facility] sent to provider on 7/25/19

## 2019-07-25 NOTE — TELEPHONE ENCOUNTER
DOCUMENTATION ONLY:  Prior Authorization for Emgality approved from 07/25/19 to 01/19/2020    Case Id: [id number]    Co-pay: $272.76 - $0 with Emgality E-Voucher applied.    Forwarded to the clinical pharmacist for consult and shipment.    -ARR

## 2019-09-15 RX ORDER — CITALOPRAM 20 MG/1
TABLET, FILM COATED ORAL
Qty: 45 TABLET | Refills: 3 | Status: SHIPPED | OUTPATIENT
Start: 2019-09-15 | End: 2020-01-06

## 2019-09-19 ENCOUNTER — TELEPHONE (OUTPATIENT)
Dept: PHARMACY | Facility: CLINIC | Age: 24
End: 2019-09-19

## 2019-10-08 RX ORDER — NORGESTIMATE AND ETHINYL ESTRADIOL 0.25-0.035
KIT ORAL
Qty: 84 TABLET | Refills: 3 | Status: SHIPPED | OUTPATIENT
Start: 2019-10-08 | End: 2020-06-05

## 2019-10-23 ENCOUNTER — TELEPHONE (OUTPATIENT)
Dept: PHARMACY | Facility: CLINIC | Age: 24
End: 2019-10-23

## 2019-10-23 NOTE — TELEPHONE ENCOUNTER
Call attempt 1 for Emgality refill and clinical follow up - mailbox not setup; [employer] message sent. Copay $0 at 004.    Herminio Valdovinos, PharmD  Clinical Pharmacist   [facility]   P: [phone]

## 2019-10-31 NOTE — TELEPHONE ENCOUNTER
Call attempt 2 for Emgality refill and clinical follow-up. Unable to leave voicemail. Copay $0 at 004.

## 2019-11-07 NOTE — TELEPHONE ENCOUNTER
Call attempt 3 for Emgality refill and clinical follow-up. Unable to leave voicemail (not setup). Previous [employer] message has not been read. $0.00 copay at 004.     Deven Crisostomo, PharmD  Clinical Pharmacist  [facility]  P: [phone]

## 2019-11-13 ENCOUNTER — TELEPHONE (OUTPATIENT)
Dept: PHARMACY | Facility: CLINIC | Age: 24
End: 2019-11-13

## 2019-11-13 NOTE — TELEPHONE ENCOUNTER
Call attempt #4 for Emgality refill and clinical follow-up. Unable to leave voicemail (not setup). Previous [employer] message has not been read. $0.00 copay at 004  Postcard Sent  InBasket to [facility] sent

## 2020-01-06 RX ORDER — CITALOPRAM 20 MG/1
TABLET, FILM COATED ORAL
Qty: 45 TABLET | Refills: 3 | Status: SHIPPED | OUTPATIENT
Start: 2020-01-06 | End: 2020-04-29

## 2020-03-11 ENCOUNTER — PATIENT MESSAGE (OUTPATIENT)
Dept: NEUROLOGY | Facility: CLINIC | Age: 25
End: 2020-03-11

## 2020-03-27 ENCOUNTER — PATIENT MESSAGE (OUTPATIENT)
Dept: FAMILY MEDICINE | Facility: CLINIC | Age: 25
End: 2020-03-27

## 2020-03-30 ENCOUNTER — PATIENT MESSAGE (OUTPATIENT)
Dept: FAMILY MEDICINE | Facility: CLINIC | Age: 25
End: 2020-03-30

## 2020-04-04 ENCOUNTER — NURSE TRIAGE (OUTPATIENT)
Dept: ADMINISTRATIVE | Facility: CLINIC | Age: 25
End: 2020-04-04

## 2020-04-04 NOTE — TELEPHONE ENCOUNTER
Reason for Disposition   MODERATE difficulty breathing (e.g., speaks in phrases, SOB even at rest, pulse 100-120)    Additional Information   Negative: SEVERE difficulty breathing (e.g., struggling for each breath, speaks in single words)   Negative: Difficult to awaken or acting confused (e.g., disoriented, slurred speech)   Negative: Bluish (or gray) lips or face now   Negative: Shock suspected (e.g., cold/pale/clammy skin, too weak to stand, low BP, rapid pulse)   Negative: Sounds like a life-threatening emergency to the triager   Negative: SEVERE or constant chest pain (Exception: mild central chest pain, present only when coughing)   Negative: [1] COVID-19 suspected (e.g., cough, fever, shortness of breath) AND [2] public health department recommends testing   Negative: [1] COVID-19 exposure AND [2] no symptoms   Negative: COVID-19 and Breastfeeding, questions about    Protocols used: CORONAVIRUS (COVID-19) DIAGNOSED OR [id number]    Pt stated she has been sick for about a week. Pt stated she felt better and went back to work at [employer] yesterday. Pt stated she is still coughing and now has new onset sob while resting. Pt stated she has hx of Asthma. Per triage protocol, advised to go to ED for evaluation and not to drive. Pt verbalized understanding.

## 2020-04-29 RX ORDER — CITALOPRAM 20 MG/1
TABLET, FILM COATED ORAL
Qty: 45 TABLET | Refills: 3 | Status: SHIPPED | OUTPATIENT
Start: 2020-04-29 | End: 2020-11-16 | Stop reason: SDUPTHER

## 2020-05-08 ENCOUNTER — OFFICE VISIT (OUTPATIENT)
Dept: FAMILY MEDICINE | Facility: CLINIC | Age: 25
End: 2020-05-08
Payer: COMMERCIAL

## 2020-05-08 ENCOUNTER — TELEPHONE (OUTPATIENT)
Dept: FAMILY MEDICINE | Facility: CLINIC | Age: 25
End: 2020-05-08

## 2020-05-08 DIAGNOSIS — G47.411 NARCOLEPSY AND CATAPLEXY: Primary | ICD-10-CM

## 2020-05-08 DIAGNOSIS — F33.42 RECURRENT MAJOR DEPRESSIVE DISORDER, IN FULL REMISSION: ICD-10-CM

## 2020-05-08 PROCEDURE — 99213 PR OFFICE/OUTPT VISIT, EST, LEVL III, 20-29 MIN: ICD-10-PCS | Mod: 95,,, | Performed by: NURSE PRACTITIONER

## 2020-05-08 PROCEDURE — 99213 OFFICE O/P EST LOW 20 MIN: CPT | Mod: 95,,, | Performed by: NURSE PRACTITIONER

## 2020-05-08 RX ORDER — LISDEXAMFETAMINE DIMESYLATE 40 MG/1
40 CAPSULE ORAL DAILY
Qty: 30 CAPSULE | Refills: 0 | Status: SHIPPED | OUTPATIENT
Start: 2020-06-08 | End: 2020-06-05 | Stop reason: SDUPTHER

## 2020-05-08 RX ORDER — LISDEXAMFETAMINE DIMESYLATE 40 MG/1
40 CAPSULE ORAL DAILY
Qty: 30 CAPSULE | Refills: 0 | Status: SHIPPED | OUTPATIENT
Start: 2020-07-08 | End: 2020-06-05 | Stop reason: SDUPTHER

## 2020-05-08 RX ORDER — LISDEXAMFETAMINE DIMESYLATE 40 MG/1
40 CAPSULE ORAL DAILY
Qty: 30 CAPSULE | Refills: 0 | Status: SHIPPED | OUTPATIENT
Start: 2020-05-08 | End: 2020-07-06 | Stop reason: SDUPTHER

## 2020-05-08 NOTE — PROGRESS NOTES
The patient location is: home  The chief complaint leading to consultation is: refill medication  Visit type: audiovisual  Total time spent with patient: 15 mins   Each patient to whom he or she provides medical services by telemedicine is:  (1) informed of the relationship between the physician and patient and the respective role of any other health care provider with respect to management of the patient; and (2) notified that he or she may decline to receive medical services by telemedicine and may withdraw from such care at any time.    HPI:  The patient presents today for refill on Vyvanse that she uses for narcolepsy.  She has not been on the medication quite some time and she has been suffering is with worsening of narcolepsy symptoms.      The patient does have a history of major depression and has been stable on Celexa 30 mg daily for quite some time.  She is happy with her moods.    Review of Systems   Constitutional: Negative for activity change and appetite change.   HENT: Negative for congestion, postnasal drip, rhinorrhea and sinus pressure.    Eyes: Negative for pain and redness.   Respiratory: Negative for choking and chest tightness.    Gastrointestinal: Negative for abdominal distention, abdominal pain, blood in stool, constipation, diarrhea, nausea and vomiting.   Endocrine: Negative for polydipsia and polyphagia.   Genitourinary: Negative for dysuria and hematuria.   Musculoskeletal: Negative for arthralgias and myalgias.   Skin: Negative for color change and rash.   Neurological: Negative for dizziness and headaches.   Psychiatric/Behavioral: Negative for agitation and behavioral problems.     Physical Exam   Constitutional: The patient is oriented to person, place, and time. He appears well-developed and well-nourished.   HENT: Head: Normocephalic and atraumatic.  Skin: Skin dry.   Psychiatric: Normal mood and affect.    Assess/Plan:  Diagnoses and all orders for this visit:    Narcolepsy and  cataplexy  -     lisdexamfetamine (VYVANSE) 40 MG Cap; Take 1 capsule (40 mg total) by mouth once daily.  -     lisdexamfetamine (VYVANSE) 40 MG Cap; Take 1 capsule (40 mg total) by mouth once daily.  -     lisdexamfetamine (VYVANSE) 40 MG Cap; Take 1 capsule (40 mg total) by mouth once daily.      Recurrent major depressive disorder, in full remission  Well controlled on Celexa          Answers for HPI/ROS submitted by the patient on 5/8/2020   activity change: No  unexpected weight change: No  neck pain: Yes  hearing loss: No  rhinorrhea: No  trouble swallowing: No  eye discharge: No  visual disturbance: No  chest tightness: No  wheezing: No  chest pain: No  palpitations: No  blood in stool: No  constipation: No  vomiting: No  diarrhea: No  polydipsia: No  polyuria: No  difficulty urinating: No  hematuria: No  menstrual problem: No  dysuria: No  joint swelling: No  arthralgias: No  headaches: Yes  weakness: No  confusion: No  dysphoric mood: Yes

## 2020-06-05 ENCOUNTER — OFFICE VISIT (OUTPATIENT)
Dept: FAMILY MEDICINE | Facility: CLINIC | Age: 25
End: 2020-06-05
Payer: COMMERCIAL

## 2020-06-05 VITALS
BODY MASS INDEX: 38.26 KG/M2 | SYSTOLIC BLOOD PRESSURE: 112 MMHG | WEIGHT: 215.94 LBS | TEMPERATURE: 98 F | HEIGHT: 63 IN | HEART RATE: 76 BPM | DIASTOLIC BLOOD PRESSURE: 74 MMHG | RESPIRATION RATE: 18 BRPM

## 2020-06-05 DIAGNOSIS — F33.0 MAJOR DEPRESSIVE DISORDER, RECURRENT EPISODE, MILD: ICD-10-CM

## 2020-06-05 DIAGNOSIS — J32.9 SINUSITIS, UNSPECIFIED CHRONICITY, UNSPECIFIED LOCATION: Primary | ICD-10-CM

## 2020-06-05 DIAGNOSIS — F32.9 MAJOR DEPRESSIVE DISORDER, REMISSION STATUS UNSPECIFIED, UNSPECIFIED WHETHER RECURRENT: ICD-10-CM

## 2020-06-05 DIAGNOSIS — Z11.3 SCREENING FOR STD (SEXUALLY TRANSMITTED DISEASE): ICD-10-CM

## 2020-06-05 PROCEDURE — 36415 PR COLLECTION VENOUS BLOOD,VENIPUNCTURE: ICD-10-PCS | Mod: S$GLB,,, | Performed by: NURSE PRACTITIONER

## 2020-06-05 PROCEDURE — 99214 OFFICE O/P EST MOD 30 MIN: CPT | Mod: S$GLB,,, | Performed by: NURSE PRACTITIONER

## 2020-06-05 PROCEDURE — 36415 COLL VENOUS BLD VENIPUNCTURE: CPT | Mod: S$GLB,,, | Performed by: NURSE PRACTITIONER

## 2020-06-05 PROCEDURE — 3008F BODY MASS INDEX DOCD: CPT | Mod: CPTII,S$GLB,, | Performed by: NURSE PRACTITIONER

## 2020-06-05 PROCEDURE — 86592 SYPHILIS TEST NON-TREP QUAL: CPT

## 2020-06-05 PROCEDURE — 99214 PR OFFICE/OUTPT VISIT, EST, LEVL IV, 30-39 MIN: ICD-10-PCS | Mod: S$GLB,,, | Performed by: NURSE PRACTITIONER

## 2020-06-05 PROCEDURE — 3008F PR BODY MASS INDEX (BMI) DOCUMENTED: ICD-10-PCS | Mod: CPTII,S$GLB,, | Performed by: NURSE PRACTITIONER

## 2020-06-05 PROCEDURE — 86703 HIV-1/HIV-2 1 RESULT ANTBDY: CPT

## 2020-06-05 PROCEDURE — 87491 CHLMYD TRACH DNA AMP PROBE: CPT

## 2020-06-05 PROCEDURE — 80074 ACUTE HEPATITIS PANEL: CPT

## 2020-06-05 RX ORDER — AMOXICILLIN 875 MG/1
875 TABLET, FILM COATED ORAL EVERY 12 HOURS
Qty: 14 TABLET | Refills: 0 | Status: SHIPPED | OUTPATIENT
Start: 2020-06-05 | End: 2020-06-12

## 2020-06-05 NOTE — PROGRESS NOTES
"Subjective:       Patient ID: Renetta Durham is a 24 y.o. female.    Chief Complaint: Exposure to STD    The patient does tell me she recently had unprotected sex and her partner notified her that he did test positive for chlamydia.  She tells me she has had no signs and symptoms of infection.  No pelvic pain.  No fever or chills.    URI    This is a new problem. The current episode started 1 to 4 weeks ago. The problem has been unchanged. Associated symptoms include congestion, coughing, ear pain, headaches, a plugged ear sensation, rhinorrhea, sinus pain, sneezing and a sore throat. Pertinent negatives include no abdominal pain, chest pain, diarrhea, dysuria, joint pain, joint swelling, nausea or vomiting. She has tried antihistamine, decongestant, acetaminophen, increased fluids and NSAIDs for the symptoms. The treatment provided no relief.     Review of Systems   Constitutional: Positive for appetite change and fatigue.   HENT: Positive for congestion, ear pain, postnasal drip, rhinorrhea, sinus pressure, sinus pain, sneezing and sore throat.    Eyes: Negative for pain and redness.   Respiratory: Positive for cough. Negative for choking, chest tightness and shortness of breath.    Cardiovascular: Negative for chest pain and palpitations.   Gastrointestinal: Negative for abdominal distention, abdominal pain, constipation, diarrhea, nausea and vomiting.   Endocrine: Negative for polydipsia and polyphagia.   Genitourinary: Negative for dysuria and hematuria.   Musculoskeletal: Negative for arthralgias, joint pain, joint swelling and myalgias.   Skin: Negative for color change and pallor.   Neurological: Positive for headaches. Negative for dizziness and light-headedness.       Past medical, surgical, family and social history reviewed.  Objective:     Vitals:    06/05/20 1109   BP: 112/74   Pulse: 76   Resp: 18   Temp: 97.5 °F (36.4 °C)   TempSrc: Oral   Weight: 98 kg (215 lb 15.1 oz)   Height: 5' 3" (1.6 m) "   PainSc:   7   PainLoc: Throat     Body mass index is 38.25 kg/m².     Physical Exam   Constitutional: She is oriented to person, place, and time. She appears well-developed.   Obese female   HENT:   Head: Normocephalic and atraumatic.   Right Ear: Hearing, tympanic membrane, external ear and ear canal normal.   Left Ear: Hearing, tympanic membrane, external ear and ear canal normal.   Nose: Mucosal edema and rhinorrhea present. No nose lacerations or sinus tenderness. Right sinus exhibits maxillary sinus tenderness and frontal sinus tenderness. Left sinus exhibits maxillary sinus tenderness and frontal sinus tenderness.   Mouth/Throat: Uvula is midline and mucous membranes are normal. Posterior oropharyngeal edema and posterior oropharyngeal erythema present.   Eyes: Conjunctivae are normal. Right eye exhibits no discharge. Left eye exhibits no discharge. No scleral icterus.   Neck: Normal range of motion. Neck supple. No tracheal deviation present.   Cardiovascular: Normal rate and regular rhythm.   No murmur heard.  Pulmonary/Chest: Effort normal and breath sounds normal. No stridor. No respiratory distress. She has no wheezes. She has no rales. She exhibits no tenderness.   Musculoskeletal: Normal range of motion.   Lymphadenopathy:     She has cervical adenopathy.   Neurological: She is alert and oriented to person, place, and time.   Skin: Skin is warm and dry.   Psychiatric: She has a normal mood and affect. Her behavior is normal. Judgment and thought content normal.       Assessment:       1. Sinusitis, unspecified chronicity, unspecified location    2. Screening for STD (sexually transmitted disease)    3. Major depressive disorder, remission status unspecified, unspecified whether recurrent        Plan:       Renetta was seen today for exposure to std.    Diagnoses and all orders for this visit:    Sinusitis, unspecified chronicity, unspecified location  -     amoxicillin (AMOXIL) 875 MG tablet; Take 1  tablet (875 mg total) by mouth every 12 (twelve) hours. for 7 days    Screening for STD (sexually transmitted disease)  -     RPR  -     Hepatitis Panel, Acute  -     HIV 1/2 Ag/Ab (4th Gen)  -     C. trachomatis/N. gonorrhoeae by AMP DNA [facility]; Urine    Major depressive disorder, remission status unspecified, unspecified whether recurrent    Stable on Celexa.  She is happy with her moods.  No suicidal or homicidal ideations.

## 2020-06-05 NOTE — PROGRESS NOTES
Venipuncture performed with 23 gauge butterfly, x's 1 attempt,  to L Antecubital vein.  Specimens collected per orders.      Pressure dressing applied to site, instructed patient to remove dressing in 10-15 minutes, OK to re-adjust dressing if pressure causing any discomfort, to observe closely for numbness and/or discoloration to hand or fingers, and to notify provider if bleeding persists after applying constant pressure lasting 30 minutes.

## 2020-06-06 LAB — RPR SER QL: NORMAL

## 2020-06-08 LAB
HAV IGM SERPL QL IA: NEGATIVE
HBV CORE IGM SERPL QL IA: NEGATIVE
HBV SURFACE AG SERPL QL IA: NEGATIVE
HCV AB SERPL QL IA: NEGATIVE
HIV 1+2 AB+HIV1 P24 AG SERPL QL IA: NEGATIVE

## 2020-06-09 ENCOUNTER — TELEPHONE (OUTPATIENT)
Dept: FAMILY MEDICINE | Facility: CLINIC | Age: 25
End: 2020-06-09

## 2020-06-09 LAB
C TRACH DNA SPEC QL NAA+PROBE: NOT DETECTED
N GONORRHOEA DNA SPEC QL NAA+PROBE: NOT DETECTED

## 2020-06-09 NOTE — TELEPHONE ENCOUNTER
LM to notify patient of negative results. Included contact information for return call to clinic with any additional questions/concerns.

## 2020-06-09 NOTE — TELEPHONE ENCOUNTER
----- Message from Mandi Barakat sent at 6/8/2020  6:18 PM CDT -----  Contact: Patient   Type:  Test Results    Who Called:  Renetta   Name of Test (Lab/Mammo/Etc):  Lab and urine test   Date of Test:  06/05/20  Ordering Provider:     Where the test was performed:  [facility]   Would the patient rather a call back or a response via [facility]?  Call back   Best Call Back Number:  [phone]   Additional Information:   No

## 2020-06-23 ENCOUNTER — OFFICE VISIT (OUTPATIENT)
Dept: FAMILY MEDICINE | Facility: CLINIC | Age: 25
End: 2020-06-23
Payer: COMMERCIAL

## 2020-06-23 VITALS
SYSTOLIC BLOOD PRESSURE: 132 MMHG | HEIGHT: 63 IN | HEART RATE: 85 BPM | RESPIRATION RATE: 16 BRPM | BODY MASS INDEX: 38.64 KG/M2 | TEMPERATURE: 98 F | DIASTOLIC BLOOD PRESSURE: 76 MMHG | WEIGHT: 218.06 LBS | OXYGEN SATURATION: 96 %

## 2020-06-23 DIAGNOSIS — M25.519 SHOULDER PAIN, UNSPECIFIED CHRONICITY, UNSPECIFIED LATERALITY: Primary | ICD-10-CM

## 2020-06-23 DIAGNOSIS — M54.9 BACK PAIN, UNSPECIFIED BACK LOCATION, UNSPECIFIED BACK PAIN LATERALITY, UNSPECIFIED CHRONICITY: ICD-10-CM

## 2020-06-23 PROCEDURE — 3008F PR BODY MASS INDEX (BMI) DOCUMENTED: ICD-10-PCS | Mod: CPTII,S$GLB,, | Performed by: NURSE PRACTITIONER

## 2020-06-23 PROCEDURE — 99214 PR OFFICE/OUTPT VISIT, EST, LEVL IV, 30-39 MIN: ICD-10-PCS | Mod: S$GLB,,, | Performed by: NURSE PRACTITIONER

## 2020-06-23 PROCEDURE — 3008F BODY MASS INDEX DOCD: CPT | Mod: CPTII,S$GLB,, | Performed by: NURSE PRACTITIONER

## 2020-06-23 PROCEDURE — 99214 OFFICE O/P EST MOD 30 MIN: CPT | Mod: S$GLB,,, | Performed by: NURSE PRACTITIONER

## 2020-06-23 RX ORDER — MELOXICAM 15 MG/1
15 TABLET ORAL DAILY
Qty: 10 TABLET | Refills: 0 | Status: SHIPPED | OUTPATIENT
Start: 2020-06-23 | End: 2021-11-22

## 2020-06-23 NOTE — PROGRESS NOTES
"Subjective:       Patient ID: Renetta Durham is a 24 y.o. female.    Chief Complaint: Shoulder Pain (left ) and Back Pain    The patient is here with consistent shoulder pain bilaterally as well as upper back pain.  She has had this for quite some time without any relief.  She has used Tylenol and OTC ibuprofen with little relief.  The she does not have any problems sleeping at night and no numbness or tingling to the hands bilaterally.  No injury to the areas.    Review of Systems   Constitutional: Negative for activity change and appetite change.   HENT: Negative for congestion, postnasal drip, rhinorrhea and sinus pressure.    Eyes: Negative for pain and redness.   Respiratory: Negative for choking and chest tightness.    Gastrointestinal: Negative for abdominal distention, abdominal pain, blood in stool, constipation, diarrhea, nausea and vomiting.   Endocrine: Negative for polydipsia and polyphagia.   Genitourinary: Negative for dysuria and hematuria.   Musculoskeletal: Positive for arthralgias and back pain. Negative for myalgias.   Skin: Negative for color change and rash.   Neurological: Negative for dizziness and headaches.   Psychiatric/Behavioral: Negative for agitation and behavioral problems.       Past medical, surgical, family and social history reviewed.  Objective:     Vitals:    06/23/20 1607   BP: 132/76   Pulse: 85   Resp: 16   Temp: 97.8 °F (36.6 °C)   TempSrc: Oral   SpO2: 96%   Weight: 98.9 kg (218 lb 0.6 oz)   Height: 5' 3" (1.6 m)   PainSc:   6   PainLoc: Back     Body mass index is 38.62 kg/m².     Physical Exam  Constitutional:       General: She is not in acute distress.     Appearance: She is well-developed. She is not diaphoretic.   HENT:      Head: Normocephalic and atraumatic.      Right Ear: Hearing, tympanic membrane, ear canal and external ear normal.      Left Ear: Hearing, tympanic membrane, ear canal and external ear normal.      Nose: Nose normal.      Mouth/Throat:      " Pharynx: Uvula midline.   Eyes:      General:         Right eye: No discharge.         Left eye: No discharge.      Conjunctiva/sclera: Conjunctivae normal.      Pupils: Pupils are equal, round, and reactive to light.   Neck:      Musculoskeletal: Normal range of motion and neck supple.      Thyroid: No thyromegaly.      Vascular: No carotid bruit or JVD.      Trachea: Trachea normal.   Cardiovascular:      Rate and Rhythm: Normal rate and regular rhythm.      Heart sounds: No murmur. No friction rub. No gallop.    Pulmonary:      Effort: Pulmonary effort is normal. No respiratory distress.      Breath sounds: Normal breath sounds. No wheezing or rales.   Chest:      Chest wall: No tenderness.   Abdominal:      General: Bowel sounds are normal. There is no distension.      Palpations: Abdomen is soft. There is no mass.      Tenderness: There is no abdominal tenderness. There is no guarding or rebound.   Musculoskeletal: Normal range of motion.   Skin:     General: Skin is warm and dry.   Neurological:      Mental Status: She is alert and oriented to person, place, and time.      Coordination: Coordination normal.   Psychiatric:         Behavior: Behavior normal.         Thought Content: Thought content normal.         Judgment: Judgment normal.         Assessment:       1. Shoulder pain, unspecified chronicity, unspecified laterality    2. Back pain, unspecified back location, unspecified back pain laterality, unspecified chronicity        Plan:       Renetta was seen today for chest pain, shoulder pain and back pain.    Diagnoses and all orders for this visit:    Shoulder pain, unspecified chronicity, unspecified laterality  -     X-Ray Shoulder Complete Bilateral; Future  -     meloxicam (MOBIC) 15 MG tablet; Take 1 tablet (15 mg total) by mouth once daily.  -     Ambulatory referral/consult to Physical/Occupational Therapy; Future    Back pain, unspecified back location, unspecified back pain laterality,  unspecified chronicity  -     X-Ray Thoracic Spine AP Lateral; Future  -     meloxicam (MOBIC) 15 MG tablet; Take 1 tablet (15 mg total) by mouth once daily.

## 2020-06-24 ENCOUNTER — HOSPITAL ENCOUNTER (OUTPATIENT)
Dept: RADIOLOGY | Facility: HOSPITAL | Age: 25
Discharge: HOME | End: 2020-06-24
Attending: NURSE PRACTITIONER
Payer: COMMERCIAL

## 2020-06-24 DIAGNOSIS — M54.9 BACK PAIN, UNSPECIFIED BACK LOCATION, UNSPECIFIED BACK PAIN LATERALITY, UNSPECIFIED CHRONICITY: ICD-10-CM

## 2020-06-24 DIAGNOSIS — M25.519 SHOULDER PAIN, UNSPECIFIED CHRONICITY, UNSPECIFIED LATERALITY: ICD-10-CM

## 2020-06-24 PROCEDURE — 73030 XR SHOULDER COMPLETE 2 OR MORE VIEWS BILATERAL: ICD-10-PCS | Mod: 26,LT,, | Performed by: RADIOLOGY

## 2020-06-24 PROCEDURE — 73030 X-RAY EXAM OF SHOULDER: CPT | Mod: 26,LT,, | Performed by: RADIOLOGY

## 2020-06-24 PROCEDURE — 72070 X-RAY EXAM THORAC SPINE 2VWS: CPT | Mod: TC,FY,PO

## 2020-06-24 PROCEDURE — 72070 X-RAY EXAM THORAC SPINE 2VWS: CPT | Mod: 26,,, | Performed by: RADIOLOGY

## 2020-06-24 PROCEDURE — 73030 X-RAY EXAM OF SHOULDER: CPT | Mod: 26,RT,, | Performed by: RADIOLOGY

## 2020-06-24 PROCEDURE — 72070 XR THORACIC SPINE AP LATERAL: ICD-10-PCS | Mod: 26,,, | Performed by: RADIOLOGY

## 2020-06-24 PROCEDURE — 73030 X-RAY EXAM OF SHOULDER: CPT | Mod: TC,50,FY,PO

## 2020-07-06 RX ORDER — LISDEXAMFETAMINE DIMESYLATE 40 MG/1
40 CAPSULE ORAL DAILY
Qty: 30 CAPSULE | Refills: 0 | Status: SHIPPED | OUTPATIENT
Start: 2020-07-06 | End: 2020-08-12 | Stop reason: SDUPTHER

## 2020-07-06 NOTE — TELEPHONE ENCOUNTER
----- Message from Bria Garcia sent at 7/6/2020  1:39 PM CDT -----  Contact: Patient  Type:  RX Refill Request    Who Called:  Patient  Refill or New Rx: Refill  RX Name and Strength:  lisdexamfetamine (VYVANSE) 40 MG Cap  How is the patient currently taking it? (ex. 1XDay):  Take 1 capsule (40 mg total) by mouth once daily. - Oral  Is this a 30 day or 90 day RX:  30 capsule  Preferred Pharmacy with phone number:    [id number] - [place], [place] - [address]  [address]  [facility] [address]  Phone: [phone] Fax: [phone]  Local or Mail Order:  local  Ordering Provider:  Rhonda Obando NP  Best Call Back Number:  [phone]

## 2020-07-09 ENCOUNTER — OFFICE VISIT (OUTPATIENT)
Dept: FAMILY MEDICINE | Facility: CLINIC | Age: 25
End: 2020-07-09
Payer: COMMERCIAL

## 2020-07-09 VITALS
BODY MASS INDEX: 37.77 KG/M2 | RESPIRATION RATE: 16 BRPM | OXYGEN SATURATION: 97 % | DIASTOLIC BLOOD PRESSURE: 78 MMHG | HEIGHT: 63 IN | SYSTOLIC BLOOD PRESSURE: 118 MMHG | WEIGHT: 213.19 LBS | TEMPERATURE: 99 F | HEART RATE: 83 BPM

## 2020-07-09 DIAGNOSIS — Z11.51 SCREENING FOR HUMAN PAPILLOMAVIRUS (HPV): ICD-10-CM

## 2020-07-09 DIAGNOSIS — Z01.419 ROUTINE GYNECOLOGICAL EXAMINATION: Primary | ICD-10-CM

## 2020-07-09 DIAGNOSIS — Z12.4 CERVICAL CANCER SCREENING: ICD-10-CM

## 2020-07-09 DIAGNOSIS — Q79.69 EHLERS-DANLOS SYNDROME, FAMILIAL JOINT LAXITY TYPE: ICD-10-CM

## 2020-07-09 DIAGNOSIS — G47.411 PRIMARY NARCOLEPSY WITH CATAPLEXY: ICD-10-CM

## 2020-07-09 PROCEDURE — 99395 PREV VISIT EST AGE 18-39: CPT | Mod: S$GLB,,, | Performed by: NURSE PRACTITIONER

## 2020-07-09 PROCEDURE — 87624 HPV HI-RISK TYP POOLED RSLT: CPT

## 2020-07-09 PROCEDURE — 99395 PR PREVENTIVE VISIT,EST,18-39: ICD-10-PCS | Mod: S$GLB,,, | Performed by: NURSE PRACTITIONER

## 2020-07-09 PROCEDURE — 88175 CYTOPATH C/V AUTO FLUID REDO: CPT

## 2020-07-09 NOTE — PROGRESS NOTES
"Subjective:       Patient ID: Renetta Durham is a 25 y.o. female.    Chief Complaint: Follow-up and Gynecologic Exam    The patient presents today for well-woman exam.  She is sexually active.  She request a Pap today just to check everything.  Last Pap smear 9/25/18 and normal.      Review of Systems   Constitutional: Negative for activity change and appetite change.   HENT: Negative for congestion, postnasal drip, rhinorrhea and sinus pressure.    Eyes: Negative for pain and redness.   Respiratory: Negative for choking and chest tightness.    Gastrointestinal: Negative for abdominal distention, abdominal pain, blood in stool, constipation, diarrhea, nausea and vomiting.   Endocrine: Negative for polydipsia and polyphagia.   Genitourinary: Negative for dysuria and hematuria.   Musculoskeletal: Negative for arthralgias and myalgias.   Skin: Negative for color change and rash.   Neurological: Negative for dizziness and headaches.   Psychiatric/Behavioral: Negative for agitation and behavioral problems.       Past medical, surgical, family and social history reviewed.  Objective:     Vitals:    07/09/20 1234   BP: 118/78   Pulse: 83   Resp: 16   Temp: 98.5 °F (36.9 °C)   TempSrc: Temporal   SpO2: 97%   Weight: 96.7 kg (213 lb 3 oz)   Height: 5' 3" (1.6 m)   PainSc:   7     Body mass index is 37.76 kg/m².     Physical Exam  Constitutional:       General: She is not in acute distress.     Appearance: She is well-developed. She is obese. She is not diaphoretic.   HENT:      Head: Normocephalic and atraumatic.      Right Ear: Hearing, tympanic membrane, ear canal and external ear normal.      Left Ear: Hearing, tympanic membrane, ear canal and external ear normal.      Nose: Nose normal.      Mouth/Throat:      Pharynx: Uvula midline.   Eyes:      General:         Right eye: No discharge.         Left eye: No discharge.      Conjunctiva/sclera: Conjunctivae normal.      Pupils: Pupils are equal, round, and reactive to " light.   Neck:      Musculoskeletal: Normal range of motion and neck supple.      Thyroid: No thyromegaly.      Vascular: No carotid bruit or JVD.      Trachea: Trachea normal.   Cardiovascular:      Rate and Rhythm: Normal rate and regular rhythm.      Heart sounds: No murmur. No friction rub. No gallop.    Pulmonary:      Effort: Pulmonary effort is normal. No respiratory distress.      Breath sounds: Normal breath sounds. No wheezing or rales.   Chest:      Chest wall: No tenderness.      Breasts:         Right: No inverted nipple, mass, nipple discharge, skin change or tenderness.         Left: No inverted nipple, mass, nipple discharge, skin change or tenderness.   Abdominal:      General: Bowel sounds are normal. There is no distension.      Palpations: Abdomen is soft. There is no mass.      Tenderness: There is no abdominal tenderness. There is no guarding or rebound.   Genitourinary:     Exam position: Supine.      Labia:         Right: No rash, tenderness, lesion or injury.         Left: No rash, tenderness, lesion or injury.       Vagina: Normal.      Rectum: Normal.      Comments: Cervix moist and pink. Pap obtained, patient tolerated well   Musculoskeletal: Normal range of motion.   Skin:     General: Skin is warm and dry.   Neurological:      Mental Status: She is alert and oriented to person, place, and time.      Coordination: Coordination normal.   Psychiatric:         Behavior: Behavior normal.         Thought Content: Thought content normal.         Judgment: Judgment normal.         Assessment:       1. Routine gynecological examination    2. Screening for human papillomavirus (HPV)    3. Cervical cancer screening    4. Primary narcolepsy with cataplexy    5. Denzel-Danlos syndrome, familial joint laxity type        Plan:       Renetta was seen today for follow-up and gynecologic exam.    Diagnoses and all orders for this visit:    Routine gynecological examination    Screening for human  papillomavirus (HPV)  -     HPV High Risk Genotypes, PCR    Cervical cancer screening  -     Liquid-Based Pap Smear, Screening    Primary narcolepsy with cataplexy  -     Ambulatory referral/consult to Sleep Disorders; Future    Denzel-Danlos syndrome, familial joint laxity type--patient's sister has Lexus Danlos syndrome and she would like a referral to genetics  -     Ambulatory referral/consult to Genetics; Future

## 2020-07-16 LAB
HPV HR 12 DNA SPEC QL NAA+PROBE: NEGATIVE
HPV16 AG SPEC QL: NEGATIVE
HPV18 DNA SPEC QL NAA+PROBE: NEGATIVE

## 2020-07-17 LAB
FINAL PATHOLOGIC DIAGNOSIS: NORMAL
Lab: NORMAL

## 2020-08-02 ENCOUNTER — PATIENT MESSAGE (OUTPATIENT)
Dept: FAMILY MEDICINE | Facility: CLINIC | Age: 25
End: 2020-08-02

## 2020-08-02 RX ORDER — METRONIDAZOLE 500 MG/1
500 TABLET ORAL EVERY 8 HOURS
Qty: 21 TABLET | Refills: 0 | Status: SHIPPED | OUTPATIENT
Start: 2020-08-02 | End: 2020-08-09

## 2020-08-12 RX ORDER — LISDEXAMFETAMINE DIMESYLATE 40 MG/1
40 CAPSULE ORAL DAILY
Qty: 30 CAPSULE | Refills: 0 | Status: SHIPPED | OUTPATIENT
Start: 2020-08-12 | End: 2020-10-01 | Stop reason: SDUPTHER

## 2020-08-12 NOTE — TELEPHONE ENCOUNTER
----- Message from Bria Garcia sent at 8/12/2020  2:03 PM CDT -----  Type:  RX Refill Request    Who Called:  Patient  Refill or New Rx:  Refill  RX Name and Strength:  lisdexamfetamine (VYVANSE) 40 MG Cap  How is the patient currently taking it? (ex. 1XDay):  Take 1 capsule (40 mg total) by mouth once daily. - Oral  Is this a 30 day or 90 day RX:  30 capsule  Preferred Pharmacy with phone number:    [id number] - [place], [place] - [address]  [address] [address]  Phone: [phone] Fax: [phone]  Local or Mail Order:  local  Ordering Provider:  Rhonda Obando NP  Best Call Back Number:  [phone]

## 2020-08-18 ENCOUNTER — OFFICE VISIT (OUTPATIENT)
Dept: URGENT CARE | Facility: CLINIC | Age: 25
End: 2020-08-18
Payer: COMMERCIAL

## 2020-08-18 VITALS
HEART RATE: 94 BPM | OXYGEN SATURATION: 99 % | WEIGHT: 213 LBS | BODY MASS INDEX: 37.74 KG/M2 | DIASTOLIC BLOOD PRESSURE: 98 MMHG | HEIGHT: 63 IN | SYSTOLIC BLOOD PRESSURE: 143 MMHG | RESPIRATION RATE: 16 BRPM | TEMPERATURE: 97 F

## 2020-08-18 DIAGNOSIS — R21 RASH: Primary | ICD-10-CM

## 2020-08-18 PROCEDURE — 99214 OFFICE O/P EST MOD 30 MIN: CPT | Mod: S$GLB,,, | Performed by: PHYSICIAN ASSISTANT

## 2020-08-18 PROCEDURE — 99214 PR OFFICE/OUTPT VISIT, EST, LEVL IV, 30-39 MIN: ICD-10-PCS | Mod: S$GLB,,, | Performed by: PHYSICIAN ASSISTANT

## 2020-08-18 RX ORDER — HYDROCORTISONE 25 MG/G
CREAM TOPICAL 2 TIMES DAILY
Qty: 28 G | Refills: 0 | Status: SHIPPED | OUTPATIENT
Start: 2020-08-18 | End: 2020-11-18

## 2020-08-18 RX ORDER — TRIAMCINOLONE ACETONIDE 1 MG/G
CREAM TOPICAL
Qty: 1 TUBE | Refills: 0 | Status: SHIPPED | OUTPATIENT
Start: 2020-08-18 | End: 2021-08-12

## 2020-08-18 NOTE — PROGRESS NOTES
"Subjective:       Patient ID: Renetta Durham is a 25 y.o. female.    Vitals:  height is 5' 3" (1.6 m) and weight is 96.6 kg (213 lb). Her oral temperature is 97.4 °F (36.3 °C). Her blood pressure is 143/98 (abnormal) and her pulse is 94. Her respiration is 16 and oxygen saturation is 99%.     Chief Complaint: Rash    Patient presents to urgent care with rash to bilateral arms/legs x 5 days. Patient reports that she recently finished Flagyl RX, but denies trying new food/drink/soaps/detergents, etc. Patient currently denies fever, chills, body aches, active CP, SOB, wheezing, trouble swallowing, abdominal pain, N/V, headache or blurry vision. Patient has not tried anything OTC for treatment prior to arrival.    Rash  This is a new problem. The current episode started in the past 7 days. The problem is unchanged. The affected locations include the right upper leg, right lower leg, left upper leg, left lower leg, left arm and right arm. The rash is characterized by redness. It is unknown if there was an exposure to a precipitant. Pertinent negatives include no anorexia, congestion, cough, diarrhea, eye pain, facial edema, fatigue, fever, joint pain, nail changes, rhinorrhea, shortness of breath, sore throat or vomiting. Past treatments include nothing. Her past medical history is significant for asthma. There is no history of allergies, eczema or varicella.       Constitution: Negative for chills, sweating, fatigue and fever.   HENT: Negative for ear pain, drooling, facial swelling, congestion, sore throat, trouble swallowing and voice change.    Neck: Negative for neck pain, neck stiffness, painful lymph nodes and neck swelling.   Cardiovascular: Negative for chest pain, leg swelling, palpitations, sob on exertion and passing out.   Eyes: Negative for eye itching, eye pain, eye redness, photophobia, double vision, blurred vision and eyelid swelling.   Respiratory: Positive for asthma. Negative for chest tightness, " cough, sputum production, bloody sputum, shortness of breath, stridor and wheezing.    Gastrointestinal: Negative for abdominal pain, abdominal bloating, nausea, vomiting, constipation, diarrhea and heartburn.   Musculoskeletal: Negative for joint pain, joint swelling, abnormal ROM of joint, back pain, muscle cramps and muscle ache.   Skin: Positive for rash and erythema. Negative for wound, abrasion, laceration, puncture wound, bruising, abscess and hives.   Allergic/Immunologic: Positive for asthma. Negative for environmental allergies, hives, itching and sneezing.   Neurological: Negative for dizziness, light-headedness, passing out, loss of balance, headaches, altered mental status, loss of consciousness and seizures.   Hematologic/Lymphatic: Negative for swollen lymph nodes.   Psychiatric/Behavioral: Negative for altered mental status and nervous/anxious. The patient is not nervous/anxious.        Objective:      Physical Exam   Constitutional: She is oriented to person, place, and time. She appears well-developed. She is cooperative.  Non-toxic appearance. She does not appear ill. No distress.   HENT:   Head: Normocephalic and atraumatic.   Ears:   Right Ear: Hearing, tympanic membrane, external ear and ear canal normal.   Left Ear: Hearing, tympanic membrane, external ear and ear canal normal.   Nose: Nose normal. No mucosal edema, rhinorrhea or nasal deformity. No epistaxis. Right sinus exhibits no maxillary sinus tenderness and no frontal sinus tenderness. Left sinus exhibits no maxillary sinus tenderness and no frontal sinus tenderness.   Mouth/Throat: Uvula is midline, oropharynx is clear and moist and mucous membranes are normal. No trismus in the jaw. Normal dentition. No uvula swelling. No oropharyngeal exudate, posterior oropharyngeal edema or posterior oropharyngeal erythema.   Eyes: Conjunctivae and lids are normal. No scleral icterus.   Neck: Trachea normal, full passive range of motion without  pain and phonation normal. Neck supple. No neck rigidity. No edema and no erythema present.   Cardiovascular: Normal rate, regular rhythm, normal heart sounds and normal pulses.   Pulmonary/Chest: Effort normal and breath sounds normal. No accessory muscle usage or stridor. No respiratory distress. She has no decreased breath sounds. She has no wheezes. She has no rhonchi. She has no rales.   Abdominal: Normal appearance.   Musculoskeletal: Normal range of motion.         General: No deformity.   Lymphadenopathy:     She has no cervical adenopathy.   Neurological: She is alert and oriented to person, place, and time. She exhibits normal muscle tone. Coordination normal.   Skin: Skin is warm, dry, intact, not diaphoretic, not pale, rash (Small raised erythematous lesions scattered to bilateral upper/lower extremities. No vesicles or open blisters. No induration, fluctuance, warmth or TTP. No drainage, active bleeding or red streaks.), not vesicular and no abscessed. Capillary refill takes less than 2 seconds. Lesions:  erythema and lesionabrasion, burn, bruising, ecchymosis and petechiaePsychiatric: Her speech is normal and behavior is normal. Judgment and thought content normal.   Nursing note and vitals reviewed.          Assessment:       1. Rash        Plan:         Rash  -     hydrocortisone 2.5 % cream; Apply topically 2 (two) times daily.  Dispense: 28 g; Refill: 0  -     triamcinolone acetonide 0.1% (KENALOG) 0.1 % cream; Apply to wound twice a day  Dispense: 1 Tube; Refill: 0      Patient Instructions     You must understand that you've received an Urgent Care treatment only and that you may be released before all your medical problems are known or treated. You, the patient, will arrange for follow up care as instructed.  Follow up with your PCP or specialty clinic as directed if not improved or as needed. You can call [phone] to schedule an appointment with the appropriate provider.  If your condition  worsens we recommend that you receive another evaluation at the Emergency Department for any concerns or worsening of condition.  Patient aware and verbalized understanding.    Drink plenty of fluids and get lots of rest.  Avoid picking/manipulating the wound.   Keep dry and clean after shower/bath.  Cover during the day to avoid friction constantly rubbing up against the area of irritation.   Kenalog and Hydrocortisone RX as prescribed for rash as needed.  DO NOT TRY NEW SOAPS, DETERGENTS, FOOD/DRINK, ETC.  Follow-up with your PCP and/or Dermatology for further evaluation as needed.  You should go to the nearest ER treatment if fever, increase pain, swelling, red streaks from affected area or other signs of increasing infection.   ER precautions given to patient.  Patient aware and verbalized understanding.    Self-Care for Skin Rashes     Pat your skin dry. Do not rub.     When your skin reacts to a substance your body is sensitive to, it can cause a rash. You can treat most rashes at home by keeping the skin clean and dry. Many rashes may get better on their own within 2 to 3 days. You may need medical attention if your rash itches, drains, or hurts, particularly if the rash is getting worse.  What can cause a skin rash?  · Sun poisoning, caused by too much exposure to the sun  · An irritant or allergic reaction to a certain type of food, plant, or chemical, such as  shellfish, poison ivy, and or cleaning products  · An infection caused by a fungus (ringworm), virus (chickenpox), or bacteria (strep)  · Bites or infestation caused by insects or pests, such as ticks, lice, or mites  · Dry skin, which is often seen during the winter months and in older people  How can I control itching and skin damage?  · Take soothing lukewarm baths in a colloidal oatmeal product. You can buy this at the [employer].  · Do your best not to scratch. Clip fingernails short, especially in young children, to reduce skin damage if  scratching does occur.  · Use moisturizing skin lotion instead of scratching your dry skin.  · Use sunscreen whenever going out into direct sun.  · Use only mild cleansing agents whenever possible.  · Wash with mild, nonirritating soap and warm water.  · Wear clothing that breathes, such as cotton shirts or canvas shoes.  · If fluid is seeping from the rash, cover it loosely with clean gauze to absorb the discharge.  · Many rashes are contagious. Prevent the rash from spreading to others by washing your hands often before or after touching others with any skin rash.  Use medicine  · Antihistamines such as diphenhydramine can help control itching. But use with caution because they can make you drowsy.  · Using over-the-counter hydrocortisone cream on small rashes may help reduce swelling and itching  · Most over-the-counter antifungal medicines can treat athletes foot and many other fungal infections of the skin.  Check with your healthcare provider  Call your healthcare provider if:  · You were told that you have a fungal infection on your skin to make sure you have the correct type of medicine.  · You have questions or concerns about medicines or their side effects.     Call 911  Call 911 if either of these occur:  · Your tongue or lips start to swell  · You have difficulty breathing      Call your healthcare provider  Call your healthcare provider if any of these occur:  · Temperature of more than 101.0°F (38.3°C), or as directed  · Sore throat, a cough, or unusual fatigue  · Red, oozy, or painful rash gets worse. These are signs of infection.  · Rash covers your face, genitals, or most of your body  · Crusty sores or red rings that begin to spread  · You were exposed to someone who has a contagious rash, such as scabies or lice.  · Red bulls-eye rash with a white center (a sign of Lyme disease)  · You were told that you have resistant bacteria (MRSA) on your skin.   Date Last Reviewed: 5/12/2015  © 6633-5833  [employer]. [address] [place] [address], [place] [address]. All rights reserved. This information is not intended as a substitute for professional medical care. Always follow your healthcare professional's instructions.

## 2020-08-19 NOTE — PATIENT INSTRUCTIONS
You must understand that you've received an Urgent Care treatment only and that you may be released before all your medical problems are known or treated. You, the patient, will arrange for follow up care as instructed.  Follow up with your PCP or specialty clinic as directed if not improved or as needed. You can call [phone] to schedule an appointment with the appropriate provider.  If your condition worsens we recommend that you receive another evaluation at the Emergency Department for any concerns or worsening of condition.  Patient aware and verbalized understanding.    Drink plenty of fluids and get lots of rest.  Avoid picking/manipulating the wound.   Keep dry and clean after shower/bath.  Cover during the day to avoid friction constantly rubbing up against the area of irritation.   Kenalog and Hydrocortisone RX as prescribed for rash as needed.  DO NOT TRY NEW SOAPS, DETERGENTS, FOOD/DRINK, ETC.  Follow-up with your PCP and/or Dermatology for further evaluation as needed.  You should go to the nearest ER treatment if fever, increase pain, swelling, red streaks from affected area or other signs of increasing infection.   ER precautions given to patient.  Patient aware and verbalized understanding.    Self-Care for Skin Rashes     Pat your skin dry. Do not rub.     When your skin reacts to a substance your body is sensitive to, it can cause a rash. You can treat most rashes at home by keeping the skin clean and dry. Many rashes may get better on their own within 2 to 3 days. You may need medical attention if your rash itches, drains, or hurts, particularly if the rash is getting worse.  What can cause a skin rash?  · Sun poisoning, caused by too much exposure to the sun  · An irritant or allergic reaction to a certain type of food, plant, or chemical, such as  shellfish, poison ivy, and or cleaning products  · An infection caused by a fungus (ringworm), virus (chickenpox), or bacteria (strep)  · Bites or  infestation caused by insects or pests, such as ticks, lice, or mites  · Dry skin, which is often seen during the winter months and in older people  How can I control itching and skin damage?  · Take soothing lukewarm baths in a colloidal oatmeal product. You can buy this at the [employer].  · Do your best not to scratch. Clip fingernails short, especially in young children, to reduce skin damage if scratching does occur.  · Use moisturizing skin lotion instead of scratching your dry skin.  · Use sunscreen whenever going out into direct sun.  · Use only mild cleansing agents whenever possible.  · Wash with mild, nonirritating soap and warm water.  · Wear clothing that breathes, such as cotton shirts or canvas shoes.  · If fluid is seeping from the rash, cover it loosely with clean gauze to absorb the discharge.  · Many rashes are contagious. Prevent the rash from spreading to others by washing your hands often before or after touching others with any skin rash.  Use medicine  · Antihistamines such as diphenhydramine can help control itching. But use with caution because they can make you drowsy.  · Using over-the-counter hydrocortisone cream on small rashes may help reduce swelling and itching  · Most over-the-counter antifungal medicines can treat athletes foot and many other fungal infections of the skin.  Check with your healthcare provider  Call your healthcare provider if:  · You were told that you have a fungal infection on your skin to make sure you have the correct type of medicine.  · You have questions or concerns about medicines or their side effects.     Call 911  Call 911 if either of these occur:  · Your tongue or lips start to swell  · You have difficulty breathing      Call your healthcare provider  Call your healthcare provider if any of these occur:  · Temperature of more than 101.0°F (38.3°C), or as directed  · Sore throat, a cough, or unusual fatigue  · Red, oozy, or painful rash gets worse.  These are signs of infection.  · Rash covers your face, genitals, or most of your body  · Crusty sores or red rings that begin to spread  · You were exposed to someone who has a contagious rash, such as scabies or lice.  · Red bulls-eye rash with a white center (a sign of Lyme disease)  · You were told that you have resistant bacteria (MRSA) on your skin.   Date Last Reviewed: 5/12/2015  © 1410-6776 [employer]. [address] [place] [address], [place] [address]. All rights reserved. This information is not intended as a substitute for professional medical care. Always follow your healthcare professional's instructions.

## 2020-09-14 ENCOUNTER — HOSPITAL ENCOUNTER (OUTPATIENT)
Dept: RADIOLOGY | Facility: HOSPITAL | Age: 25
Discharge: HOME | End: 2020-09-14
Attending: NURSE PRACTITIONER
Payer: COMMERCIAL

## 2020-09-14 ENCOUNTER — OFFICE VISIT (OUTPATIENT)
Dept: FAMILY MEDICINE | Facility: CLINIC | Age: 25
End: 2020-09-14
Payer: COMMERCIAL

## 2020-09-14 VITALS
RESPIRATION RATE: 18 BRPM | WEIGHT: 210.19 LBS | HEIGHT: 63 IN | HEART RATE: 88 BPM | BODY MASS INDEX: 37.24 KG/M2 | SYSTOLIC BLOOD PRESSURE: 112 MMHG | DIASTOLIC BLOOD PRESSURE: 78 MMHG | TEMPERATURE: 98 F

## 2020-09-14 DIAGNOSIS — M79.89 FOOT SWELLING: ICD-10-CM

## 2020-09-14 DIAGNOSIS — R06.09 DOE (DYSPNEA ON EXERTION): ICD-10-CM

## 2020-09-14 DIAGNOSIS — Z00.00 LABORATORY EXAM ORDERED AS PART OF ROUTINE GENERAL MEDICAL EXAMINATION: ICD-10-CM

## 2020-09-14 DIAGNOSIS — R00.2 PALPITATIONS: Primary | ICD-10-CM

## 2020-09-14 DIAGNOSIS — R53.83 FATIGUE, UNSPECIFIED TYPE: ICD-10-CM

## 2020-09-14 PROCEDURE — 3008F BODY MASS INDEX DOCD: CPT | Mod: CPTII,S$GLB,, | Performed by: NURSE PRACTITIONER

## 2020-09-14 PROCEDURE — 71046 X-RAY EXAM CHEST 2 VIEWS: CPT | Mod: 26,,, | Performed by: RADIOLOGY

## 2020-09-14 PROCEDURE — 3008F PR BODY MASS INDEX (BMI) DOCUMENTED: ICD-10-PCS | Mod: CPTII,S$GLB,, | Performed by: NURSE PRACTITIONER

## 2020-09-14 PROCEDURE — 90686 FLU VACCINE (QUAD) GREATER THAN OR EQUAL TO 3YO PRESERVATIVE FREE IM: ICD-10-PCS | Mod: S$GLB,,, | Performed by: NURSE PRACTITIONER

## 2020-09-14 PROCEDURE — 90686 IIV4 VACC NO PRSV 0.5 ML IM: CPT | Mod: S$GLB,,, | Performed by: NURSE PRACTITIONER

## 2020-09-14 PROCEDURE — 71046 XR CHEST PA AND LATERAL: ICD-10-PCS | Mod: 26,,, | Performed by: RADIOLOGY

## 2020-09-14 PROCEDURE — 71046 X-RAY EXAM CHEST 2 VIEWS: CPT | Mod: TC,FY,PO

## 2020-09-14 PROCEDURE — 90471 FLU VACCINE (QUAD) GREATER THAN OR EQUAL TO 3YO PRESERVATIVE FREE IM: ICD-10-PCS | Mod: S$GLB,,, | Performed by: NURSE PRACTITIONER

## 2020-09-14 PROCEDURE — 99214 OFFICE O/P EST MOD 30 MIN: CPT | Mod: 25,S$GLB,, | Performed by: NURSE PRACTITIONER

## 2020-09-14 PROCEDURE — 90471 IMMUNIZATION ADMIN: CPT | Mod: S$GLB,,, | Performed by: NURSE PRACTITIONER

## 2020-09-14 PROCEDURE — 99214 PR OFFICE/OUTPT VISIT, EST, LEVL IV, 30-39 MIN: ICD-10-PCS | Mod: 25,S$GLB,, | Performed by: NURSE PRACTITIONER

## 2020-09-14 NOTE — PROGRESS NOTES
"Subjective:       Patient ID: Renetta Durham is a 25 y.o. female.    Chief Complaint: Fatigue    THE PATIENT PRESENTS TODAY WITH THE FOLLOWING COMPLAINTS  SOB with minimal exertion  Limb weakness, feels like arms and legs are weak  Heart fluttering/pounding  Excessive sweating/cold/hot intolerance  Cough/wheezing over the past few months.  This has been dry.  Feet swelling, turns red at times.  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  She is currently on Vyvanse 40 mg daily.    Review of Systems   Constitutional: Positive for fatigue. Negative for appetite change, chills and fever.   HENT: Negative for ear pain and postnasal drip.    Eyes: Negative for pain and itching.   Respiratory: Positive for shortness of breath. Negative for chest tightness.    Cardiovascular: Positive for palpitations and leg swelling.   Gastrointestinal: Negative for abdominal distention and abdominal pain.   Endocrine: Negative for polydipsia and polyuria.   Genitourinary: Negative for difficulty urinating and flank pain.   Skin: Negative for color change and pallor.   Neurological: Negative for light-headedness and headaches.   Hematological: Negative for adenopathy. Does not bruise/bleed easily.   Psychiatric/Behavioral: Negative for agitation.       Past medical, surgical, family and social history reviewed.  Objective:     Vitals:    09/14/20 1207   BP: 112/78   Pulse: 88   Resp: 18   Temp: 98.4 °F (36.9 °C)   TempSrc: Temporal   Weight: 95.4 kg (210 lb 3.3 oz)   Height: 5' 3" (1.6 m)   PainSc:   7   PainLoc: Foot     Body mass index is 37.24 kg/m².     Physical Exam  Constitutional:       General: She is not in acute distress.     Appearance: She is well-developed. She is obese. She is not diaphoretic.   HENT:      Head: Normocephalic and atraumatic.      Right Ear: Hearing, tympanic membrane, ear canal and external ear normal.      Left Ear: Hearing, tympanic membrane, ear " canal and external ear normal.      Nose: Nose normal.      Mouth/Throat:      Pharynx: Uvula midline.   Eyes:      General:         Right eye: No discharge.         Left eye: No discharge.      Conjunctiva/sclera: Conjunctivae normal.      Pupils: Pupils are equal, round, and reactive to light.   Neck:      Musculoskeletal: Normal range of motion and neck supple.      Thyroid: No thyromegaly.      Vascular: No carotid bruit or JVD.      Trachea: Trachea normal.   Cardiovascular:      Rate and Rhythm: Normal rate and regular rhythm.      Heart sounds: No murmur. No friction rub. No gallop.    Pulmonary:      Effort: Pulmonary effort is normal. No respiratory distress.      Breath sounds: Normal breath sounds. No wheezing or rales.   Chest:      Chest wall: No tenderness.   Abdominal:      General: Bowel sounds are normal. There is no distension.      Palpations: Abdomen is soft. There is no mass.      Tenderness: There is no abdominal tenderness. There is no guarding or rebound.   Musculoskeletal: Normal range of motion.   Skin:     General: Skin is warm and dry.   Neurological:      Mental Status: She is alert and oriented to person, place, and time.      Coordination: Coordination normal.   Psychiatric:         Behavior: Behavior normal.         Thought Content: Thought content normal.         Judgment: Judgment normal.         Assessment:       1. Palpitations    2. Fatigue, unspecified type    3. Foot swelling    4. Laboratory exam ordered as part of routine general medical examination    5. WHITE (dyspnea on exertion)        Plan:       Renetta was seen today for fatigue.    Diagnoses and all orders for this visit:    Palpitations  -     SCHEDULED EKG 12-LEAD (to Muse); Future  -     Holter monitor - 48 hour; Future  -     Echo Color Flow Doppler? Yes; Future  -     TSH; Future    Fatigue, unspecified type  -     Vitamin B12; Future    Foot swelling  -     Brain Natriuretic Peptide; Future    Laboratory exam  ordered as part of routine general medical examination  -     CBC auto differential; Future  -     Comprehensive metabolic panel; Future  -     Hemoglobin A1C; Future  -     Lipid Panel; Future    WHITE (dyspnea on exertion)  -     SCHEDULED EKG 12-LEAD (to Muse); Future  -     Holter monitor - 48 hour; Future  -     Echo Color Flow Doppler? Yes; Future  -     X-Ray Chest PA And Lateral; Future    Other orders  -     Influenza - Quadrivalent (PF)

## 2020-09-18 ENCOUNTER — PATIENT OUTREACH (OUTPATIENT)
Dept: ADMINISTRATIVE | Facility: HOSPITAL | Age: 25
End: 2020-09-18

## 2020-09-18 NOTE — PROGRESS NOTES
Chart review completed 2020.  Care Everywhere updates requested and reviewed.  Immunizations reconciled. Media reports reviewed.  Duplicate HM overrides and  orders removed.  Overdue HM topic chart audit and/or requested.  Overdue lab testing linked to upcoming lab appointments if applies.        There are no preventive care reminders to display for this patient.

## 2020-09-23 ENCOUNTER — CLINICAL SUPPORT (OUTPATIENT)
Dept: CARDIOLOGY | Facility: CLINIC | Age: 25
End: 2020-09-23
Attending: NURSE PRACTITIONER
Payer: COMMERCIAL

## 2020-09-23 DIAGNOSIS — R00.2 PALPITATIONS: ICD-10-CM

## 2020-09-23 DIAGNOSIS — R06.09 DOE (DYSPNEA ON EXERTION): ICD-10-CM

## 2020-09-23 PROCEDURE — 93000 ELECTROCARDIOGRAM COMPLETE: CPT | Mod: S$GLB,,, | Performed by: INTERNAL MEDICINE

## 2020-09-23 PROCEDURE — 93224 HOLTER MONITOR - 48 HOUR (CUPID ONLY): ICD-10-PCS | Mod: S$GLB,,, | Performed by: INTERNAL MEDICINE

## 2020-09-23 PROCEDURE — 93000 EKG 12-LEAD: ICD-10-PCS | Mod: S$GLB,,, | Performed by: INTERNAL MEDICINE

## 2020-09-23 PROCEDURE — 93224 XTRNL ECG REC UP TO 48 HRS: CPT | Mod: S$GLB,,, | Performed by: INTERNAL MEDICINE

## 2020-09-28 ENCOUNTER — OFFICE VISIT (OUTPATIENT)
Dept: FAMILY MEDICINE | Facility: CLINIC | Age: 25
End: 2020-09-28
Payer: COMMERCIAL

## 2020-09-28 DIAGNOSIS — M79.7 FIBROMYALGIA: ICD-10-CM

## 2020-09-28 DIAGNOSIS — M25.50 ARTHRALGIA, UNSPECIFIED JOINT: Primary | ICD-10-CM

## 2020-09-28 DIAGNOSIS — M79.10 MUSCLE PAIN: ICD-10-CM

## 2020-09-28 DIAGNOSIS — R21 RASH: ICD-10-CM

## 2020-09-28 PROCEDURE — 99213 OFFICE O/P EST LOW 20 MIN: CPT | Mod: 95,,, | Performed by: NURSE PRACTITIONER

## 2020-09-28 PROCEDURE — 99213 PR OFFICE/OUTPT VISIT, EST, LEVL III, 20-29 MIN: ICD-10-PCS | Mod: 95,,, | Performed by: NURSE PRACTITIONER

## 2020-09-29 LAB
OHS CV EVENT MONITOR DAY: 0
OHS CV HOLTER LENGTH DECIMAL HOURS: 48
OHS CV HOLTER LENGTH HOURS: 48
OHS CV HOLTER LENGTH MINUTES: 0

## 2020-09-30 ENCOUNTER — LAB VISIT (OUTPATIENT)
Dept: LAB | Facility: HOSPITAL | Age: 25
End: 2020-09-30
Attending: NURSE PRACTITIONER
Payer: COMMERCIAL

## 2020-09-30 DIAGNOSIS — M25.50 ARTHRALGIA, UNSPECIFIED JOINT: ICD-10-CM

## 2020-09-30 DIAGNOSIS — M79.10 MUSCLE PAIN: ICD-10-CM

## 2020-09-30 DIAGNOSIS — R21 RASH: ICD-10-CM

## 2020-09-30 LAB
CRP SERPL-MCNC: 0.8 MG/L (ref 0–8.2)
ERYTHROCYTE [SEDIMENTATION RATE] IN BLOOD BY WESTERGREN METHOD: 10 MM/HR (ref 0–20)
RHEUMATOID FACT SERPL-ACNC: <10 IU/ML (ref 0–15)

## 2020-09-30 PROCEDURE — 85651 RBC SED RATE NONAUTOMATED: CPT | Mod: PO

## 2020-09-30 PROCEDURE — 86038 ANTINUCLEAR ANTIBODIES: CPT

## 2020-09-30 PROCEDURE — 86431 RHEUMATOID FACTOR QUANT: CPT

## 2020-09-30 PROCEDURE — 86140 C-REACTIVE PROTEIN: CPT

## 2020-09-30 PROCEDURE — 36415 COLL VENOUS BLD VENIPUNCTURE: CPT | Mod: PO

## 2020-10-01 LAB — ANA SER QL IF: NORMAL

## 2020-10-01 NOTE — TELEPHONE ENCOUNTER
----- Message from Matt Couch sent at 10/1/2020  3:38 PM CDT -----  Type:  RX Refill Request    Who Called:  Patient  Refill or New Rx:  Refill    RX Name and Strength:  lisdexamfetamine (VYVANSE) 40 MG Cap      How is the patient currently taking it? (ex. 1XDay): 1XDay  Is this a 30 day or 90 day RX: 30  Preferred Pharmacy with phone number:    [id number] - [place], [place] - [address]  [address] [address]  Phone: [phone] Fax: [phone]    Local or Mail Order:  Local  Ordering Provider:  Topher Pond Call Back Number:  [phone]  Additional Information:

## 2020-10-01 NOTE — PROGRESS NOTES
The patient location is: Home  The chief complaint leading to consultation is: 15 mins  Visit type: audiovisual  Total time spent with patient: 15 mins  Each patient to whom he or she provides medical services by telemedicine is:  (1) informed of the relationship between the physician and patient and the respective role of any other health care provider with respect to management of the patient; and (2) notified that he or she may decline to receive medical services by telemedicine and may withdraw from such care at any time.    HPI:  The patient presents today with myalgias, arthralgias and a slight recurrent rash.  The patient tells me she has not injured herself but she continues to have bilateral joint pain to the knees, hips and muscular pain widespread.  She has seen rheumatology in the past for this and would like to see them again.  The patient does have a history of narcolepsy on Vyvanse 40 mg daily.  She does feel like this medication is working well for her.  She was diagnosed with fibromyalgia and put on gabapentin and Flexeril with some relief.  She does not exercise on a regular basis.    Review of Systems:  Psychiatric/Behavioral: Negative for agitation, behavioral problems, confusion, decreased concentration, dysphoric mood, hallucinations, self-injury, sleep disturbance and suicidal ideas. The patient is not nervous/anxious and is not hyperactive.          Physical Exam  Constitutional:       General: She is not in acute distress.     Appearance: Normal appearance. She is not ill-appearing, toxic-appearing or diaphoretic.   HENT:      Head: Normocephalic and atraumatic.      Nose: Nose normal.   Eyes:      General: No scleral icterus.     Conjunctiva/sclera: Conjunctivae normal.   Neck:      Musculoskeletal: Neck supple.   Pulmonary:      Effort: No respiratory distress.   Neurological:      Mental Status: She is alert.   Psychiatric:         Attention and Perception: Attention and perception  normal.         Mood and Affect: Mood normal.         Speech: Speech normal.         Behavior: Behavior normal.         Thought Content: Thought content normal.         Cognition and Memory: Cognition normal.         Judgment: Judgment normal.     Assess/Plan:    Diagnoses and all orders for this visit:    Arthralgia, unspecified joint  -     C-Reactive Protein; Future  -     Sedimentation rate; Future  -     Rheumatoid factor; Future  -     COREY Screen w/Reflex; Future    Muscle pain  -     C-Reactive Protein; Future  -     Sedimentation rate; Future  -     Rheumatoid factor; Future  -     COREY Screen w/Reflex; Future    Rash  -     COREY Screen w/Reflex; Future

## 2020-10-02 ENCOUNTER — TELEPHONE (OUTPATIENT)
Dept: FAMILY MEDICINE | Facility: CLINIC | Age: 25
End: 2020-10-02

## 2020-10-02 RX ORDER — LISDEXAMFETAMINE DIMESYLATE 40 MG/1
40 CAPSULE ORAL DAILY
Qty: 30 CAPSULE | Refills: 0 | Status: SHIPPED | OUTPATIENT
Start: 2020-10-02 | End: 2020-11-06

## 2020-10-02 NOTE — TELEPHONE ENCOUNTER
Fax received from pharmacy stating PA is required for Vyvanse 40mg capsules. Attempted to initiate PA  and this is the message I got:    Vyvanse 40MG capsules has been rejected by insurance.  68%. The likelihood this medication will be covered by the patient's insurance plan if you attempt a prior authorization.  Please research and evaluate therapy options for this patient. You can learn more about alternative     Key: [id number]

## 2020-10-02 NOTE — TELEPHONE ENCOUNTER
PA submitted via [email or web address]     Key: [id number]   PA Case ID: [id number]   Rx #: [id number]    Awaiting response

## 2020-10-05 NOTE — TELEPHONE ENCOUNTER
Please let the patient know that Vyvanse is not approved, we can try another medication such as concerta or nuvigil, is ok with that I will send one and see what is covered

## 2020-10-05 NOTE — TELEPHONE ENCOUNTER
PA has been denied for Vyvanse. Vyvanse is not supported by an accepted medical resource for the treatment of Narcolepsy. Please advise.

## 2020-10-07 ENCOUNTER — TELEPHONE (OUTPATIENT)
Dept: FAMILY MEDICINE | Facility: CLINIC | Age: 25
End: 2020-10-07

## 2020-10-07 DIAGNOSIS — G47.411 NARCOLEPSY AND CATAPLEXY: Primary | ICD-10-CM

## 2020-10-07 NOTE — TELEPHONE ENCOUNTER
----- Message from [facility] sent at 10/7/2020  4:19 PM CDT -----  Pt called to get a refill on lisdexamfetamine (VYVANSE) 40 MG Cap    [facility] pharmacy [phone]    Pt can be reached at [phone]

## 2020-10-07 NOTE — TELEPHONE ENCOUNTER
"See 10/2 encounter, patient is willing to try concerta as she has tried nuvigil previously "and had a bad reaction"   "

## 2020-10-08 RX ORDER — METHYLPHENIDATE HYDROCHLORIDE 36 MG/1
36 TABLET ORAL EVERY MORNING
Qty: 30 TABLET | Refills: 0 | Status: SHIPPED | OUTPATIENT
Start: 2020-10-08 | End: 2020-11-06

## 2020-10-08 NOTE — TELEPHONE ENCOUNTER
concerta sent., tell her let's do a VV at end of her prescription and we can see how it is going and/or adjust dosage if necessary.

## 2020-10-12 ENCOUNTER — TELEPHONE (OUTPATIENT)
Dept: FAMILY MEDICINE | Facility: CLINIC | Age: 25
End: 2020-10-12

## 2020-10-12 NOTE — TELEPHONE ENCOUNTER
PA approved for Methylphenid 36mg tablets from 10/12/20-10/12/21.  Approval faxed to the pharmacy.

## 2020-10-12 NOTE — TELEPHONE ENCOUNTER
Fax received from pharmacy stating PA is required for Methylphenidate 26mg ER tablets.     PA has been initiated   [email or web address]  Key: [id number]

## 2020-10-21 ENCOUNTER — OFFICE VISIT (OUTPATIENT)
Dept: FAMILY MEDICINE | Facility: CLINIC | Age: 25
End: 2020-10-21
Payer: COMMERCIAL

## 2020-10-21 VITALS
HEART RATE: 84 BPM | WEIGHT: 210.63 LBS | DIASTOLIC BLOOD PRESSURE: 70 MMHG | RESPIRATION RATE: 18 BRPM | TEMPERATURE: 98 F | HEIGHT: 63 IN | BODY MASS INDEX: 37.32 KG/M2 | SYSTOLIC BLOOD PRESSURE: 114 MMHG

## 2020-10-21 DIAGNOSIS — M79.672 LEFT FOOT PAIN: Primary | ICD-10-CM

## 2020-10-21 DIAGNOSIS — R30.0 DYSURIA: ICD-10-CM

## 2020-10-21 DIAGNOSIS — R60.9 EDEMA, UNSPECIFIED TYPE: ICD-10-CM

## 2020-10-21 DIAGNOSIS — Z30.9 ENCOUNTER FOR CONTRACEPTIVE MANAGEMENT, UNSPECIFIED TYPE: ICD-10-CM

## 2020-10-21 LAB
AMORPH CRY UR QL COMP ASSIST: ABNORMAL
BACTERIA #/AREA URNS AUTO: ABNORMAL /HPF
BILIRUB UR QL STRIP: NEGATIVE
CLARITY UR REFRACT.AUTO: ABNORMAL
COLOR UR AUTO: ABNORMAL
GLUCOSE UR QL STRIP: NEGATIVE
HGB UR QL STRIP: ABNORMAL
HYALINE CASTS UR QL AUTO: 0 /LPF
KETONES UR QL STRIP: NEGATIVE
LEUKOCYTE ESTERASE UR QL STRIP: NEGATIVE
MICROSCOPIC COMMENT: ABNORMAL
NITRITE UR QL STRIP: NEGATIVE
PH UR STRIP: 5 [PH] (ref 5–8)
PROT UR QL STRIP: ABNORMAL
RBC #/AREA URNS AUTO: 15 /HPF (ref 0–4)
SP GR UR STRIP: 1.02 (ref 1–1.03)
SQUAMOUS #/AREA URNS AUTO: 9 /HPF
URN SPEC COLLECT METH UR: ABNORMAL
WBC #/AREA URNS AUTO: 11 /HPF (ref 0–5)

## 2020-10-21 PROCEDURE — 3008F BODY MASS INDEX DOCD: CPT | Mod: CPTII,S$GLB,, | Performed by: NURSE PRACTITIONER

## 2020-10-21 PROCEDURE — 81001 URINALYSIS AUTO W/SCOPE: CPT

## 2020-10-21 PROCEDURE — 99214 OFFICE O/P EST MOD 30 MIN: CPT | Mod: S$GLB,,, | Performed by: NURSE PRACTITIONER

## 2020-10-21 PROCEDURE — 3008F PR BODY MASS INDEX (BMI) DOCUMENTED: ICD-10-PCS | Mod: CPTII,S$GLB,, | Performed by: NURSE PRACTITIONER

## 2020-10-21 PROCEDURE — 99214 PR OFFICE/OUTPT VISIT, EST, LEVL IV, 30-39 MIN: ICD-10-PCS | Mod: S$GLB,,, | Performed by: NURSE PRACTITIONER

## 2020-10-21 RX ORDER — HYDROCHLOROTHIAZIDE 25 MG/1
12.5 TABLET ORAL DAILY PRN
Qty: 15 TABLET | Refills: 11 | Status: SHIPPED | OUTPATIENT
Start: 2020-10-21 | End: 2021-08-10

## 2020-10-21 RX ORDER — NORGESTIMATE AND ETHINYL ESTRADIOL 0.25-0.035
1 KIT ORAL DAILY
Qty: 90 TABLET | Refills: 3 | Status: SHIPPED | OUTPATIENT
Start: 2020-10-21 | End: 2021-10-09

## 2020-10-23 ENCOUNTER — PATIENT MESSAGE (OUTPATIENT)
Dept: FAMILY MEDICINE | Facility: CLINIC | Age: 25
End: 2020-10-23

## 2020-10-23 NOTE — PROGRESS NOTES
"Subjective:       Patient ID: Renetta Durham is a 25 y.o. female.    Chief Complaint: Bilateral feet swelling and Lt foot pain    The patient presents today with complaints of swelling of the feet bilaterally.  She tells me predominantly she has pain to the left foot at times.  This is to the top of the foot and sometimes to the bottom of the foot.  No specific heel pain.  She denies any increased sodium to her diet.  She does feel like she is having some dysuria so she would like to have a urine checked while she is here.  No hematuria.  No fever or chills.  She also tells me she would like to get back on birth control pills while she is here.  This works very well for her and she did not have any side effects to the medication.  She does not smoke.  Last Pap smear 07/2020 and normal    Review of Systems   Constitutional: Negative for appetite change, chills and fever.   HENT: Negative for ear pain and postnasal drip.    Eyes: Negative for pain and itching.   Respiratory: Negative for chest tightness and shortness of breath.    Gastrointestinal: Negative for abdominal distention and abdominal pain.   Endocrine: Negative for polydipsia and polyuria.   Genitourinary: Positive for dysuria. Negative for difficulty urinating and flank pain.   Skin: Negative for color change and pallor.   Neurological: Negative for light-headedness and headaches.   Hematological: Negative for adenopathy. Does not bruise/bleed easily.   Psychiatric/Behavioral: Negative for agitation.       Past medical, surgical, family and social history reviewed.  Objective:     Vitals:    10/21/20 1151   BP: 114/70   Pulse: 84   Resp: 18   Temp: 97.5 °F (36.4 °C)   TempSrc: Temporal   Weight: 95.5 kg (210 lb 10.4 oz)   Height: 5' 3" (1.6 m)   PainSc:   7   PainLoc: Foot     Body mass index is 37.31 kg/m².     Physical Exam  Constitutional:       General: She is not in acute distress.     Appearance: She is well-developed. She is obese. She is not " diaphoretic.   HENT:      Head: Normocephalic and atraumatic.      Right Ear: Hearing, tympanic membrane, ear canal and external ear normal.      Left Ear: Hearing, tympanic membrane, ear canal and external ear normal.      Nose: Nose normal.      Mouth/Throat:      Pharynx: Uvula midline.   Eyes:      General:         Right eye: No discharge.         Left eye: No discharge.      Conjunctiva/sclera: Conjunctivae normal.      Pupils: Pupils are equal, round, and reactive to light.   Neck:      Musculoskeletal: Normal range of motion and neck supple.      Thyroid: No thyromegaly.      Vascular: No carotid bruit or JVD.      Trachea: Trachea normal.   Cardiovascular:      Rate and Rhythm: Normal rate and regular rhythm.      Heart sounds: No murmur. No friction rub. No gallop.       Comments: Trace edema to the lower extremities.  Pulmonary:      Effort: Pulmonary effort is normal. No respiratory distress.      Breath sounds: Normal breath sounds. No wheezing or rales.   Chest:      Chest wall: No tenderness.   Abdominal:      General: Bowel sounds are normal. There is no distension.      Palpations: Abdomen is soft. There is no mass.      Tenderness: There is no abdominal tenderness. There is no guarding or rebound.   Musculoskeletal: Normal range of motion.   Skin:     General: Skin is warm and dry.   Neurological:      Mental Status: She is alert and oriented to person, place, and time.      Coordination: Coordination normal.   Psychiatric:         Behavior: Behavior normal.         Thought Content: Thought content normal.         Judgment: Judgment normal.         Assessment:       1. Left foot pain    2. Dysuria        Plan:       Renetta was seen today for bilateral feet swelling and lt foot pain.    Diagnoses and all orders for this visit:    Left foot pain  -     Ambulatory referral/consult to Podiatry; Future    Dysuria  -     URINALYSIS    Encounter for contraceptive management, unspecified type  -      norgestimate-ethinyl estradioL (ORTHO-CYCLEN) 0.25-35 mg-mcg per tablet; Take 1 tablet by mouth once daily.    Edema, unspecified type  -     hydroCHLOROthiazide (HYDRODIURIL) 25 MG tablet; Take 0.5 tablets (12.5 mg total) by mouth daily as needed.  Reduce sodium intake.  Weight loss.    Other orders  -     Urinalysis Microscopic

## 2020-10-26 ENCOUNTER — OFFICE VISIT (OUTPATIENT)
Dept: FAMILY MEDICINE | Facility: CLINIC | Age: 25
End: 2020-10-26
Payer: COMMERCIAL

## 2020-10-26 VITALS
HEART RATE: 84 BPM | SYSTOLIC BLOOD PRESSURE: 130 MMHG | WEIGHT: 214.63 LBS | TEMPERATURE: 98 F | HEIGHT: 63 IN | BODY MASS INDEX: 38.03 KG/M2 | DIASTOLIC BLOOD PRESSURE: 70 MMHG | OXYGEN SATURATION: 96 % | RESPIRATION RATE: 18 BRPM

## 2020-10-26 DIAGNOSIS — Z00.00 ROUTINE PHYSICAL EXAMINATION: Primary | ICD-10-CM

## 2020-10-26 DIAGNOSIS — R30.0 DYSURIA: ICD-10-CM

## 2020-10-26 LAB
BILIRUB SERPL-MCNC: NEGATIVE MG/DL
BLOOD URINE, POC: ABNORMAL
CLARITY, POC UA: ABNORMAL
COLOR, POC UA: YELLOW
GLUCOSE UR QL STRIP: 50
KETONES UR QL STRIP: NEGATIVE
LEUKOCYTE ESTERASE URINE, POC: NEGATIVE
NITRITE, POC UA: NEGATIVE
PH, POC UA: 5
PROTEIN, POC: NEGATIVE
SPECIFIC GRAVITY, POC UA: 1.03
UROBILINOGEN, POC UA: NEGATIVE

## 2020-10-26 PROCEDURE — 99395 PREV VISIT EST AGE 18-39: CPT | Mod: S$GLB,,, | Performed by: NURSE PRACTITIONER

## 2020-10-26 PROCEDURE — 81002 POCT URINE DIPSTICK WITHOUT MICROSCOPE: ICD-10-PCS | Mod: S$GLB,,, | Performed by: NURSE PRACTITIONER

## 2020-10-26 PROCEDURE — 99395 PR PREVENTIVE VISIT,EST,18-39: ICD-10-PCS | Mod: S$GLB,,, | Performed by: NURSE PRACTITIONER

## 2020-10-26 PROCEDURE — 81002 URINALYSIS NONAUTO W/O SCOPE: CPT | Mod: S$GLB,,, | Performed by: NURSE PRACTITIONER

## 2020-10-26 NOTE — PROGRESS NOTES
"Subjective:       Patient ID: Renetta Durham is a 25 y.o. female.    Chief Complaint: Annual Exam (Physical for DMV due to medical conditions)    The patient is here for routine physical for DMV, hx narcolepsy stable on concerta.      Review of Systems   Constitutional: Negative for appetite change, chills and fever.   HENT: Negative for ear pain and postnasal drip.    Eyes: Negative for pain and itching.   Respiratory: Negative for chest tightness and shortness of breath.    Gastrointestinal: Negative for abdominal distention and abdominal pain.   Endocrine: Negative for polydipsia and polyuria.   Genitourinary: Negative for difficulty urinating and flank pain.   Skin: Negative for color change and pallor.   Neurological: Negative for light-headedness and headaches.   Hematological: Negative for adenopathy. Does not bruise/bleed easily.   Psychiatric/Behavioral: Negative for agitation.       Past medical, surgical, family and social history reviewed.  Objective:     Vitals:    10/26/20 0947   BP: 130/70   Pulse: 84   Resp: 18   Temp: 97.9 °F (36.6 °C)   TempSrc: Temporal   SpO2: 96%   Weight: 97.3 kg (214 lb 9.9 oz)   Height: 5' 3" (1.6 m)   PainSc:   8   PainLoc: Foot     Body mass index is 38.02 kg/m².     Physical Exam  Constitutional:       General: She is not in acute distress.     Appearance: She is well-developed. She is not diaphoretic.   HENT:      Head: Normocephalic and atraumatic.      Right Ear: Hearing, tympanic membrane, ear canal and external ear normal.      Left Ear: Hearing, tympanic membrane, ear canal and external ear normal.      Nose: Nose normal.      Mouth/Throat:      Pharynx: Uvula midline.   Eyes:      General:         Right eye: No discharge.         Left eye: No discharge.      Conjunctiva/sclera: Conjunctivae normal.      Pupils: Pupils are equal, round, and reactive to light.   Neck:      Musculoskeletal: Normal range of motion and neck supple.      Thyroid: No thyromegaly.      " Vascular: No carotid bruit or JVD.      Trachea: Trachea normal.   Cardiovascular:      Rate and Rhythm: Normal rate and regular rhythm.      Heart sounds: No murmur. No friction rub. No gallop.    Pulmonary:      Effort: Pulmonary effort is normal. No respiratory distress.      Breath sounds: Normal breath sounds. No wheezing or rales.   Chest:      Chest wall: No tenderness.   Abdominal:      General: Bowel sounds are normal. There is no distension.      Palpations: Abdomen is soft. There is no mass.      Tenderness: There is no abdominal tenderness. There is no guarding or rebound.   Musculoskeletal: Normal range of motion.   Skin:     General: Skin is warm and dry.   Neurological:      Mental Status: She is alert and oriented to person, place, and time.      Coordination: Coordination normal.   Psychiatric:         Behavior: Behavior normal.         Thought Content: Thought content normal.         Judgment: Judgment normal.         Assessment:       1. Routine physical examination    2. Dysuria        Plan:       Renetta was seen today for annual exam.    Diagnoses and all orders for this visit:    Routine physical examination    Dysuria  -     POCT URINE DIPSTICK WITHOUT MICROSCOPE  Results for orders placed or performed in visit on 10/26/20   POCT URINE DIPSTICK WITHOUT MICROSCOPE   Result Value Ref Range    Color, UA Yellow     Spec Grav UA 1.030     pH, UA 5     WBC, UA negative     Nitrite, UA negative     Protein negative     Glucose, UA 50     Ketones, UA negative     Urobilinogen, UA negative     Bilirubin negative     Blood, UA trace     Clarity, UA Slightly Cloudy

## 2020-11-06 ENCOUNTER — PATIENT MESSAGE (OUTPATIENT)
Dept: FAMILY MEDICINE | Facility: CLINIC | Age: 25
End: 2020-11-06

## 2020-11-06 ENCOUNTER — OFFICE VISIT (OUTPATIENT)
Dept: FAMILY MEDICINE | Facility: CLINIC | Age: 25
End: 2020-11-06
Payer: COMMERCIAL

## 2020-11-06 DIAGNOSIS — G47.411 NARCOLEPSY AND CATAPLEXY: Primary | ICD-10-CM

## 2020-11-06 PROCEDURE — 99213 PR OFFICE/OUTPT VISIT, EST, LEVL III, 20-29 MIN: ICD-10-PCS | Mod: 95,,, | Performed by: NURSE PRACTITIONER

## 2020-11-06 PROCEDURE — 99213 OFFICE O/P EST LOW 20 MIN: CPT | Mod: 95,,, | Performed by: NURSE PRACTITIONER

## 2020-11-06 NOTE — PROGRESS NOTES
The patient location is: car  The chief complaint leading to consultation is: medication follow up   Visit type: Virtual visit with synchronous audio and video  Total time spent with patient: 10 mins   Each patient to whom he or she provides medical services by telemedicine is:  (1) informed of the relationship between the physician and patient and the respective role of any other health care provider with respect to management of the patient; and (2) notified that he or she may decline to receive medical services by telemedicine and may withdraw from such care at any time.    HPI:  Patient is here for follow-up visit.  The patient has narcolepsy any recently around Concerta 36 mg daily because her insurance company would not pay for Vyvanse.  Vyvanse worked extremely well for her for many years.  She tells me that the Concerta it does not work as well which she is not having any side effects to medication.    Review of Systems   Constitutional: Negative for activity change and appetite change.   HENT: Negative for congestion, postnasal drip, rhinorrhea and sinus pressure.    Eyes: Negative for pain and redness.   Respiratory: Negative for choking and chest tightness.    Gastrointestinal: Negative for abdominal distention, abdominal pain, blood in stool, constipation, diarrhea, nausea and vomiting.   Endocrine: Negative for polydipsia and polyphagia.   Genitourinary: Negative for dysuria and hematuria.   Musculoskeletal: Negative for arthralgias and myalgias.   Skin: Negative for color change and rash.   Neurological: Negative for dizziness and headaches.   Psychiatric/Behavioral: Negative for agitation and behavioral problems.     Physical Exam   Constitutional: The patient is oriented to person, place, and time. He appears well-developed and well-nourished.   HENT: Head: Normocephalic and atraumatic.  Skin: Skin dry.   Psychiatric: Normal mood and affect.    Assess/Plan:  Diagnoses and all orders for this  visit:    Narcolepsy and cataplexy  increase  -     methylphenidate HCl 54 MG CR tablet; Take 1 tablet (54 mg total) by mouth every morning.  -     methylphenidate HCl 54 MG CR tablet; Take 1 tablet (54 mg total) by mouth every morning.  -     methylphenidate HCl 54 MG CR tablet; Take 1 tablet (54 mg total) by mouth every morning.

## 2020-11-08 RX ORDER — METHYLPHENIDATE HYDROCHLORIDE 54 MG/1
54 TABLET ORAL EVERY MORNING
Qty: 30 TABLET | Refills: 0 | Status: SHIPPED | OUTPATIENT
Start: 2020-11-08 | End: 2021-02-22 | Stop reason: SDUPTHER

## 2020-11-08 RX ORDER — METHYLPHENIDATE HYDROCHLORIDE 54 MG/1
54 TABLET ORAL EVERY MORNING
Qty: 30 TABLET | Refills: 0 | Status: SHIPPED | OUTPATIENT
Start: 2020-12-06 | End: 2021-02-22 | Stop reason: SDUPTHER

## 2020-11-08 RX ORDER — METHYLPHENIDATE HYDROCHLORIDE 54 MG/1
54 TABLET ORAL EVERY MORNING
Qty: 30 TABLET | Refills: 0 | Status: SHIPPED | OUTPATIENT
Start: 2021-01-06 | End: 2021-02-19 | Stop reason: SDUPTHER

## 2020-11-11 ENCOUNTER — TELEPHONE (OUTPATIENT)
Dept: FAMILY MEDICINE | Facility: CLINIC | Age: 25
End: 2020-11-11

## 2020-11-11 NOTE — TELEPHONE ENCOUNTER
----- Message from April Stewart sent at 11/11/2020  2:40 PM CST -----  Regarding: Appointment Access  Name of Who is Calling: Renetta Durham      What is the request in detail:   Patient called requesting to cancel and reschedule her TRANSTHORACIC ECHO. Please further advise      Reply by [facility]: no      Call Back:  [phone]

## 2020-11-16 ENCOUNTER — OFFICE VISIT (OUTPATIENT)
Dept: FAMILY MEDICINE | Facility: CLINIC | Age: 25
End: 2020-11-16
Payer: COMMERCIAL

## 2020-11-16 DIAGNOSIS — M79.7 FIBROMYALGIA: ICD-10-CM

## 2020-11-16 DIAGNOSIS — F33.0 MAJOR DEPRESSIVE DISORDER, RECURRENT EPISODE, MILD: ICD-10-CM

## 2020-11-16 DIAGNOSIS — R51.9 NONINTRACTABLE HEADACHE, UNSPECIFIED CHRONICITY PATTERN, UNSPECIFIED HEADACHE TYPE: Primary | ICD-10-CM

## 2020-11-16 PROCEDURE — 99213 OFFICE O/P EST LOW 20 MIN: CPT | Mod: 95,,, | Performed by: NURSE PRACTITIONER

## 2020-11-16 PROCEDURE — 99213 PR OFFICE/OUTPT VISIT, EST, LEVL III, 20-29 MIN: ICD-10-PCS | Mod: 95,,, | Performed by: NURSE PRACTITIONER

## 2020-11-16 RX ORDER — PREDNISONE 20 MG/1
40 TABLET ORAL DAILY
Qty: 20 TABLET | Refills: 0 | Status: SHIPPED | OUTPATIENT
Start: 2020-11-16 | End: 2020-11-26

## 2020-11-16 RX ORDER — CITALOPRAM 20 MG/1
TABLET, FILM COATED ORAL
Qty: 45 TABLET | Refills: 11 | Status: SHIPPED | OUTPATIENT
Start: 2020-11-16 | End: 2021-03-04

## 2020-11-16 RX ORDER — ALMOTRIPTAN 12.5 MG/1
12.5 TABLET, FILM COATED ORAL
Qty: 20 TABLET | Refills: 0 | Status: SHIPPED | OUTPATIENT
Start: 2020-11-16 | End: 2020-12-17

## 2020-11-17 ENCOUNTER — TELEPHONE (OUTPATIENT)
Dept: FAMILY MEDICINE | Facility: CLINIC | Age: 25
End: 2020-11-17

## 2020-11-17 NOTE — TELEPHONE ENCOUNTER
----- Message from Rhonda Obando NP sent at 11/16/2020  2:35 PM CST -----  Message patient--needs follow up appointment with neurology -was seeing kristal for headaches

## 2020-11-18 ENCOUNTER — OFFICE VISIT (OUTPATIENT)
Dept: NEUROLOGY | Facility: CLINIC | Age: 25
End: 2020-11-18
Payer: COMMERCIAL

## 2020-11-18 ENCOUNTER — TELEPHONE (OUTPATIENT)
Dept: OPHTHALMOLOGY | Facility: CLINIC | Age: 25
End: 2020-11-18

## 2020-11-18 ENCOUNTER — TELEPHONE (OUTPATIENT)
Dept: NEUROLOGY | Facility: CLINIC | Age: 25
End: 2020-11-18

## 2020-11-18 ENCOUNTER — TELEPHONE (OUTPATIENT)
Dept: PHARMACY | Facility: CLINIC | Age: 25
End: 2020-11-18

## 2020-11-18 VITALS
HEART RATE: 76 BPM | SYSTOLIC BLOOD PRESSURE: 114 MMHG | WEIGHT: 213.31 LBS | TEMPERATURE: 97 F | BODY MASS INDEX: 37.78 KG/M2 | DIASTOLIC BLOOD PRESSURE: 81 MMHG | RESPIRATION RATE: 18 BRPM

## 2020-11-18 DIAGNOSIS — G44.40 MEDICATION OVERUSE HEADACHE: ICD-10-CM

## 2020-11-18 DIAGNOSIS — M54.2 NECK PAIN: ICD-10-CM

## 2020-11-18 DIAGNOSIS — G43.711 INTRACTABLE CHRONIC MIGRAINE WITHOUT AURA AND WITH STATUS MIGRAINOSUS: Primary | ICD-10-CM

## 2020-11-18 DIAGNOSIS — H47.10 OPTIC NERVE EDEMA: ICD-10-CM

## 2020-11-18 DIAGNOSIS — G47.33 OBSTRUCTIVE SLEEP APNEA: ICD-10-CM

## 2020-11-18 DIAGNOSIS — G43.701 CHRONIC MIGRAINE WITHOUT AURA WITH STATUS MIGRAINOSUS, NOT INTRACTABLE: ICD-10-CM

## 2020-11-18 PROCEDURE — 99999 PR PBB SHADOW E&M-EST. PATIENT-LVL V: CPT | Mod: PBBFAC,,, | Performed by: NURSE PRACTITIONER

## 2020-11-18 PROCEDURE — 3008F BODY MASS INDEX DOCD: CPT | Mod: CPTII,S$GLB,, | Performed by: NURSE PRACTITIONER

## 2020-11-18 PROCEDURE — 1125F AMNT PAIN NOTED PAIN PRSNT: CPT | Mod: S$GLB,,, | Performed by: NURSE PRACTITIONER

## 2020-11-18 PROCEDURE — 1125F PR PAIN SEVERITY QUANTIFIED, PAIN PRESENT: ICD-10-PCS | Mod: S$GLB,,, | Performed by: NURSE PRACTITIONER

## 2020-11-18 PROCEDURE — 99214 OFFICE O/P EST MOD 30 MIN: CPT | Mod: S$GLB,,, | Performed by: NURSE PRACTITIONER

## 2020-11-18 PROCEDURE — 99214 PR OFFICE/OUTPT VISIT, EST, LEVL IV, 30-39 MIN: ICD-10-PCS | Mod: S$GLB,,, | Performed by: NURSE PRACTITIONER

## 2020-11-18 PROCEDURE — 3008F PR BODY MASS INDEX (BMI) DOCUMENTED: ICD-10-PCS | Mod: CPTII,S$GLB,, | Performed by: NURSE PRACTITIONER

## 2020-11-18 PROCEDURE — 99999 PR PBB SHADOW E&M-EST. PATIENT-LVL V: ICD-10-PCS | Mod: PBBFAC,,, | Performed by: NURSE PRACTITIONER

## 2020-11-18 RX ORDER — GALCANEZUMAB 120 MG/ML
120 INJECTION, SOLUTION SUBCUTANEOUS
Qty: 1 ML | Refills: 11 | Status: SHIPPED | OUTPATIENT
Start: 2020-11-18 | End: 2021-08-10

## 2020-11-18 RX ORDER — PROCHLORPERAZINE MALEATE 10 MG
10 TABLET ORAL EVERY 6 HOURS PRN
Qty: 60 TABLET | Refills: 11 | Status: SHIPPED | OUTPATIENT
Start: 2020-11-18 | End: 2022-01-01

## 2020-11-18 NOTE — PROGRESS NOTES
"Date of service: 11/18/2020  Referring provider: No ref. provider found    Subjective:      Chief complaint: Headache       Patient ID: Renetta Durham is a 25 y.o. lady with anxiety, asthma, depression, TMJ problems, fibromyalgia, narcolepsy presenting for follow up of headaches      She follows with  for primary narcolepsy - has not seen since 2016    History of Present Illness    INTERVAL HISTORY 11/18/2020  Last visit was 18 months ago with Dr. Anthony. At that time, Dr. Anthony suspected a diagnosis of pseudotumor cerebri. MRI brain and orbits was WNL. Eye exam with Dr. Gardner did not reveal optic disc swelling. She was started on Emgality. At that time, her risk factors for PTC included rapid weight gain, estrogen containing birth control, and hypervitaminosis A due to retin A cream. Weight at last visit was 216 lbs and weight today is 213 lbs.     She was seen in ED for migraine on 10/5/2020. Head CT negative. She was given IV fluids, compazine, benadryl, and Toradol.      Today she reports she is much worse. She has constant migraine that gets worse throughout the day. Headaches occur in the back of the head/neck and shoots to the sides and above eyes. Current pain 6 with range 4-10. She has headaches 7 days per week. She wakes up with a headache and progresses as day goes on. She takes excedrin or ibuprofen 4-5 days per week.    Regarding vision problems, she reports these are mainly at night. Vision is "really shiny", "bart like a glare". Reports occasionally trouble with vision even with glasses on. Last eye exam 7/23/19 with Dr. Gardner.    She was on Emgality for 4-5 months. She reports it helped. She stopped because "it stopped getting delivered". She was having headache-free days while on emgality. She reports "a weird reaction" to Maxalt. Her PCP gave her Axert but she has not tried this yet.    She is on ortho-cyclen birth control (started this month). She is not using her CPAP machine. " She is no longer using Retin-A cream.    She also reports new numbness and tingling in face and arms. Began two months ago and occurs a few times a week. Each episode lasts a few hours. Affects different parts of the body each time but usually left side. Has occurred on the right.     ORIGINAL HEADACHE HISTORY - 7/2/19   Age at onset and course over time:  Started and diagnosed with migraines 8 years ago at age 16, no precipitating cause, but then was diagnosed with narcolepsy and that took over, migraines becoming steadily progressive over the years, this past year was the worst ever when they became every day. Recently black spots in her vision before a headache, last hours. Mom with multiple sclerosis. Grandma with cluster headaches.     She reports that she can hardly see out of her right eye, this is been for several years, was diagnosed with a cataract in the right eye.  Last formal eye exam was 2017 with Dr Park at which point her visual acuity was 20/20 and her optic discs were sharp bilaterally.     High school weight 134 lbs  Hill year rapid weight gain - 60 - 100 lbs  Current stable weight 216 lbs     Location:  Most of the time Back of head, neck, temples, above right eye  Quality:  [x] pressure [] tight [x] throbbing [] sharp [] stabbing   Severity:  Current 5, range 4 to 10  Duration:  Hr to days  Frequency:  Daily  Headaches awaken at night?:  Yes, once a month  Worst time of day: end of the day before bed   Associated with: [x] photophobia [x]  phonophobia [x] osmophobia [] blurred vision  [] double vision [x] loss of appetite [x] nausea [x] vomiting [x] dizziness [x] vertigo  [x] tinnitus [x] irritability [] sinus pressure [x] problems with concentration   [x] neck tightness   Alleviated by:  [] sleep [] darkness [] massage [] heat [] ice [] medication  Exacerbated by:  [x] fatigue [x] light [x] noise [x] smells [x] coughing [] sneezing  [x] bending over [x] ovulation [x] menses [x] alcohol [x]  change in weather [x]  stress  Ipsilateral autonomic: [] nasal congestion [] lacrimation [] ptosis [] injection [] edema [] foreign body sensation [] ear fullness   ICP:  Retin-A cream (x few months); used to get tunnel vision for a few minutes when she would bend over none since 2 months; high pitched ringing both ears; sometimes pulsatile tinnitus   Sleep habits: narcolepsy, does not sleep well during the night, naps during the day,  + TEA no cpap at this time (caused her to break out first night)   Caffeine intake: 1-2   Gyn status (if female):  On estrogen containing oral contraceptive with headache during placebo week. Prior to that was on depo shot since high school with significant resulting weight gain     Current acute treatment   Excedrin - 4 days per week  Ibuprofen - 4 days per week   Flexeril  Axert - has not tried  zofran  Compazine     Current prevention:  Gabapentin 300mg day/ 600mg at night   (Celexa)  (HCTZ)    Previously tried/failed acute treatment:  Fioricet  Maxalt - triptan reaction     Previously tried/failed preventative treatment:  None     Review of patient's allergies indicates:   Allergen Reactions    No known drug allergies      Current Outpatient Medications   Medication Sig Dispense Refill    almotriptan (AXERT) 12.5 MG tablet Take 1 tablet (12.5 mg total) by mouth as needed for Migraine. may repeat in 2 hours if needed 20 tablet 0    citalopram (CELEXA) 20 MG tablet TAKE 1 & 1/2 TABLETS BY MOUTH ONCE DAILY. 45 tablet 11    cyclobenzaprine (FLEXERIL) 5 MG tablet Take 1 tablet (5 mg total) by mouth as needed for Muscle spasms. 30 tablet 6    fluticasone (FLONASE) 50 mcg/actuation nasal spray 1 spray by Each Nare route once daily. 3 Bottle 3    hydroCHLOROthiazide (HYDRODIURIL) 25 MG tablet Take 0.5 tablets (12.5 mg total) by mouth daily as needed. 15 tablet 11    ibuprofen (ADVIL,MOTRIN) 600 MG tablet Take 1 tablet (600 mg total) by mouth every 6 (six) hours as needed for Pain.  20 tablet 0    meloxicam (MOBIC) 15 MG tablet Take 1 tablet (15 mg total) by mouth once daily. 10 tablet 0    methylphenidate HCl 54 MG CR tablet Take 1 tablet (54 mg total) by mouth every morning. 30 tablet 0    [START ON 12/6/2020] methylphenidate HCl 54 MG CR tablet Take 1 tablet (54 mg total) by mouth every morning. 30 tablet 0    [START ON 1/6/2021] methylphenidate HCl 54 MG CR tablet Take 1 tablet (54 mg total) by mouth every morning. 30 tablet 0    norgestimate-ethinyl estradioL (ORTHO-CYCLEN) 0.25-35 mg-mcg per tablet Take 1 tablet by mouth once daily. 90 tablet 3    ondansetron (ZOFRAN) 4 MG tablet Take 1 tablet (4 mg total) by mouth every 6 (six) hours as needed for Nausea. 12 tablet 0    pantoprazole (PROTONIX) 40 MG tablet Take 1 tablet by mouth Daily.       predniSONE (DELTASONE) 20 MG tablet Take 2 tablets (40 mg total) by mouth once daily. for 10 days 20 tablet 0    prochlorperazine (COMPAZINE) 10 MG tablet Take 1 tablet (10 mg total) by mouth every 6 (six) hours as needed (migraine or nausea). 60 tablet 11    gabapentin (NEURONTIN) 300 MG capsule Take 2 capsules (600 mg total) by mouth 2 (two) times daily. 120 capsule 11    galcanezumab-gnlm 120 mg/mL PnIj Inject 240 mg (2 injections) subcutaneous at separate sites, once (loading dose). Start maintenance dose 28 days later 2 mL 0    galcanezumab-gnlm (EMGALITY PEN) 120 mg/mL PnIj Inject 120 mg into the skin every 28 days. 1 mL 11    triamcinolone acetonide 0.1% (KENALOG) 0.1 % cream Apply to wound twice a day (Patient not taking: Reported on 11/18/2020) 1 Tube 0     No current facility-administered medications for this visit.        Past Medical History  Past Medical History:   Diagnosis Date    Anxiety     Asthma     Depression     Fibromyalgia     Headache(784.0)     Migraine     Narcolepsy     Obesity, Class II, BMI 35-39.9, no comorbidity 5/7/2016    Urinary tract infection     Vision abnormalities     glasses       Past  Surgical History  Past Surgical History:   Procedure Laterality Date    WISDOM TOOTH EXTRACTION         Family History  Family History   Problem Relation Age of Onset    Mental illness Other     Arthritis Mother     Asthma Sister         half sister    Miscarriages / Stillbirths Sister         half sister    Vision loss Maternal Aunt     Cancer Maternal Aunt     Arthritis Maternal Grandmother     Cataracts Maternal Grandmother     Cancer Maternal Grandfather         colon, liver, lungs    Diabetes Paternal Grandmother     Heart disease Paternal Grandmother     Hypertension Paternal Grandmother     COPD Paternal Grandfather     Amblyopia Neg Hx     Blindness Neg Hx     Glaucoma Neg Hx     Macular degeneration Neg Hx     Retinal detachment Neg Hx     Strabismus Neg Hx     Stroke Neg Hx     Thyroid disease Neg Hx        Social History  Social History     Socioeconomic History    Marital status: Single     Spouse name: Not on file    Number of children: Not on file    Years of education: Not on file    Highest education level: Not on file   Occupational History    Not on file   Social Needs    Financial resource strain: Not on file    Food insecurity     Worry: Not on file     Inability: Not on file    Transportation needs     Medical: Not on file     Non-medical: Not on file   Tobacco Use    Smoking status: Never Smoker    Smokeless tobacco: Never Used   Substance and Sexual Activity    Alcohol use: No    Drug use: No    Sexual activity: Yes     Partners: Male     Birth control/protection: Condom, OCP   Lifestyle    Physical activity     Days per week: Not on file     Minutes per session: Not on file    Stress: Not on file   Relationships    Social connections     Talks on phone: Not on file     Gets together: Not on file     Attends Hinduism service: Not on file     Active member of club or organization: Not on file     Attends meetings of clubs or organizations: Not on file      Relationship status: Not on file   Other Topics Concern    Not on file   Social History Narrative    Not on file        Review of Systems  14-point review of systems as follows:   No check raysa indicates NEGATIVE response   Constitutional: [] weight loss, [x] change to appetite   Eyes: [x] change in vision, [] double vision   Ears, nose, mouth, throat: [] frequent nose bleeds, [x] ringing in the ears   Respiratory: [] cough, [] wheezing   Cardiovascular: [] chest pain, [x] palpitations   Gastrointestinal: [] jaundice, [] nausea/vomiting   Genitourinary: [] incontinence, [] burning with urination   Hematologic/lymphatic: [x] easy bruising/bleeding, [x] night sweats   Neurological: [x] numbness, [x] weakness   Endocrine: [x] fatigue, [x] heat/cold intolerance   Allergy/Immunologic: [] fevers, [x] chills   Musculoskeletal: [x] muscle pain, [x] joint pain   Psychiatric: [] thoughts of harming self/others, [x] depression   Integumentary: [] rashes, [x] sores that do not heal     Objective:        Vitals:    11/18/20 1014   BP: 114/81   Pulse: 76   Resp: 18   Temp: 97.3 °F (36.3 °C)     Body mass index is 37.78 kg/m².    11/18/2020  I cannot appreciate optic disc swelling    7/2/19  Constitutional: appears in no acute distress, well-developed, well-nourished     Eyes: normal conjunctiva, PERRLA, right optic disc with blurred margins 360 deg and elevation, left optic disc flat and sharp. Color vision (HRR plates 5-10): 6/6 bilateral     Ears, nose, mouth, throat: external appearance of ears and nose normal, hearing intact     Cardiovascular: regular rate and rhythm, no murmurs appreciated    Respiratory: unlabored respirations, breath sounds normal bilaterally    Gastrointestinal: no visible abdominal masses, no guarding, no visible hernia    Musculoskeletal: normal tone in all four extremities. No atrophy. No abnormal movements. No pronator drift. No orbit. Symmetric finger tapping. Normal gait and station. Digits and  nails normal.      Spine:   CERVICAL SPINE:  ROM: normal   MUSCLE SPASM:  Moderate  FACET LOADING:  Bilateral  SPURLING: no  ADITYA / JESSICA tender:  Bilateral    Psychiatric: normal judgment and insight. Oriented to person, place, and time.     Neurologic:   Cortical functions: recent and remote memory intact, normal attention span and concentration, speech fluent, adequate fund of knowledge   Cranial nerves: visual fields full, PERRLA, EOMI, symmetric facial strength, hearing intact, palate elevates symmetrically, shoulder shrug 5/5, tongue protrudes midline   Reflexes: 3+ in the upper and lower extremities, bilateral Villarreal, negative Babinski,  Sensation: intact to temperature throughout   Coordination: normal finger to nose    Data Review:     I have personally reviewed the referring provider's notes, labs, & imaging made available to me today.      RADIOLOGY STUDIES:  I have personally reviewed the pertinent images performed.     No results found for this or any previous visit.    Lab Results   Component Value Date    BNP <10 09/14/2020     10/05/2020    K 3.7 10/05/2020    MG 1.9 10/05/2020     10/05/2020    CO2 27 10/05/2020    BUN 7 10/05/2020    CREATININE 0.73 10/05/2020    GLU 87 10/05/2020    HGBA1C 5.3 09/14/2020    AST 26 10/05/2020    ALT 21 10/05/2020    ALBUMIN 4.5 10/05/2020    PROT 7.6 10/05/2020    BILITOT 0.4 10/05/2020    CHOL 156 09/14/2020    HDL 44 09/14/2020    LDLCALC 95.0 09/14/2020    TRIG 85 09/14/2020       Lab Results   Component Value Date    WBC 11.99 10/05/2020    HGB 14.1 10/05/2020    HCT 41.5 10/05/2020    MCV 87 10/05/2020     10/05/2020       Lab Results   Component Value Date    TSH 0.871 09/14/2020           Assessment & Plan:       Problem List Items Addressed This Visit        Neuro    Intractable chronic migraine without aura and with status migrainosus - Primary    Relevant Medications    galcanezumab-gnlm (EMGALITY PEN) 120 mg/mL PnIj    galcanezumab-gnlm  120 mg/mL PnIj    prochlorperazine (COMPAZINE) 10 MG tablet    Medication overuse headache    Relevant Medications    prochlorperazine (COMPAZINE) 10 MG tablet       Ophtho    Optic nerve edema    Relevant Orders    Ambulatory referral/consult to Ophthalmology       Other    Obstructive sleep apnea      Other Visit Diagnoses     Neck pain        Relevant Orders    Ambulatory Consult to Back & Spine Clinic    Chronic migraine without aura with status migrainosus, not intractable        Relevant Medications    prochlorperazine (COMPAZINE) 10 MG tablet              Previous work up for pseudotumor cerebri did not indicate any optic disc swelling with opthalmology or imaging. She has stopped retin A but has not lost weight, nor stop estrogen containing birth controlling nor is she compliant with CPAP. Will have her follow up with another eye exam as her vision is still poor.     She seemingly was improving on Emgality and having several headache days per week. We will resume this. Discussed the need to be compliant with CPAP machine. Untreated sleep apnea is likely a large component to her wake-up headaches. She is also still in medication overuse to Excedrin and ibuprofen. Her PCP prescribed a 10 day course of prednisone. We can also use this as a bridge to stop her overuse.     Start Axert for acute escalations. Continue compazine as this has been helpful for her.             TESTING:  -- none    REFERRALS:  -- referral to neurophthalmology, Dr. Norwood  -- referral to back and spine clinic to evaluate neck pain    PREVENTION (use daily regardless of headache):  -- Start Emgality injection once every 28 days (2 prescriptions, first month give yourself TWO shots, all other months give yourself ONE shot) (will come from [facility], they will call you)  -- highly recommend you start using your CPAP machine. This will help your wake-up headaches    AS-NEEDED TREATMENT (use total no more than 10 days per  month unless otherwise stated):  -- Start Axert (rizatriptan) at onset of migraine. May repeat every 2 hours. No more than 3 doses per day, 10 days per month  -- continue Compazine 10 mg, half or full tablet up to 4 times a day as needed for mild headache (nausea pill that also helps headache)  -- START prednisone per PCP. This will also help you stop daily aleve/excedrin. You are likely developing a rebound headache to them    Follow up in about 3 months (around 2/18/2021) for follow up with [facility].      Mitra Wright, NP

## 2020-11-18 NOTE — PATIENT INSTRUCTIONS
TESTING:  -- none    REFERRALS:  -- referral to neurophthalmology, Dr. Norwood  -- referral to back and spine clinic to evaluate neck pain    PREVENTION (use daily regardless of headache):  -- Start Emgality injection once every 28 days (2 prescriptions, first month give yourself TWO shots, all other months give yourself ONE shot) (will come from [facility], they will call you)  -- highly recommend you start using your CPAP machine. This will help your wake-up headaches    AS-NEEDED TREATMENT (use total no more than 10 days per month unless otherwise stated):  -- Start Axert (rizatriptan) at onset of migraine. May repeat every 2 hours. No more than 3 doses per day, 10 days per month  -- continue Compazine 10 mg, half or full tablet up to 4 times a day as needed for mild headache (nausea pill that also helps headache)  -- START prednisone per PCP. This will also help you stop daily aleve/excedrin. You are likely developing a rebound headache to them

## 2020-11-18 NOTE — TELEPHONE ENCOUNTER
The prior authorization for Renetta Durham's Emgality 120mg/mL has been submitted on 11/18/2020 @ 12:29pm.  It can take up to 72 hours for a decision to be rendered from the insurance.  Patient is aware of PA and process.  Please let me know if you have any questions.    Thank You!   Kristin Schneider CPhT, B.A  Patient Care Advocate   [facility]  Phone: [phone] Ext [phone]  Fax: [phone]

## 2020-11-20 NOTE — PROGRESS NOTES
The patient location is: Home  The chief complaint leading to consultation is: HEADACHE  Visit type: audiovisual  Total time spent with patient: 15 mins  Each patient to whom he or she provides medical services by telemedicine is:  (1) informed of the relationship between the physician and patient and the respective role of any other health care provider with respect to management of the patient; and (2) notified that he or she may decline to receive medical services by telemedicine and may withdraw from such care at any time.    HPI:  The patient presents today with persistent unilateral headache on the right side that will not go away.  Some photophobia.  Mild nausea.  No vomiting.  No vision changes.  Overall she is having overwhelming muscular pain.  History of fibromyalgia.  She does request a refill on her Celexa and which she uses for depression.  She does feel like her moods have been stable.  No suicidal or homicidal ideations.      Review of Systems:  Psychiatric/Behavioral: Negative for agitation, behavioral problems, confusion, decreased concentration, dysphoric mood, hallucinations, self-injury, sleep disturbance and suicidal ideas. The patient is not nervous/anxious and is not hyperactive.        Physical Exam  Constitutional:       General: She is not in acute distress.     Appearance: Normal appearance. She is not ill-appearing, toxic-appearing or diaphoretic.   HENT:      Head: Normocephalic and atraumatic.      Nose: Nose normal.   Eyes:      General: No scleral icterus.     Conjunctiva/sclera: Conjunctivae normal.   Neck:      Musculoskeletal: Neck supple.   Pulmonary:      Effort: No respiratory distress.   Neurological:      Mental Status: She is alert.   Psychiatric:         Attention and Perception: Attention and perception normal.         Mood and Affect: Mood normal.         Speech: Speech normal.         Behavior: Behavior normal.         Thought Content: Thought content normal.          Cognition and Memory: Cognition normal.         Judgment: Judgment normal.     Assess/Plan:      Diagnoses and all orders for this visit:    Nonintractable headache, unspecified chronicity pattern, unspecified headache type  -     almotriptan (AXERT) 12.5 MG tablet; Take 1 tablet (12.5 mg total) by mouth as needed for Migraine. may repeat in 2 hours if needed    Fibromyalgia  -     predniSONE (DELTASONE) 20 MG tablet; Take 2 tablets (40 mg total) by mouth once daily. for 10 days    Major depressive disorder, recurrent episode, mild  -     citalopram (CELEXA) 20 MG tablet; TAKE 1 & 1/2 TABLETS BY MOUTH ONCE DAILY.        Answers for HPI/ROS submitted by the patient on 11/16/2020   activity change: No  unexpected weight change: No  neck pain: Yes  hearing loss: No  rhinorrhea: No  trouble swallowing: Yes  eye discharge: No  visual disturbance: Yes  chest tightness: No  wheezing: No  chest pain: No  palpitations: No  blood in stool: No  constipation: No  vomiting: No  diarrhea: No  polydipsia: No  polyuria: No  difficulty urinating: No  hematuria: No  menstrual problem: No  dysuria: No  joint swelling: Yes  arthralgias: Yes  headaches: Yes  weakness: Yes  confusion: Yes  dysphoric mood: No

## 2020-11-23 ENCOUNTER — TELEPHONE (OUTPATIENT)
Dept: OPHTHALMOLOGY | Facility: CLINIC | Age: 25
End: 2020-11-23

## 2020-11-23 ENCOUNTER — OFFICE VISIT (OUTPATIENT)
Dept: SPINE | Facility: CLINIC | Age: 25
End: 2020-11-23
Payer: COMMERCIAL

## 2020-11-23 VITALS
HEIGHT: 63 IN | DIASTOLIC BLOOD PRESSURE: 76 MMHG | WEIGHT: 213.19 LBS | SYSTOLIC BLOOD PRESSURE: 109 MMHG | BODY MASS INDEX: 37.77 KG/M2 | HEART RATE: 78 BPM

## 2020-11-23 DIAGNOSIS — M54.2 NECK PAIN: ICD-10-CM

## 2020-11-23 DIAGNOSIS — R29.2 HYPER REFLEXIA: ICD-10-CM

## 2020-11-23 DIAGNOSIS — R20.2 PARESTHESIAS: Primary | ICD-10-CM

## 2020-11-23 PROCEDURE — 1125F PR PAIN SEVERITY QUANTIFIED, PAIN PRESENT: ICD-10-PCS | Mod: S$GLB,,, | Performed by: PHYSICIAN ASSISTANT

## 2020-11-23 PROCEDURE — 99999 PR PBB SHADOW E&M-EST. PATIENT-LVL V: ICD-10-PCS | Mod: PBBFAC,,, | Performed by: PHYSICIAN ASSISTANT

## 2020-11-23 PROCEDURE — 99203 OFFICE O/P NEW LOW 30 MIN: CPT | Mod: S$GLB,,, | Performed by: PHYSICIAN ASSISTANT

## 2020-11-23 PROCEDURE — 3008F BODY MASS INDEX DOCD: CPT | Mod: CPTII,S$GLB,, | Performed by: PHYSICIAN ASSISTANT

## 2020-11-23 PROCEDURE — 3008F PR BODY MASS INDEX (BMI) DOCUMENTED: ICD-10-PCS | Mod: CPTII,S$GLB,, | Performed by: PHYSICIAN ASSISTANT

## 2020-11-23 PROCEDURE — 1125F AMNT PAIN NOTED PAIN PRSNT: CPT | Mod: S$GLB,,, | Performed by: PHYSICIAN ASSISTANT

## 2020-11-23 PROCEDURE — 99999 PR PBB SHADOW E&M-EST. PATIENT-LVL V: CPT | Mod: PBBFAC,,, | Performed by: PHYSICIAN ASSISTANT

## 2020-11-23 PROCEDURE — 99203 PR OFFICE/OUTPT VISIT, NEW, LEVL III, 30-44 MIN: ICD-10-PCS | Mod: S$GLB,,, | Performed by: PHYSICIAN ASSISTANT

## 2020-11-23 NOTE — TELEPHONE ENCOUNTER
Left message for pt to return call to schedule an appt with Dr Norwood for Optic nerve edema per Dr Mitra Wright.

## 2020-11-23 NOTE — LETTER
November 23, 2020      [place]  [address] [facility] [address] [place] [address]           [facility]  [address]  [place] [address]  Phone: [phone]  Fax: [phone]          Patient: Renetta Durham   MR Number: [id number]   YOB: 1995   Date of Visit: 11/23/2020       Dear Mitra Wright:    Thank you for referring Renetta Durham to me for evaluation. Attached you will find relevant portions of my assessment and plan of care.    If you have questions, please do not hesitate to call me. I look forward to following Renetta Durham along with you.    Sincerely,    Laurie Redmond PA-C    Enclosure  CC:  No Recipients    If you would like to receive this communication electronically, please contact [email or web address] or [phone] to request more information on [employer] Link access.    For providers and/or their staff who would like to refer a patient to [facility], please contact us through our one-stop-shop provider referral line, Denice Finch, at [phone].    If you feel you have received this communication in error or would no longer like to receive these types of communications, please e-mail [email or web address]

## 2020-11-23 NOTE — PROGRESS NOTES
Back and Spine Consult    Patient ID: Renetta Durham is a 25 y.o. female.    Chief Complaint   Patient presents with    Neck Pain     She has had neck pain that began 2 months ago. She has headaches.    Numbness     She now has numbness and tingling in her face and arms.       Review of Systems   Constitutional: Positive for fatigue. Negative for activity change, appetite change, chills, fever and unexpected weight change.   HENT: Negative for tinnitus, trouble swallowing and voice change.    Respiratory: Negative for apnea, cough, chest tightness and shortness of breath.    Cardiovascular: Negative for chest pain and palpitations.   Gastrointestinal: Negative for constipation, diarrhea, nausea and vomiting.   Genitourinary: Negative for difficulty urinating, dysuria, frequency and urgency.   Musculoskeletal: Negative for back pain, gait problem, neck pain and neck stiffness.   Skin: Negative for wound.   Neurological: Positive for tremors, weakness and headaches. Negative for dizziness, seizures, facial asymmetry, speech difficulty, light-headedness and numbness.   Psychiatric/Behavioral: Negative for confusion and decreased concentration.       Past Medical History:   Diagnosis Date    Anxiety     Asthma     Depression     Fibromyalgia     Headache(784.0)     Migraine     Narcolepsy     Obesity, Class II, BMI 35-39.9, no comorbidity 5/7/2016    Urinary tract infection     Vision abnormalities     glasses     Social History     Socioeconomic History    Marital status: Single     Spouse name: Not on file    Number of children: Not on file    Years of education: Not on file    Highest education level: Not on file   Occupational History    Not on file   Social Needs    Financial resource strain: Not on file    Food insecurity     Worry: Not on file     Inability: Not on file    Transportation needs     Medical: Not on file     Non-medical: Not on file   Tobacco Use    Smoking status: Never  Smoker    Smokeless tobacco: Never Used   Substance and Sexual Activity    Alcohol use: No    Drug use: No    Sexual activity: Yes     Partners: Male     Birth control/protection: Condom, OCP   Lifestyle    Physical activity     Days per week: Not on file     Minutes per session: Not on file    Stress: Not on file   Relationships    Social connections     Talks on phone: Not on file     Gets together: Not on file     Attends [employer] service: Not on file     Active member of club or organization: Not on file     Attends meetings of clubs or organizations: Not on file     Relationship status: Not on file   Other Topics Concern    Not on file   Social History Narrative    Not on file     Family History   Problem Relation Age of Onset    Mental illness Other     Arthritis Mother     Asthma Sister         half sister    Miscarriages / Stillbirths Sister         half sister    Vision loss Maternal Aunt     Cancer Maternal Aunt     Arthritis Maternal Grandmother     Cataracts Maternal Grandmother     Cancer Maternal Grandfather         colon, liver, lungs    Diabetes Paternal Grandmother     Heart disease Paternal Grandmother     Hypertension Paternal Grandmother     COPD Paternal Grandfather     Amblyopia Neg Hx     Blindness Neg Hx     Glaucoma Neg Hx     Macular degeneration Neg Hx     Retinal detachment Neg Hx     Strabismus Neg Hx     Stroke Neg Hx     Thyroid disease Neg Hx      Review of patient's allergies indicates:   Allergen Reactions    No known drug allergies        Current Outpatient Medications:     almotriptan (AXERT) 12.5 MG tablet, Take 1 tablet (12.5 mg total) by mouth as needed for Migraine. may repeat in 2 hours if needed, Disp: 20 tablet, Rfl: 0    citalopram (CELEXA) 20 MG tablet, TAKE 1 & 1/2 TABLETS BY MOUTH ONCE DAILY., Disp: 45 tablet, Rfl: 11    cyclobenzaprine (FLEXERIL) 5 MG tablet, Take 1 tablet (5 mg total) by mouth as needed for Muscle spasms., Disp: 30  tablet, Rfl: 6    fluticasone (FLONASE) 50 mcg/actuation nasal spray, 1 spray by Each Nare route once daily., Disp: 3 Bottle, Rfl: 3    galcanezumab-gnlm (EMGALITY PEN) 120 mg/mL PnIj, Inject 1 pen (120 mg total) into the skin every 28 days., Disp: 1 mL, Rfl: 11    galcanezumab-gnlm 120 mg/mL PnIj, Inject 240 mg (2 injections) subcutaneous at separate sites, once (loading dose). Start maintenance dose 28 days later, Disp: 2 mL, Rfl: 0    hydroCHLOROthiazide (HYDRODIURIL) 25 MG tablet, Take 0.5 tablets (12.5 mg total) by mouth daily as needed., Disp: 15 tablet, Rfl: 11    ibuprofen (ADVIL,MOTRIN) 600 MG tablet, Take 1 tablet (600 mg total) by mouth every 6 (six) hours as needed for Pain., Disp: 20 tablet, Rfl: 0    meloxicam (MOBIC) 15 MG tablet, Take 1 tablet (15 mg total) by mouth once daily., Disp: 10 tablet, Rfl: 0    methylphenidate HCl 54 MG CR tablet, Take 1 tablet (54 mg total) by mouth every morning., Disp: 30 tablet, Rfl: 0    [START ON 12/6/2020] methylphenidate HCl 54 MG CR tablet, Take 1 tablet (54 mg total) by mouth every morning., Disp: 30 tablet, Rfl: 0    [START ON 1/6/2021] methylphenidate HCl 54 MG CR tablet, Take 1 tablet (54 mg total) by mouth every morning., Disp: 30 tablet, Rfl: 0    norgestimate-ethinyl estradioL (ORTHO-CYCLEN) 0.25-35 mg-mcg per tablet, Take 1 tablet by mouth once daily., Disp: 90 tablet, Rfl: 3    ondansetron (ZOFRAN) 4 MG tablet, Take 1 tablet (4 mg total) by mouth every 6 (six) hours as needed for Nausea., Disp: 12 tablet, Rfl: 0    pantoprazole (PROTONIX) 40 MG tablet, Take 1 tablet by mouth Daily. , Disp: , Rfl:     predniSONE (DELTASONE) 20 MG tablet, Take 2 tablets (40 mg total) by mouth once daily. for 10 days, Disp: 20 tablet, Rfl: 0    prochlorperazine (COMPAZINE) 10 MG tablet, Take 1 tablet (10 mg total) by mouth every 6 (six) hours as needed (migraine or nausea)., Disp: 60 tablet, Rfl: 11    triamcinolone acetonide 0.1% (KENALOG) 0.1 % cream, Apply  "to wound twice a day, Disp: 1 Tube, Rfl: 0    gabapentin (NEURONTIN) 300 MG capsule, Take 2 capsules (600 mg total) by mouth 2 (two) times daily., Disp: 120 capsule, Rfl: 11    Vitals:    11/23/20 1557   BP: 109/76   BP Location: Left arm   Patient Position: Sitting   BP Method: Large (Automatic)   Pulse: 78   Weight: 96.7 kg (213 lb 3 oz)   Height: 5' 3" (1.6 m)       Physical Exam  Vitals signs and nursing note reviewed.   Constitutional:       Appearance: She is well-developed.   HENT:      Head: Normocephalic and atraumatic.   Eyes:      Pupils: Pupils are equal, round, and reactive to light.   Neck:      Musculoskeletal: Normal range of motion and neck supple.   Cardiovascular:      Rate and Rhythm: Normal rate.   Pulmonary:      Effort: Pulmonary effort is normal.   Musculoskeletal: Normal range of motion.   Skin:     General: Skin is warm and dry.   Neurological:      Mental Status: She is alert and oriented to person, place, and time.      Coordination: Finger-Nose-Finger Test, Heel to Shin Test and Romberg Test normal.      Gait: Gait is intact. Tandem walk normal.      Deep Tendon Reflexes:      Reflex Scores:       Tricep reflexes are 3+ on the right side and 3+ on the left side.       Bicep reflexes are 3+ on the right side and 3+ on the left side.       Brachioradialis reflexes are 3+ on the right side and 3+ on the left side.       Patellar reflexes are 3+ on the right side and 3+ on the left side.       Achilles reflexes are 3+ on the right side and 3+ on the left side.  Psychiatric:         Speech: Speech normal.         Behavior: Behavior normal.         Thought Content: Thought content normal.         Judgment: Judgment normal.         Neurologic Exam     Mental Status   Oriented to person, place, and time.   Oriented to person.   Oriented to place.   Oriented to time.   Follows 3 step commands.   Attention: normal. Concentration: normal.   Speech: speech is normal   Level of consciousness: " alert  Knowledge: consistent with education.   Able to name object. Able to read. Able to repeat. Able to write. Normal comprehension.     Cranial Nerves     CN II   Visual acuity: normal  Right visual field deficit: none  Left visual field deficit: none     CN III, IV, VI   Pupils are equal, round, and reactive to light.  Right pupil: Size: 3 mm. Shape: regular. Reactivity: brisk. Consensual response: intact.   Left pupil: Size: 3 mm. Shape: regular. Reactivity: brisk. Consensual response: intact.   CN III: no CN III palsy  CN VI: no CN VI palsy  Nystagmus: none   Diplopia: none  Ophthalmoparesis: none  Conjugate gaze: present    CN V   Right facial sensation deficit: none  Left facial sensation deficit: none    CN VII   Right facial weakness: none  Left facial weakness: none    CN VIII   Hearing: intact    CN IX, X   CN IX normal.   CN X normal.     CN XI   Right sternocleidomastoid strength: normal  Left sternocleidomastoid strength: normal  Right trapezius strength: normal  Left trapezius strength: normal    CN XII   Fasciculations: absent  Tongue deviation: none    Motor Exam   Muscle bulk: normal  Overall muscle tone: normal  Right arm pronator drift: absent  Left arm pronator drift: absent    Strength   Right neck flexion: 5/5  Left neck flexion: 5/5  Right neck extension: 5/5  Left neck extension: 5/5  Right deltoid: 5/5  Left deltoid: 5/5  Right biceps: 5/5  Left biceps: 5/5  Right triceps: 5/5  Left triceps: 5/5  Right wrist flexion: 5/5  Left wrist flexion: 5/5  Right wrist extension: 5/5  Left wrist extension: 5/5  Right interossei: 5/5  Left interossei: 5/5  Right abdominals: 5/5  Left abdominals: 5/5  Right iliopsoas: 5/5  Left iliopsoas: 5/5  Right quadriceps: 5/5  Left quadriceps: 5/5  Right hamstrin/5  Left hamstrin/5  Right glutei: 5/5  Left glutei: 5/5  Right anterior tibial: 5/5  Left anterior tibial: 5/5  Right posterior tibial: 5/5  Left posterior tibial: 5/5  Right peroneal: 5/5  Left  peroneal: 5/5  Right gastroc: 5/5  Left gastroc: 5/5    Sensory Exam   Right arm light touch: normal  Left arm light touch: normal  Right leg light touch: normal  Left leg light touch: normal  Right arm vibration: normal  Left arm vibration: normal  Right arm pinprick: normal  Left arm pinprick: normal    Gait, Coordination, and Reflexes     Gait  Gait: normal    Coordination   Romberg: negative  Finger to nose coordination: normal  Heel to shin coordination: normal  Tandem walking coordination: normal    Tremor   Resting tremor: absent  Intention tremor: absent  Action tremor: absent    Reflexes   Right brachioradialis: 3+  Left brachioradialis: 3+  Right biceps: 3+  Left biceps: 3+  Right triceps: 3+  Left triceps: 3+  Right patellar: 3+  Left patellar: 3+  Right achilles: 3+  Left achilles: 3+  Right Villarreal: absent  Left Villarreal: absent  Right ankle clonus: absent  Left ankle clonus: absent      Provider dictation:    25-year-old female with history of migraine headaches is referred by Mitra Wright for evaluation of neck pain and paresthesias throughout the body.  She describes pain in the posterior neck and occipital region of the head.  For the last 1-2 months without trauma this is been associated with numbness and tingling felt throughout the face and extremities.  Each episode of numbness and tingling and last up to a few hours and can affect different parts the body is any different time.  Usually affects the left side more than the right side.  During numbness and tingling episodes she also feels weak and if her limbs are paralyzed.  She has tried ibuprofen and gabapentin without relief.  She underwent shoulder physical therapy in August of 2020 which did help some with shoulder pain.  She has never had epidural steroid injection.    Oswestry score: 62%.  PHQ:  22.    On exam she has 3+ muscle stretch reflexes throughout the upper and lower extremities.  Gait and station fluid.  5/5 strength and no  sensory deficits throughout.  Negative Malik.  No clonus detected.    She has not had any cervical spine imaging.    In conclusion, this is a 25-year-old female with history of headaches/neck pain and onset of paresthesias felt throughout the face and extremities.  Given she was found to be hyper-reflexic on exam, I recommend obtaining xrays and MRI cervical spine to rule out any spinal cord compression. If no cord compression is found, we may refer to general neurology for further assessment.  Follow up after imaging.      Visit Diagnosis:  Paresthesias  -     MRI Cervical Spine Without Contrast; Future; Expected date: 11/23/2020  -     X-Ray Cervical Spine 5 View W Flex Extxt; Future; Expected date: 11/23/2020    Neck pain  -     Ambulatory Consult to Back & Spine Clinic  -     MRI Cervical Spine Without Contrast; Future; Expected date: 11/23/2020  -     X-Ray Cervical Spine 5 View W Flex Extxt; Future; Expected date: 11/23/2020    Hyper reflexia  -     MRI Cervical Spine Without Contrast; Future; Expected date: 11/23/2020  -     X-Ray Cervical Spine 5 View W Flex Extxt; Future; Expected date: 11/23/2020        Total time spent counseling greater than fifty percent of total visit time.  Counseling included discussion regarding imaging findings, diagnosis possibilities, treatment options, risks and benefits.   The patient had many questions regarding the options and long-term effects.

## 2020-11-23 NOTE — TELEPHONE ENCOUNTER
----- Message from Cheryle Quintana sent at 11/18/2020 11:52 AM CST -----  Called pt to schedule with Dr Norwood.

## 2020-12-07 ENCOUNTER — TELEPHONE (OUTPATIENT)
Dept: GENETICS | Facility: CLINIC | Age: 25
End: 2020-12-07

## 2020-12-07 ENCOUNTER — TELEPHONE (OUTPATIENT)
Dept: SPINE | Facility: CLINIC | Age: 25
End: 2020-12-07

## 2020-12-07 NOTE — TELEPHONE ENCOUNTER
Called patient to speak with her about her appointment scheduled for tomorrow with Laurie Redmond, got voicemail, left return call message.

## 2020-12-08 ENCOUNTER — OFFICE VISIT (OUTPATIENT)
Dept: GENETICS | Facility: CLINIC | Age: 25
End: 2020-12-08
Payer: COMMERCIAL

## 2020-12-08 VITALS — BODY MASS INDEX: 38.41 KG/M2 | HEIGHT: 63 IN | WEIGHT: 216.81 LBS

## 2020-12-08 DIAGNOSIS — Q79.69 EHLERS-DANLOS SYNDROME, FAMILIAL JOINT LAXITY TYPE: ICD-10-CM

## 2020-12-08 DIAGNOSIS — M79.7 FIBROMYALGIA: Primary | ICD-10-CM

## 2020-12-08 DIAGNOSIS — G47.411 NARCOLEPSY AND CATAPLEXY: ICD-10-CM

## 2020-12-08 PROCEDURE — 96040 PR GENETIC COUNSELING, EACH 30 MIN: ICD-10-PCS | Mod: S$GLB,,, | Performed by: GENETIC COUNSELOR, MS

## 2020-12-08 PROCEDURE — 3008F BODY MASS INDEX DOCD: CPT | Mod: CPTII,S$GLB,, | Performed by: MEDICAL GENETICS

## 2020-12-08 PROCEDURE — 99205 PR OFFICE/OUTPT VISIT, NEW, LEVL V, 60-74 MIN: ICD-10-PCS | Mod: 25,S$GLB,, | Performed by: MEDICAL GENETICS

## 2020-12-08 PROCEDURE — 1126F PR PAIN SEVERITY QUANTIFIED, NO PAIN PRESENT: ICD-10-PCS | Mod: S$GLB,,, | Performed by: MEDICAL GENETICS

## 2020-12-08 PROCEDURE — 99205 OFFICE O/P NEW HI 60 MIN: CPT | Mod: 25,S$GLB,, | Performed by: MEDICAL GENETICS

## 2020-12-08 PROCEDURE — 99999 PR PBB SHADOW E&M-EST. PATIENT-LVL V: CPT | Mod: PBBFAC,,, | Performed by: MEDICAL GENETICS

## 2020-12-08 PROCEDURE — 1126F AMNT PAIN NOTED NONE PRSNT: CPT | Mod: S$GLB,,, | Performed by: MEDICAL GENETICS

## 2020-12-08 PROCEDURE — 3008F PR BODY MASS INDEX (BMI) DOCUMENTED: ICD-10-PCS | Mod: CPTII,S$GLB,, | Performed by: MEDICAL GENETICS

## 2020-12-08 PROCEDURE — 96040 PR GENETIC COUNSELING, EACH 30 MIN: CPT | Mod: S$GLB,,, | Performed by: GENETIC COUNSELOR, MS

## 2020-12-08 PROCEDURE — 99999 PR PBB SHADOW E&M-EST. PATIENT-LVL V: ICD-10-PCS | Mod: PBBFAC,,, | Performed by: MEDICAL GENETICS

## 2020-12-08 NOTE — PROGRESS NOTES
Renetta Durham         DOS: 22   : 97   MRN: [id number]      REFERRING MD: Rhonda Obando NP      REASON FOR CONSULT: [facility] was asked to evaluate this 25-year-old female for a possible connective tissue disorder. She presents unaccompanied for todays visit.      PRESENT ILLNESS: Ms. Durham has had joint hypermobility since childhood and was in gymnastics. Shes had joint subluxations but no dislocations requiring surgery or bracing. She does not report signs of skin fragility. She does have arthralgia especially in the hips, knees and back.    Ms. Durham also carries diagnoses of fibromyalgia, fatigue, migraines, and narcolepsy. These symptoms started around the time she had pneumonia at age 15. She started to have chronic pain and fatigue and she was diagnosed with narcolepsy shortly after (Vyvanse helps). She was diagnosed with fibromyalgia at 21. She also reports headaches and neck pain which are associated with numbness and tingling of the face and extremities which can last for hours. She has migraines 3-4x/ week. Other complains include brain fog, memory problems, generalized musculoskeletal pain, IBS and postural hypotension. Her cardiology evaluation is pending on 20.      PAST MEDICAL HISTORY:   Optic nerve edema  Intractable chronic migraine without aura and with status migrainosus  Medication overuse headache  Obstructive sleep apnea  Hyperreflexia  Raccoon bite  Fibromyalgia  Tobacco use disorder  Obesity (BMI 30-39.9)  TUTU (generalized anxiety disorder)  MDD (major depressive disorder), recurrent episode, mild  Metatarsus adductus, congenital  Foot joint pain  GERD (gastroesophageal reflux disease)  Narcolepsy and cataplexy  Anxiety  Asthma  ALLERGIC RHINITIS  Toxic effect of unspecified substance, chiefly nonmedicinal as to source(989.9)     MEDICATIONS:  almotriptan (AXERT) 12.5 MG tablet     citalopram (CELEXA) 20 MG tablet    cyclobenzaprine (FLEXERIL) 5 MG  tablet    fluticasone (FLONASE) 50 mcg/actuation nasal spray    galcanezumab-gnlm (EMGALITY PEN) 120 mg/mL PnIj    galcanezumab-gnlm 120 mg/mL PnIj    hydroCHLOROthiazide (HYDRODIURIL) 25 MG tablet    ibuprofen (ADVIL,MOTRIN) 600 MG tablet    meloxicam (MOBIC) 15 MG tablet    methylphenidate HCl 54 MG CR tablet    norgestimate-ethinyl estradioL (ORTHO-CYCLEN) 0.25-35 mg-mcg per tablet    ondansetron (ZOFRAN) 4 MG tablet    pantoprazole (PROTONIX) 40 MG tablet    prochlorperazine (COMPAZINE) 10 MG tablet    triamcinolone acetonide 0.1% (KENALOG) 0.1 % cream    gabapentin (NEURONTIN) 300 MG capsule ()     ALLERGIES: NKDA     FAMILY HISTORY: She has a 26-year-old paternal half-sister who was diagnosed with hypermobile EDS. We do not have these records. She also has POTS. She also has 40-year-old and 42-year-old paternal half-sisters in good health. Her 42-year-old sister had 4-5 miscarriages. She has healthy sons. She has a 40-year-old maternal half-sister with possible narcolepsy but no diagnosis. Her mother is 60 and has MS and RA. She is hypermobile. Her stomach was ripped open and had to be cauterized due to internal bleeding. Her father is 65 and has a possible heart problem. Her maternal aunt had breast cancer in her 60s. Her maternal grandfather may have also had narcolepsy. Ms. Durham does not have any children. A full three-generation pedigree was obtained and is scanned into the media tab.     PHYSICAL EXAM: Ht: 53.47, Wt: 216lbs 13.2oz, BMI: 37.8, she is morbidly obese  HEENT:  normocephalic head and no appreciable dysmorphic facial features. Normal palate.  NECK:  Supple.                                                               CHEST:  normally formed.  MUSCULOSKELETAL: There are no dysmorphic features in the hands or feet. The patient had 7 out of 9 points on the Beighton scale of hyperextensibility while more than 5 defines hypermobility.   SKIN:  She had some subcutaneous ecchymoses and  no evidence of smooth, velvety or translucent skin. There were no cutaneous stretch marks or atrophic scars. Her skin was not stretchy.  NEUROLOGIC: normal tone and strength, normal language and cognition. She appeared somewhat fatigued.    IMPRESSION: At this time, the patients physical exam, medical history and family history show some connective tissue laxity since she has 7 out of 9 points on the Beighton scale of hyperextensibility while more than 5 defines hypermobility. Denzel-Danlos syndrome hypermobility type (EDSH) is in a differential but its hard to say with certainty because theres no confirmatory genetic testing and theres another entity with significant overlap called benign joint hypermobility syndrome (BJHS) and theres no fine line between BJHS and EDSH. EDS has 13 types out of which 12 types have genes associated with them and can be tested for. Genetic basis for EDSH or BJHS is unknown and no gene can be tested at this time. BJHS may just be a normal variation of connective tissue laxity and can be very difficult to distinguish from EDSH as reported by Mitchell et al (2009). Overall 20% of population is hyperextensible.     While she has hyperextensibility she does not have history of tissue fragility, i.e. poor wound healing, thin translucent skin or atrophic scars that are more characteristic of EDS classic type (EDSC). Theres some overlap between EDSC and EDSH, with EDSC involving COL5A1 and COL5A2 gene mutations which has a 90% yield. Differential diagnosis of EDSH includes vascular EDS (EDSV) caused by mutations in the COL3A1 (98% yield). Genetic testing for EDSC and EDSV is available but typically has a low yield especially in BJHS or EDSH while variants of unknown significance can make diagnosis and treatment even more difficult. The patient decided not to proceed at this time.     Physical therapy is recommended for EDSH or BJHS and Paulina referred her to the comprehensive EDS clinic at  [facility] with physical therapists specializing in connective tissue disorders ([phone]). Paulina talked about the importance of diet and exercise. Paulina also referred the patient to the Functional Restoration Program (FRP) at [facility] which helped many of my patients with chronic pain and fatigue. [phone] [email or web address].    Ms Durham does have some phenotypic features of chronic fatigue syndrome/myalgic encephalomyelitis (CFS/ME) with possible secondary mitochondrial dysfunction which can accompany many disorders (Kulwant et al. 2016). CFS/ME which has no clearly defined etiology and it unfortunately afflicts many people with preponderance of females. Dysautonomia and fatigue are quite common as well as symptoms like brain fog and cognitive/memory impairment. Theres no genetic testing or definitive treatment. Paulina given the patient a lot of information to see if CFS/ME could be what she has.     RECOMMENDATIONS:   1. Consider CFS/ME (info given).  2. Referral to the comprehensive EDS clinic at [facility] with PT specializing in connective tissue disorders.  3. Referral to FRP  4. Cardiology evaluation pending on 12/14/20.  5. Follow up prn.    REFERENCE:  -  Kulwant ALMENDAREZ, Parvez R, Kahler S. Primary Mitochondrial Disease and Secondary Mitochondrial Dysfunction: Importance of Distinction for Diagnosis and Treatment. Mol Syndromol 2016 Jul;7(3):122-37; PMID: [id number].  -  https://www.nice.org.uk/guidance/cg53/chapter/1-Guidance  - Margarito ROBLEDO. Denzel-Danlos Syndrome, Hypermobility Type. 2004 Oct 22 [Updated 2018]. In: Terence RA, Eduardo MP, Alessio TD, et al., editors. GeneReviews [Internet]. [place] ([place]): [employer], [place]; 3849-7948. Available from: http://www.ncbi.nlm.nih.gov/books/VNG0531.  - Guidelines for Management of Joint Hypermobility Syndrome in Children and Young People. Allied Health Professionals Group of the [place] Society for Paediatric and Adolescent Rheumatology (BSPAR, 2012).  -  Mitchell et al. Hypermobile Denzel-Danlos syndrome (a.k.a. Denzel-Danlos syndrome Type III and Denzel-Danlos syndrome hypermobility type): Clinical description and natural history. Am J Med [place] C Semin Med [place]. 2017 Mar;175(1):48-69.    Time spent: 80 minutes direct contact, more than 50% counseling. The note is in epic.     Viktor Blum M.D.                                                                            Section Head - Medical Genetics                                                                                       [facility]                                                                                                Eula Rodriguez MPH, MS, [facility]  Certified Genetic Counselor  [facility]

## 2020-12-08 NOTE — PROGRESS NOTES
Renetta Durham         DOS: 2020   : 1995   MRN: [id number]     REFERRING MD: Rhonda Obando NP     REASON FOR CONSULT: Our [facility] was asked to evaluate this 25-year-old female for a possible connective tissue disorder. She presents unaccompanied for todays visit.     PRESENT ILLNESS: Ms. Durham is a 25-year-old female with hypermobility, fibromyalgia, fatigue, migraines, and narcolepsy. Her symptoms started around the time she had pneumonia at age 15. She started to have chronic pain and she was diagnosed with narcolepsy shortly after. She takes Vyvanse for narcolepsy which helps. She was diagnosed with fibromyalgia at 21. She is hypermobile. She has never had a dislocation but reports frequent subluxations in her ankles, wrists, shoulder, and knees. She has headaches and neck pain which are associated with numbness and tingling of the face and extremities. This can last for hours. She has migraines 3-4x/ week.     She reports easy bruising and slow wound healing, but normal appearing scars. She has a hiatal hernia. She has IBS and GERD. She has no history of organ prolapse.     There is no family history of major vascular complications.     PAST MEDICAL HISTORY:   Optic nerve edema  Intractable chronic migraine without aura and with status migrainosus  Medication overuse headache  Obstructive sleep apnea  Hyperreflexia  Raccoon bite  Fibromyalgia  Tobacco use disorder  Obesity (BMI 30-39.9)  TUTU (generalized anxiety disorder)  MDD (major depressive disorder), recurrent episode, mild  Metatarsus adductus, congenital  Foot joint pain  GERD (gastroesophageal reflux disease)  Narcolepsy and cataplexy  Anxiety  Asthma  ALLERGIC RHINITIS  Toxic effect of unspecified substance, chiefly nonmedicinal as to source(989.9)    MEDICATIONS:  ALLERGIES:    FAMILY HISTORY: Ms. Durham does not have any children. She has a 26-year-old paternal half-sister who was diagnosed with hypermobile EDS. We do not  have these records. She also has POTS. She also has 40-year-old and 42-year-old paternal half-sisters in good health. Her 42-year-old sister had 4-5 miscarriages. She has healthy sons. She has a 40-year-old maternal half-sister with possible narcolepsy but no diagnosis. Her mother is 60 and has MS and RA. She is hypermobile. Her stomach was ripped open and had to be cauterized due to internal bleeding. Her father is 65 and has a possible heart problem. Her maternal aunt had breast cancer in her 60s. Her maternal grandfather may have also had narcolepsy. A full three-generation pedigree was obtained and is scanned into the media tab.     PHYSICAL EXAM: Ht: 53.47, Wt: 216lbs 13.2oz, BMI: 37.85 kg/m2    IMPRESSION: Ms. Durham is a 25-year-old female with hypermobility, fibromyalgia, fatigue, migraines, and narcolepsy. Hypermobility is common in the general population and there is overlap between hypermobile Denzel-Danlos syndrome (hEDS) and benign joint hypermobility. A diagnosis of hEDS is made based off clinical criteria. Please see Dr. Centeno note for physical examination criteria.    She seems to fit a diagnosis of chronic fatigue syndrome/myalgic encephalomyelitis (CFS/ME), which is a chronic illness associated with fatigue, post-exertional malaise (PEM), problems with sleep, concentrating, pain, and dizziness. Underlying etiology for CFS/ME is currently unknown. Possible areas of study include infections, immune system changes, stress, changes in energy production, and genetics. There is no genetic testing for ME/CFS at this time, and a possible genetic component may be multifactorial, or a mix of genetic and environmental factors.    Please see Dr. Centeno note for physical exam information, medical management, and additional counseling.     RECOMMENDATIONS:  1. Please see Dr. Centeno note for recommendations     TIME SPENT: 30 minutes with over 50% spent counseling.     Eula Rodriguez, MPH, MS,  [facility]  Certified Genetic Counselor  [facility]     Viktor Blum M.D.                                                                            Section Head - Medical Genetics                                                                                       [facility]

## 2020-12-08 NOTE — LETTER
December 8, 2020      [place]  [address]  [facility] [address]           [facility]  [address]  [place] [address]  Phone: [phone]          Patient: Renetta Durham   MR Number: [id number]   YOB: 1995   Date of Visit: 12/8/2020       Dear Rhonda Obando:    Thank you for referring Renetta Durham to me for evaluation. Attached you will find relevant portions of my assessment and plan of care.    If you have questions, please do not hesitate to call me. I look forward to following Renetta Durham along with you.    Sincerely,    Viktor Blum MD    Enclosure  CC:  No Recipients    If you would like to receive this communication electronically, please contact [email or web address] or [phone] to request more information on [facility] access.    For providers and/or their staff who would like to refer a patient to [facility], please contact us through our one-stop-shop provider referral line, [facility], at [phone].    If you feel you have received this communication in error or would no longer like to receive these types of communications, please e-mail [email or web address]

## 2020-12-11 ENCOUNTER — TELEPHONE (OUTPATIENT)
Dept: PAIN MEDICINE | Facility: OTHER | Age: 25
End: 2020-12-11

## 2020-12-14 ENCOUNTER — CLINICAL SUPPORT (OUTPATIENT)
Dept: CARDIOLOGY | Facility: CLINIC | Age: 25
End: 2020-12-14
Attending: NURSE PRACTITIONER
Payer: COMMERCIAL

## 2020-12-14 VITALS — HEIGHT: 63 IN | WEIGHT: 216 LBS | BODY MASS INDEX: 38.27 KG/M2

## 2020-12-14 DIAGNOSIS — R06.09 DOE (DYSPNEA ON EXERTION): ICD-10-CM

## 2020-12-14 DIAGNOSIS — R00.2 PALPITATIONS: ICD-10-CM

## 2020-12-14 PROCEDURE — 99999 PR PBB SHADOW E&M-EST. PATIENT-LVL I: ICD-10-PCS | Mod: PBBFAC,,,

## 2020-12-14 PROCEDURE — 93306 ECHO (CUPID ONLY): ICD-10-PCS | Mod: S$GLB,,, | Performed by: INTERNAL MEDICINE

## 2020-12-14 PROCEDURE — 93306 TTE W/DOPPLER COMPLETE: CPT | Mod: S$GLB,,, | Performed by: INTERNAL MEDICINE

## 2020-12-14 PROCEDURE — 99999 PR PBB SHADOW E&M-EST. PATIENT-LVL I: CPT | Mod: PBBFAC,,,

## 2020-12-15 LAB
AV INDEX (PROSTH): 0.78
AV MEAN GRADIENT: 3 MMHG
AV PEAK GRADIENT: 5 MMHG
AV VALVE AREA: 2.93 CM2
AV VELOCITY RATIO: 0.74
BSA FOR ECHO PROCEDURE: 2.09 M2
CV ECHO LV RWT: 0.34 CM
DOP CALC AO PEAK VEL: 1.17 M/S
DOP CALC AO VTI: 25.79 CM
DOP CALC LVOT AREA: 3.7 CM2
DOP CALC LVOT DIAMETER: 2.18 CM
DOP CALC LVOT PEAK VEL: 0.87 M/S
DOP CALC LVOT STROKE VOLUME: 75.51 CM3
DOP CALCLVOT PEAK VEL VTI: 20.24 CM
E WAVE DECELERATION TIME: 138.38 MSEC
E/A RATIO: 2.07
E/E' RATIO: 6.2 M/S
ECHO LV POSTERIOR WALL: 0.8 CM (ref 0.6–1.1)
FRACTIONAL SHORTENING: 33 % (ref 28–44)
INTERVENTRICULAR SEPTUM: 0.82 CM (ref 0.6–1.1)
LA MAJOR: 4.43 CM
LA MINOR: 3.93 CM
LA WIDTH: 3.6 CM
LEFT ATRIUM SIZE: 3.15 CM
LEFT ATRIUM VOLUME INDEX: 20 ML/M2
LEFT ATRIUM VOLUME: 40.15 CM3
LEFT INTERNAL DIMENSION IN SYSTOLE: 3.2 CM (ref 2.1–4)
LEFT VENTRICLE DIASTOLIC VOLUME INDEX: 52.57 ML/M2
LEFT VENTRICLE DIASTOLIC VOLUME: 105.6 ML
LEFT VENTRICLE MASS INDEX: 63 G/M2
LEFT VENTRICLE SYSTOLIC VOLUME INDEX: 20.4 ML/M2
LEFT VENTRICLE SYSTOLIC VOLUME: 41 ML
LEFT VENTRICULAR INTERNAL DIMENSION IN DIASTOLE: 4.76 CM (ref 3.5–6)
LEFT VENTRICULAR MASS: 126.93 G
LV LATERAL E/E' RATIO: 5.47 M/S
LV SEPTAL E/E' RATIO: 7.15 M/S
MV A" WAVE DURATION": 10.28 MSEC
MV PEAK A VEL: 0.45 M/S
MV PEAK E VEL: 0.93 M/S
PULM VEIN S/D RATIO: 1
PV PEAK D VEL: 0.5 M/S
PV PEAK S VEL: 0.5 M/S
RA MAJOR: 4.16 CM
RA PRESSURE: 3 MMHG
RA WIDTH: 3.28 CM
RIGHT VENTRICULAR END-DIASTOLIC DIMENSION: 2.77 CM
RV TISSUE DOPPLER FREE WALL SYSTOLIC VELOCITY 1 (APICAL 4 CHAMBER VIEW): 11.72 CM/S
TDI LATERAL: 0.17 M/S
TDI SEPTAL: 0.13 M/S
TDI: 0.15 M/S
TRICUSPID ANNULAR PLANE SYSTOLIC EXCURSION: 2.24 CM

## 2020-12-16 DIAGNOSIS — G43.711 INTRACTABLE CHRONIC MIGRAINE WITHOUT AURA AND WITH STATUS MIGRAINOSUS: ICD-10-CM

## 2020-12-21 ENCOUNTER — HOSPITAL ENCOUNTER (OUTPATIENT)
Dept: RADIOLOGY | Facility: HOSPITAL | Age: 25
Discharge: HOME | End: 2020-12-21
Attending: PHYSICIAN ASSISTANT
Payer: COMMERCIAL

## 2020-12-21 ENCOUNTER — TELEPHONE (OUTPATIENT)
Dept: PAIN MEDICINE | Facility: OTHER | Age: 25
End: 2020-12-21

## 2020-12-21 DIAGNOSIS — R20.2 PARESTHESIAS: ICD-10-CM

## 2020-12-21 DIAGNOSIS — R29.2 HYPER REFLEXIA: ICD-10-CM

## 2020-12-21 DIAGNOSIS — M54.2 NECK PAIN: ICD-10-CM

## 2020-12-21 PROCEDURE — 72052 XR CERVICAL SPINE 5 VIEW WITH FLEX AND EXT: ICD-10-PCS | Mod: 26,,, | Performed by: RADIOLOGY

## 2020-12-21 PROCEDURE — 72052 X-RAY EXAM NECK SPINE 6/>VWS: CPT | Mod: TC,FY,PO

## 2020-12-21 PROCEDURE — 72052 X-RAY EXAM NECK SPINE 6/>VWS: CPT | Mod: 26,,, | Performed by: RADIOLOGY

## 2020-12-22 ENCOUNTER — HOSPITAL ENCOUNTER (OUTPATIENT)
Dept: RADIOLOGY | Facility: HOSPITAL | Age: 25
Discharge: HOME | End: 2020-12-22
Attending: PHYSICIAN ASSISTANT
Payer: COMMERCIAL

## 2020-12-22 DIAGNOSIS — R20.2 PARESTHESIAS: ICD-10-CM

## 2020-12-22 DIAGNOSIS — R29.2 HYPER REFLEXIA: ICD-10-CM

## 2020-12-22 DIAGNOSIS — M54.2 NECK PAIN: ICD-10-CM

## 2020-12-22 PROCEDURE — 72141 MRI CERVICAL SPINE WITHOUT CONTRAST: ICD-10-PCS | Mod: 26,,, | Performed by: RADIOLOGY

## 2020-12-22 PROCEDURE — 72141 MRI NECK SPINE W/O DYE: CPT | Mod: TC,PO

## 2020-12-22 PROCEDURE — 72141 MRI NECK SPINE W/O DYE: CPT | Mod: 26,,, | Performed by: RADIOLOGY

## 2020-12-30 RX ORDER — GABAPENTIN 300 MG/1
600 CAPSULE ORAL 2 TIMES DAILY
Qty: 120 CAPSULE | Refills: 11 | Status: SHIPPED | OUTPATIENT
Start: 2020-12-30 | End: 2021-02-03 | Stop reason: SDUPTHER

## 2021-01-04 ENCOUNTER — OFFICE VISIT (OUTPATIENT)
Dept: SPINE | Facility: CLINIC | Age: 26
End: 2021-01-04
Payer: COMMERCIAL

## 2021-01-04 VITALS
SYSTOLIC BLOOD PRESSURE: 108 MMHG | BODY MASS INDEX: 38.44 KG/M2 | HEIGHT: 63 IN | HEART RATE: 74 BPM | WEIGHT: 216.94 LBS | DIASTOLIC BLOOD PRESSURE: 74 MMHG

## 2021-01-04 DIAGNOSIS — R20.2 PARESTHESIAS: Primary | ICD-10-CM

## 2021-01-04 DIAGNOSIS — M54.2 NECK PAIN: ICD-10-CM

## 2021-01-04 DIAGNOSIS — M47.812 CERVICAL SPONDYLOSIS: ICD-10-CM

## 2021-01-04 PROCEDURE — 1125F AMNT PAIN NOTED PAIN PRSNT: CPT | Mod: S$GLB,,, | Performed by: PHYSICIAN ASSISTANT

## 2021-01-04 PROCEDURE — 99214 PR OFFICE/OUTPT VISIT, EST, LEVL IV, 30-39 MIN: ICD-10-PCS | Mod: S$GLB,,, | Performed by: PHYSICIAN ASSISTANT

## 2021-01-04 PROCEDURE — 99214 OFFICE O/P EST MOD 30 MIN: CPT | Mod: S$GLB,,, | Performed by: PHYSICIAN ASSISTANT

## 2021-01-04 PROCEDURE — 3008F PR BODY MASS INDEX (BMI) DOCUMENTED: ICD-10-PCS | Mod: CPTII,S$GLB,, | Performed by: PHYSICIAN ASSISTANT

## 2021-01-04 PROCEDURE — 1125F PR PAIN SEVERITY QUANTIFIED, PAIN PRESENT: ICD-10-PCS | Mod: S$GLB,,, | Performed by: PHYSICIAN ASSISTANT

## 2021-01-04 PROCEDURE — 99999 PR PBB SHADOW E&M-EST. PATIENT-LVL IV: ICD-10-PCS | Mod: PBBFAC,,, | Performed by: PHYSICIAN ASSISTANT

## 2021-01-04 PROCEDURE — 3008F BODY MASS INDEX DOCD: CPT | Mod: CPTII,S$GLB,, | Performed by: PHYSICIAN ASSISTANT

## 2021-01-04 PROCEDURE — 99999 PR PBB SHADOW E&M-EST. PATIENT-LVL IV: CPT | Mod: PBBFAC,,, | Performed by: PHYSICIAN ASSISTANT

## 2021-01-11 ENCOUNTER — OFFICE VISIT (OUTPATIENT)
Dept: FAMILY MEDICINE | Facility: CLINIC | Age: 26
End: 2021-01-11
Payer: COMMERCIAL

## 2021-01-11 VITALS
SYSTOLIC BLOOD PRESSURE: 114 MMHG | TEMPERATURE: 98 F | HEIGHT: 63 IN | DIASTOLIC BLOOD PRESSURE: 70 MMHG | HEART RATE: 86 BPM | RESPIRATION RATE: 18 BRPM | WEIGHT: 211.56 LBS | BODY MASS INDEX: 37.48 KG/M2

## 2021-01-11 DIAGNOSIS — R59.0 ANTERIOR CERVICAL LYMPHADENOPATHY: Primary | ICD-10-CM

## 2021-01-11 PROCEDURE — 99214 OFFICE O/P EST MOD 30 MIN: CPT | Mod: S$GLB,,, | Performed by: NURSE PRACTITIONER

## 2021-01-11 PROCEDURE — 3008F BODY MASS INDEX DOCD: CPT | Mod: CPTII,S$GLB,, | Performed by: NURSE PRACTITIONER

## 2021-01-11 PROCEDURE — 3008F PR BODY MASS INDEX (BMI) DOCUMENTED: ICD-10-PCS | Mod: CPTII,S$GLB,, | Performed by: NURSE PRACTITIONER

## 2021-01-11 PROCEDURE — 99214 PR OFFICE/OUTPT VISIT, EST, LEVL IV, 30-39 MIN: ICD-10-PCS | Mod: S$GLB,,, | Performed by: NURSE PRACTITIONER

## 2021-01-11 PROCEDURE — 1125F AMNT PAIN NOTED PAIN PRSNT: CPT | Mod: S$GLB,,, | Performed by: NURSE PRACTITIONER

## 2021-01-11 PROCEDURE — 1125F PR PAIN SEVERITY QUANTIFIED, PAIN PRESENT: ICD-10-PCS | Mod: S$GLB,,, | Performed by: NURSE PRACTITIONER

## 2021-01-11 RX ORDER — AMOXICILLIN AND CLAVULANATE POTASSIUM 875; 125 MG/1; MG/1
1 TABLET, FILM COATED ORAL 2 TIMES DAILY
Qty: 20 TABLET | Refills: 0 | Status: SHIPPED | OUTPATIENT
Start: 2021-01-11 | End: 2021-01-21

## 2021-02-03 ENCOUNTER — OFFICE VISIT (OUTPATIENT)
Dept: RHEUMATOLOGY | Facility: CLINIC | Age: 26
End: 2021-02-03
Payer: COMMERCIAL

## 2021-02-03 VITALS
WEIGHT: 217.25 LBS | HEIGHT: 63 IN | HEART RATE: 78 BPM | SYSTOLIC BLOOD PRESSURE: 128 MMHG | DIASTOLIC BLOOD PRESSURE: 80 MMHG | BODY MASS INDEX: 38.49 KG/M2

## 2021-02-03 DIAGNOSIS — F41.1 GAD (GENERALIZED ANXIETY DISORDER): ICD-10-CM

## 2021-02-03 DIAGNOSIS — G47.33 OBSTRUCTIVE SLEEP APNEA: ICD-10-CM

## 2021-02-03 DIAGNOSIS — G43.711 INTRACTABLE CHRONIC MIGRAINE WITHOUT AURA AND WITH STATUS MIGRAINOSUS: ICD-10-CM

## 2021-02-03 DIAGNOSIS — M79.7 FIBROMYALGIA: Primary | ICD-10-CM

## 2021-02-03 DIAGNOSIS — F33.0 MDD (MAJOR DEPRESSIVE DISORDER), RECURRENT EPISODE, MILD: ICD-10-CM

## 2021-02-03 PROCEDURE — 99214 PR OFFICE/OUTPT VISIT, EST, LEVL IV, 30-39 MIN: ICD-10-PCS | Mod: S$GLB,,, | Performed by: INTERNAL MEDICINE

## 2021-02-03 PROCEDURE — 1125F PR PAIN SEVERITY QUANTIFIED, PAIN PRESENT: ICD-10-PCS | Mod: S$GLB,,, | Performed by: INTERNAL MEDICINE

## 2021-02-03 PROCEDURE — 1125F AMNT PAIN NOTED PAIN PRSNT: CPT | Mod: S$GLB,,, | Performed by: INTERNAL MEDICINE

## 2021-02-03 PROCEDURE — 3008F BODY MASS INDEX DOCD: CPT | Mod: CPTII,S$GLB,, | Performed by: INTERNAL MEDICINE

## 2021-02-03 PROCEDURE — 99999 PR PBB SHADOW E&M-EST. PATIENT-LVL IV: ICD-10-PCS | Mod: PBBFAC,,, | Performed by: INTERNAL MEDICINE

## 2021-02-03 PROCEDURE — 99214 OFFICE O/P EST MOD 30 MIN: CPT | Mod: S$GLB,,, | Performed by: INTERNAL MEDICINE

## 2021-02-03 PROCEDURE — 3008F PR BODY MASS INDEX (BMI) DOCUMENTED: ICD-10-PCS | Mod: CPTII,S$GLB,, | Performed by: INTERNAL MEDICINE

## 2021-02-03 PROCEDURE — 99999 PR PBB SHADOW E&M-EST. PATIENT-LVL IV: CPT | Mod: PBBFAC,,, | Performed by: INTERNAL MEDICINE

## 2021-02-03 RX ORDER — GABAPENTIN 300 MG/1
CAPSULE ORAL
Qty: 90 CAPSULE | Refills: 11 | Status: SHIPPED | OUTPATIENT
Start: 2021-02-03 | End: 2021-08-09 | Stop reason: SDUPTHER

## 2021-02-03 RX ORDER — DULOXETIN HYDROCHLORIDE 30 MG/1
30 CAPSULE, DELAYED RELEASE ORAL DAILY
Qty: 30 CAPSULE | Refills: 5 | Status: SHIPPED | OUTPATIENT
Start: 2021-02-03 | End: 2021-08-12

## 2021-02-03 ASSESSMENT — ROUTINE ASSESSMENT OF PATIENT INDEX DATA (RAPID3)
PSYCHOLOGICAL DISTRESS SCORE: 2.2
TOTAL RAPID3 SCORE: 6.11
MDHAQ FUNCTION SCORE: 1.3
PATIENT GLOBAL ASSESSMENT SCORE: 5
PAIN SCORE: 9
FATIGUE SCORE: 2.2

## 2021-02-05 ENCOUNTER — TELEPHONE (OUTPATIENT)
Dept: FAMILY MEDICINE | Facility: CLINIC | Age: 26
End: 2021-02-05

## 2021-02-05 ENCOUNTER — TELEPHONE (OUTPATIENT)
Dept: OPHTHALMOLOGY | Facility: CLINIC | Age: 26
End: 2021-02-05

## 2021-02-05 DIAGNOSIS — M79.672 LEFT FOOT PAIN: Primary | ICD-10-CM

## 2021-02-19 ENCOUNTER — TELEPHONE (OUTPATIENT)
Dept: FAMILY MEDICINE | Facility: CLINIC | Age: 26
End: 2021-02-19

## 2021-02-19 RX ORDER — METHYLPHENIDATE HYDROCHLORIDE 54 MG/1
54 TABLET ORAL EVERY MORNING
Qty: 7 TABLET | Refills: 0 | Status: SHIPPED | OUTPATIENT
Start: 2021-02-19 | End: 2021-02-22 | Stop reason: SDUPTHER

## 2021-02-19 RX ORDER — METHYLPHENIDATE HYDROCHLORIDE 54 MG/1
54 TABLET ORAL EVERY MORNING
Qty: 30 TABLET | Refills: 0 | Status: CANCELLED | OUTPATIENT
Start: 2021-02-19

## 2021-02-22 ENCOUNTER — OFFICE VISIT (OUTPATIENT)
Dept: FAMILY MEDICINE | Facility: CLINIC | Age: 26
End: 2021-02-22
Payer: COMMERCIAL

## 2021-02-22 ENCOUNTER — OFFICE VISIT (OUTPATIENT)
Dept: PODIATRY | Facility: CLINIC | Age: 26
End: 2021-02-22
Payer: COMMERCIAL

## 2021-02-22 VITALS
HEIGHT: 63 IN | DIASTOLIC BLOOD PRESSURE: 78 MMHG | SYSTOLIC BLOOD PRESSURE: 110 MMHG | WEIGHT: 215.38 LBS | HEART RATE: 95 BPM | RESPIRATION RATE: 18 BRPM | BODY MASS INDEX: 38.16 KG/M2 | TEMPERATURE: 98 F

## 2021-02-22 VITALS — BODY MASS INDEX: 38.47 KG/M2 | WEIGHT: 217.13 LBS | HEIGHT: 63 IN

## 2021-02-22 DIAGNOSIS — G47.411 NARCOLEPSY AND CATAPLEXY: ICD-10-CM

## 2021-02-22 DIAGNOSIS — Q66.221 METATARSUS ADDUCTUS OF BOTH FEET: ICD-10-CM

## 2021-02-22 DIAGNOSIS — G57.63 MORTON'S NEUROMA OF BOTH FEET: Primary | ICD-10-CM

## 2021-02-22 DIAGNOSIS — M54.12 CERVICAL RADICULOPATHY: Primary | ICD-10-CM

## 2021-02-22 DIAGNOSIS — M25.80 SESAMOIDITIS: ICD-10-CM

## 2021-02-22 DIAGNOSIS — Q66.222 METATARSUS ADDUCTUS OF BOTH FEET: ICD-10-CM

## 2021-02-22 PROCEDURE — 99204 PR OFFICE/OUTPT VISIT, NEW, LEVL IV, 45-59 MIN: ICD-10-PCS | Mod: S$GLB,,, | Performed by: PODIATRIST

## 2021-02-22 PROCEDURE — 3008F BODY MASS INDEX DOCD: CPT | Mod: CPTII,S$GLB,, | Performed by: NURSE PRACTITIONER

## 2021-02-22 PROCEDURE — 3008F PR BODY MASS INDEX (BMI) DOCUMENTED: ICD-10-PCS | Mod: CPTII,S$GLB,, | Performed by: NURSE PRACTITIONER

## 2021-02-22 PROCEDURE — 99204 OFFICE O/P NEW MOD 45 MIN: CPT | Mod: S$GLB,,, | Performed by: PODIATRIST

## 2021-02-22 PROCEDURE — 1125F AMNT PAIN NOTED PAIN PRSNT: CPT | Mod: S$GLB,,, | Performed by: NURSE PRACTITIONER

## 2021-02-22 PROCEDURE — 99214 PR OFFICE/OUTPT VISIT, EST, LEVL IV, 30-39 MIN: ICD-10-PCS | Mod: S$GLB,,, | Performed by: NURSE PRACTITIONER

## 2021-02-22 PROCEDURE — 99214 OFFICE O/P EST MOD 30 MIN: CPT | Mod: S$GLB,,, | Performed by: NURSE PRACTITIONER

## 2021-02-22 PROCEDURE — 1126F AMNT PAIN NOTED NONE PRSNT: CPT | Mod: S$GLB,,, | Performed by: PODIATRIST

## 2021-02-22 PROCEDURE — 3008F BODY MASS INDEX DOCD: CPT | Mod: CPTII,S$GLB,, | Performed by: PODIATRIST

## 2021-02-22 PROCEDURE — 3008F PR BODY MASS INDEX (BMI) DOCUMENTED: ICD-10-PCS | Mod: CPTII,S$GLB,, | Performed by: PODIATRIST

## 2021-02-22 PROCEDURE — 1125F PR PAIN SEVERITY QUANTIFIED, PAIN PRESENT: ICD-10-PCS | Mod: S$GLB,,, | Performed by: NURSE PRACTITIONER

## 2021-02-22 PROCEDURE — 1126F PR PAIN SEVERITY QUANTIFIED, NO PAIN PRESENT: ICD-10-PCS | Mod: S$GLB,,, | Performed by: PODIATRIST

## 2021-02-22 PROCEDURE — 99999 PR PBB SHADOW E&M-EST. PATIENT-LVL III: ICD-10-PCS | Mod: PBBFAC,,, | Performed by: PODIATRIST

## 2021-02-22 PROCEDURE — 99999 PR PBB SHADOW E&M-EST. PATIENT-LVL III: CPT | Mod: PBBFAC,,, | Performed by: PODIATRIST

## 2021-02-22 RX ORDER — METHYLPHENIDATE HYDROCHLORIDE 54 MG/1
54 TABLET ORAL EVERY MORNING
Qty: 30 TABLET | Refills: 0 | Status: SHIPPED | OUTPATIENT
Start: 2021-04-22 | End: 2021-08-12

## 2021-02-22 RX ORDER — CYCLOBENZAPRINE HCL 10 MG
10 TABLET ORAL 3 TIMES DAILY PRN
Qty: 60 TABLET | Refills: 1 | Status: SHIPPED | OUTPATIENT
Start: 2021-02-22 | End: 2021-11-22

## 2021-02-22 RX ORDER — PREDNISONE 20 MG/1
40 TABLET ORAL DAILY
Qty: 20 TABLET | Refills: 0 | Status: SHIPPED | OUTPATIENT
Start: 2021-02-22 | End: 2021-03-04

## 2021-02-22 RX ORDER — METHYLPHENIDATE HYDROCHLORIDE 54 MG/1
54 TABLET ORAL EVERY MORNING
Qty: 30 TABLET | Refills: 0 | Status: SHIPPED | OUTPATIENT
Start: 2021-03-22 | End: 2021-08-12

## 2021-02-22 RX ORDER — METHYLPHENIDATE HYDROCHLORIDE 54 MG/1
54 TABLET ORAL EVERY MORNING
Qty: 30 TABLET | Refills: 0 | Status: SHIPPED | OUTPATIENT
Start: 2021-02-22 | End: 2021-08-12

## 2021-02-23 ENCOUNTER — TELEPHONE (OUTPATIENT)
Dept: FAMILY MEDICINE | Facility: CLINIC | Age: 26
End: 2021-02-23

## 2021-03-03 DIAGNOSIS — H47.10 OPTIC NERVE EDEMA: Primary | ICD-10-CM

## 2021-03-04 ENCOUNTER — OFFICE VISIT (OUTPATIENT)
Dept: OPHTHALMOLOGY | Facility: CLINIC | Age: 26
End: 2021-03-04
Payer: COMMERCIAL

## 2021-03-04 ENCOUNTER — OFFICE VISIT (OUTPATIENT)
Dept: FAMILY MEDICINE | Facility: CLINIC | Age: 26
End: 2021-03-04
Payer: COMMERCIAL

## 2021-03-04 ENCOUNTER — CLINICAL SUPPORT (OUTPATIENT)
Dept: OPHTHALMOLOGY | Facility: CLINIC | Age: 26
End: 2021-03-04
Payer: COMMERCIAL

## 2021-03-04 VITALS
HEART RATE: 81 BPM | RESPIRATION RATE: 18 BRPM | DIASTOLIC BLOOD PRESSURE: 78 MMHG | WEIGHT: 213.63 LBS | BODY MASS INDEX: 37.85 KG/M2 | TEMPERATURE: 98 F | HEIGHT: 63 IN | SYSTOLIC BLOOD PRESSURE: 116 MMHG

## 2021-03-04 DIAGNOSIS — G43.019 MIGRAINE WITHOUT AURA, INTRACTABLE, WITHOUT STATUS MIGRAINOSUS: ICD-10-CM

## 2021-03-04 DIAGNOSIS — S41.112A LACERATION OF LEFT UPPER EXTREMITY, INITIAL ENCOUNTER: Primary | ICD-10-CM

## 2021-03-04 PROCEDURE — 99213 OFFICE O/P EST LOW 20 MIN: CPT | Mod: S$GLB,,, | Performed by: NURSE PRACTITIONER

## 2021-03-04 PROCEDURE — 92014 PR EYE EXAM, EST PATIENT,COMPREHESV: ICD-10-PCS | Mod: S$GLB,,, | Performed by: OPHTHALMOLOGY

## 2021-03-04 PROCEDURE — 99213 PR OFFICE/OUTPT VISIT, EST, LEVL III, 20-29 MIN: ICD-10-PCS | Mod: S$GLB,,, | Performed by: NURSE PRACTITIONER

## 2021-03-04 PROCEDURE — 1125F AMNT PAIN NOTED PAIN PRSNT: CPT | Mod: S$GLB,,, | Performed by: OPHTHALMOLOGY

## 2021-03-04 PROCEDURE — 99999 PR PBB SHADOW E&M-EST. PATIENT-LVL IV: CPT | Mod: PBBFAC,,, | Performed by: OPHTHALMOLOGY

## 2021-03-04 PROCEDURE — 3008F BODY MASS INDEX DOCD: CPT | Mod: CPTII,S$GLB,, | Performed by: NURSE PRACTITIONER

## 2021-03-04 PROCEDURE — 1126F PR PAIN SEVERITY QUANTIFIED, NO PAIN PRESENT: ICD-10-PCS | Mod: S$GLB,,, | Performed by: NURSE PRACTITIONER

## 2021-03-04 PROCEDURE — 3008F PR BODY MASS INDEX (BMI) DOCUMENTED: ICD-10-PCS | Mod: CPTII,S$GLB,, | Performed by: NURSE PRACTITIONER

## 2021-03-04 PROCEDURE — 92014 COMPRE OPH EXAM EST PT 1/>: CPT | Mod: S$GLB,,, | Performed by: OPHTHALMOLOGY

## 2021-03-04 PROCEDURE — 1126F AMNT PAIN NOTED NONE PRSNT: CPT | Mod: S$GLB,,, | Performed by: NURSE PRACTITIONER

## 2021-03-04 PROCEDURE — 99999 PR PBB SHADOW E&M-EST. PATIENT-LVL IV: ICD-10-PCS | Mod: PBBFAC,,, | Performed by: OPHTHALMOLOGY

## 2021-03-04 PROCEDURE — 1125F PR PAIN SEVERITY QUANTIFIED, PAIN PRESENT: ICD-10-PCS | Mod: S$GLB,,, | Performed by: OPHTHALMOLOGY

## 2021-03-04 RX ORDER — CEPHALEXIN 500 MG/1
500 CAPSULE ORAL EVERY 12 HOURS
Qty: 14 CAPSULE | Refills: 0 | Status: SHIPPED | OUTPATIENT
Start: 2021-03-04 | End: 2021-03-11

## 2021-03-17 ENCOUNTER — OFFICE VISIT (OUTPATIENT)
Dept: FAMILY MEDICINE | Facility: CLINIC | Age: 26
End: 2021-03-17
Payer: COMMERCIAL

## 2021-03-17 VITALS
BODY MASS INDEX: 37.23 KG/M2 | TEMPERATURE: 98 F | RESPIRATION RATE: 16 BRPM | HEART RATE: 78 BPM | WEIGHT: 210.13 LBS | OXYGEN SATURATION: 98 % | SYSTOLIC BLOOD PRESSURE: 102 MMHG | DIASTOLIC BLOOD PRESSURE: 58 MMHG | HEIGHT: 63 IN

## 2021-03-17 DIAGNOSIS — F32.A DEPRESSION, UNSPECIFIED DEPRESSION TYPE: ICD-10-CM

## 2021-03-17 DIAGNOSIS — Z79.899 ENCOUNTER FOR LONG-TERM CURRENT USE OF MEDICATION: ICD-10-CM

## 2021-03-17 DIAGNOSIS — R09.89 THROAT CLEARING: ICD-10-CM

## 2021-03-17 DIAGNOSIS — E66.01 MORBID (SEVERE) OBESITY DUE TO EXCESS CALORIES: ICD-10-CM

## 2021-03-17 DIAGNOSIS — G47.30 SLEEP APNEA, UNSPECIFIED TYPE: ICD-10-CM

## 2021-03-17 DIAGNOSIS — R41.89 COGNITIVE IMPAIRMENT: Primary | ICD-10-CM

## 2021-03-17 PROCEDURE — 99214 PR OFFICE/OUTPT VISIT, EST, LEVL IV, 30-39 MIN: ICD-10-PCS | Mod: S$GLB,,, | Performed by: NURSE PRACTITIONER

## 2021-03-17 PROCEDURE — 3008F BODY MASS INDEX DOCD: CPT | Mod: CPTII,S$GLB,, | Performed by: NURSE PRACTITIONER

## 2021-03-17 PROCEDURE — 99214 OFFICE O/P EST MOD 30 MIN: CPT | Mod: S$GLB,,, | Performed by: NURSE PRACTITIONER

## 2021-03-17 PROCEDURE — 1125F PR PAIN SEVERITY QUANTIFIED, PAIN PRESENT: ICD-10-PCS | Mod: S$GLB,,, | Performed by: NURSE PRACTITIONER

## 2021-03-17 PROCEDURE — 3008F PR BODY MASS INDEX (BMI) DOCUMENTED: ICD-10-PCS | Mod: CPTII,S$GLB,, | Performed by: NURSE PRACTITIONER

## 2021-03-17 PROCEDURE — 1125F AMNT PAIN NOTED PAIN PRSNT: CPT | Mod: S$GLB,,, | Performed by: NURSE PRACTITIONER

## 2021-03-21 PROBLEM — E66.01 MORBID (SEVERE) OBESITY DUE TO EXCESS CALORIES: Status: ACTIVE | Noted: 2021-03-21

## 2021-03-30 ENCOUNTER — LAB VISIT (OUTPATIENT)
Dept: LAB | Facility: HOSPITAL | Age: 26
End: 2021-03-30
Attending: NURSE PRACTITIONER
Payer: COMMERCIAL

## 2021-03-30 ENCOUNTER — OFFICE VISIT (OUTPATIENT)
Dept: OTOLARYNGOLOGY | Facility: CLINIC | Age: 26
End: 2021-03-30
Payer: COMMERCIAL

## 2021-03-30 VITALS — BODY MASS INDEX: 37.03 KG/M2 | WEIGHT: 209 LBS | HEIGHT: 63 IN

## 2021-03-30 DIAGNOSIS — R51.9 CHRONIC FACE PAIN: ICD-10-CM

## 2021-03-30 DIAGNOSIS — Z79.899 ENCOUNTER FOR LONG-TERM CURRENT USE OF MEDICATION: ICD-10-CM

## 2021-03-30 DIAGNOSIS — R09.A2 GLOBUS SENSATION: ICD-10-CM

## 2021-03-30 DIAGNOSIS — R51.9 CHRONIC DAILY HEADACHE: Primary | ICD-10-CM

## 2021-03-30 DIAGNOSIS — R09.89 THROAT CLEARING: ICD-10-CM

## 2021-03-30 DIAGNOSIS — R13.10 DYSPHAGIA, UNSPECIFIED TYPE: ICD-10-CM

## 2021-03-30 DIAGNOSIS — G89.29 CHRONIC FACE PAIN: ICD-10-CM

## 2021-03-30 DIAGNOSIS — R05.3 CHRONIC COUGH: ICD-10-CM

## 2021-03-30 DIAGNOSIS — R09.89 CHOKING SENSATION: ICD-10-CM

## 2021-03-30 DIAGNOSIS — R49.0 DYSPHONIA: ICD-10-CM

## 2021-03-30 DIAGNOSIS — R07.0 THROAT DISCOMFORT: ICD-10-CM

## 2021-03-30 DIAGNOSIS — J32.8 OTHER CHRONIC SINUSITIS: ICD-10-CM

## 2021-03-30 LAB
ALBUMIN SERPL BCP-MCNC: 3.6 G/DL (ref 3.5–5.2)
ALP SERPL-CCNC: 42 U/L (ref 55–135)
ALT SERPL W/O P-5'-P-CCNC: 8 U/L (ref 10–44)
ANION GAP SERPL CALC-SCNC: 7 MMOL/L (ref 8–16)
AST SERPL-CCNC: 11 U/L (ref 10–40)
BASOPHILS # BLD AUTO: 0.02 K/UL (ref 0–0.2)
BASOPHILS NFR BLD: 0.3 % (ref 0–1.9)
BILIRUB SERPL-MCNC: 0.3 MG/DL (ref 0.1–1)
BUN SERPL-MCNC: 7 MG/DL (ref 6–20)
CALCIUM SERPL-MCNC: 9.2 MG/DL (ref 8.7–10.5)
CHLORIDE SERPL-SCNC: 108 MMOL/L (ref 95–110)
CHOLEST SERPL-MCNC: 199 MG/DL (ref 120–199)
CHOLEST/HDLC SERPL: 4 {RATIO} (ref 2–5)
CO2 SERPL-SCNC: 25 MMOL/L (ref 23–29)
CREAT SERPL-MCNC: 0.8 MG/DL (ref 0.5–1.4)
DIFFERENTIAL METHOD: NORMAL
EOSINOPHIL # BLD AUTO: 0.2 K/UL (ref 0–0.5)
EOSINOPHIL NFR BLD: 3.4 % (ref 0–8)
ERYTHROCYTE [DISTWIDTH] IN BLOOD BY AUTOMATED COUNT: 12.8 % (ref 11.5–14.5)
EST. GFR  (AFRICAN AMERICAN): >60 ML/MIN/1.73 M^2
EST. GFR  (NON AFRICAN AMERICAN): >60 ML/MIN/1.73 M^2
ESTIMATED AVG GLUCOSE: 105 MG/DL (ref 68–131)
GLUCOSE SERPL-MCNC: 101 MG/DL (ref 70–110)
HBA1C MFR BLD: 5.3 % (ref 4–5.6)
HCT VFR BLD AUTO: 42.9 % (ref 37–48.5)
HDLC SERPL-MCNC: 50 MG/DL (ref 40–75)
HDLC SERPL: 25.1 % (ref 20–50)
HGB BLD-MCNC: 14.1 G/DL (ref 12–16)
IMM GRANULOCYTES # BLD AUTO: 0.01 K/UL (ref 0–0.04)
IMM GRANULOCYTES NFR BLD AUTO: 0.2 % (ref 0–0.5)
LDLC SERPL CALC-MCNC: 134.8 MG/DL (ref 63–159)
LYMPHOCYTES # BLD AUTO: 2.6 K/UL (ref 1–4.8)
LYMPHOCYTES NFR BLD: 40 % (ref 18–48)
MCH RBC QN AUTO: 29.7 PG (ref 27–31)
MCHC RBC AUTO-ENTMCNC: 32.9 G/DL (ref 32–36)
MCV RBC AUTO: 91 FL (ref 82–98)
MONOCYTES # BLD AUTO: 0.4 K/UL (ref 0.3–1)
MONOCYTES NFR BLD: 6.7 % (ref 4–15)
NEUTROPHILS # BLD AUTO: 3.2 K/UL (ref 1.8–7.7)
NEUTROPHILS NFR BLD: 49.4 % (ref 38–73)
NONHDLC SERPL-MCNC: 149 MG/DL
NRBC BLD-RTO: 0 /100 WBC
PLATELET # BLD AUTO: 255 K/UL (ref 150–450)
PMV BLD AUTO: 11.2 FL (ref 9.2–12.9)
POTASSIUM SERPL-SCNC: 4.4 MMOL/L (ref 3.5–5.1)
PROT SERPL-MCNC: 6.9 G/DL (ref 6–8.4)
RBC # BLD AUTO: 4.74 M/UL (ref 4–5.4)
SODIUM SERPL-SCNC: 140 MMOL/L (ref 136–145)
TRIGL SERPL-MCNC: 71 MG/DL (ref 30–150)
TSH SERPL DL<=0.005 MIU/L-ACNC: 1.37 UIU/ML (ref 0.4–4)
WBC # BLD AUTO: 6.42 K/UL (ref 3.9–12.7)

## 2021-03-30 PROCEDURE — 85025 COMPLETE CBC W/AUTO DIFF WBC: CPT | Performed by: NURSE PRACTITIONER

## 2021-03-30 PROCEDURE — 1126F PR PAIN SEVERITY QUANTIFIED, NO PAIN PRESENT: ICD-10-PCS | Mod: S$GLB,,, | Performed by: NURSE PRACTITIONER

## 2021-03-30 PROCEDURE — 99215 OFFICE O/P EST HI 40 MIN: CPT | Mod: 25,S$GLB,, | Performed by: NURSE PRACTITIONER

## 2021-03-30 PROCEDURE — 99999 PR PBB SHADOW E&M-EST. PATIENT-LVL V: ICD-10-PCS | Mod: PBBFAC,,, | Performed by: NURSE PRACTITIONER

## 2021-03-30 PROCEDURE — 83036 HEMOGLOBIN GLYCOSYLATED A1C: CPT | Performed by: NURSE PRACTITIONER

## 2021-03-30 PROCEDURE — 99999 PR PBB SHADOW E&M-EST. PATIENT-LVL V: CPT | Mod: PBBFAC,,, | Performed by: NURSE PRACTITIONER

## 2021-03-30 PROCEDURE — 1126F AMNT PAIN NOTED NONE PRSNT: CPT | Mod: S$GLB,,, | Performed by: NURSE PRACTITIONER

## 2021-03-30 PROCEDURE — 36415 COLL VENOUS BLD VENIPUNCTURE: CPT | Mod: PO | Performed by: NURSE PRACTITIONER

## 2021-03-30 PROCEDURE — 99215 PR OFFICE/OUTPT VISIT, EST, LEVL V, 40-54 MIN: ICD-10-PCS | Mod: 25,S$GLB,, | Performed by: NURSE PRACTITIONER

## 2021-03-30 PROCEDURE — 3008F BODY MASS INDEX DOCD: CPT | Mod: CPTII,S$GLB,, | Performed by: NURSE PRACTITIONER

## 2021-03-30 PROCEDURE — 31575 DIAGNOSTIC LARYNGOSCOPY: CPT | Mod: S$GLB,,, | Performed by: NURSE PRACTITIONER

## 2021-03-30 PROCEDURE — 80061 LIPID PANEL: CPT | Performed by: NURSE PRACTITIONER

## 2021-03-30 PROCEDURE — 84443 ASSAY THYROID STIM HORMONE: CPT | Performed by: NURSE PRACTITIONER

## 2021-03-30 PROCEDURE — 3008F PR BODY MASS INDEX (BMI) DOCUMENTED: ICD-10-PCS | Mod: CPTII,S$GLB,, | Performed by: NURSE PRACTITIONER

## 2021-03-30 PROCEDURE — 31575 PR LARYNGOSCOPY, FLEXIBLE; DIAGNOSTIC: ICD-10-PCS | Mod: S$GLB,,, | Performed by: NURSE PRACTITIONER

## 2021-03-30 PROCEDURE — 80053 COMPREHEN METABOLIC PANEL: CPT | Performed by: NURSE PRACTITIONER

## 2021-04-05 ENCOUNTER — HOSPITAL ENCOUNTER (OUTPATIENT)
Dept: RADIOLOGY | Facility: HOSPITAL | Age: 26
Discharge: HOME | End: 2021-04-05
Attending: NURSE PRACTITIONER
Payer: COMMERCIAL

## 2021-04-05 DIAGNOSIS — R51.9 CHRONIC DAILY HEADACHE: ICD-10-CM

## 2021-04-05 DIAGNOSIS — G89.29 CHRONIC FACE PAIN: ICD-10-CM

## 2021-04-05 DIAGNOSIS — R51.9 CHRONIC FACE PAIN: ICD-10-CM

## 2021-04-05 DIAGNOSIS — J32.8 OTHER CHRONIC SINUSITIS: ICD-10-CM

## 2021-04-05 PROCEDURE — 70486 CT MAXILLOFACIAL W/O DYE: CPT | Mod: TC,PO

## 2021-04-05 PROCEDURE — 70486 CT SINUSES WITHOUT CONTRAST: ICD-10-PCS | Mod: 26,,, | Performed by: RADIOLOGY

## 2021-04-05 PROCEDURE — 70486 CT MAXILLOFACIAL W/O DYE: CPT | Mod: 26,,, | Performed by: RADIOLOGY

## 2021-04-26 ENCOUNTER — HOSPITAL ENCOUNTER (OUTPATIENT)
Dept: RADIOLOGY | Facility: HOSPITAL | Age: 26
Discharge: HOME | End: 2021-04-26
Attending: ORTHOPAEDIC SURGERY
Payer: COMMERCIAL

## 2021-04-26 ENCOUNTER — OFFICE VISIT (OUTPATIENT)
Dept: ORTHOPEDICS | Facility: CLINIC | Age: 26
End: 2021-04-26
Payer: COMMERCIAL

## 2021-04-26 VITALS
DIASTOLIC BLOOD PRESSURE: 75 MMHG | BODY MASS INDEX: 38.54 KG/M2 | HEIGHT: 62 IN | SYSTOLIC BLOOD PRESSURE: 122 MMHG | HEART RATE: 80 BPM | WEIGHT: 209.44 LBS

## 2021-04-26 DIAGNOSIS — M79.672 LEFT FOOT PAIN: Primary | ICD-10-CM

## 2021-04-26 DIAGNOSIS — M79.672 LEFT FOOT PAIN: ICD-10-CM

## 2021-04-26 DIAGNOSIS — S92.355A CLOSED NONDISPLACED FRACTURE OF FIFTH METATARSAL BONE OF LEFT FOOT, INITIAL ENCOUNTER: Primary | ICD-10-CM

## 2021-04-26 PROCEDURE — 73630 X-RAY EXAM OF FOOT: CPT | Mod: 26,LT,, | Performed by: RADIOLOGY

## 2021-04-26 PROCEDURE — 3008F BODY MASS INDEX DOCD: CPT | Mod: CPTII,S$GLB,, | Performed by: ORTHOPAEDIC SURGERY

## 2021-04-26 PROCEDURE — 73630 X-RAY EXAM OF FOOT: CPT | Mod: TC,PO,LT

## 2021-04-26 PROCEDURE — 99999 PR PBB SHADOW E&M-EST. PATIENT-LVL IV: ICD-10-PCS | Mod: PBBFAC,,, | Performed by: ORTHOPAEDIC SURGERY

## 2021-04-26 PROCEDURE — 28470 PR CLOSED RX METATARSAL FX: ICD-10-PCS | Mod: LT,S$GLB,, | Performed by: ORTHOPAEDIC SURGERY

## 2021-04-26 PROCEDURE — 1125F AMNT PAIN NOTED PAIN PRSNT: CPT | Mod: S$GLB,,, | Performed by: ORTHOPAEDIC SURGERY

## 2021-04-26 PROCEDURE — 99203 PR OFFICE/OUTPT VISIT, NEW, LEVL III, 30-44 MIN: ICD-10-PCS | Mod: 57,S$GLB,, | Performed by: ORTHOPAEDIC SURGERY

## 2021-04-26 PROCEDURE — 99999 PR PBB SHADOW E&M-EST. PATIENT-LVL IV: CPT | Mod: PBBFAC,,, | Performed by: ORTHOPAEDIC SURGERY

## 2021-04-26 PROCEDURE — 28470 CLTX METATARSAL FX WO MNP EA: CPT | Mod: LT,S$GLB,, | Performed by: ORTHOPAEDIC SURGERY

## 2021-04-26 PROCEDURE — 1125F PR PAIN SEVERITY QUANTIFIED, PAIN PRESENT: ICD-10-PCS | Mod: S$GLB,,, | Performed by: ORTHOPAEDIC SURGERY

## 2021-04-26 PROCEDURE — 3008F PR BODY MASS INDEX (BMI) DOCUMENTED: ICD-10-PCS | Mod: CPTII,S$GLB,, | Performed by: ORTHOPAEDIC SURGERY

## 2021-04-26 PROCEDURE — 73630 XR FOOT COMPLETE 3 VIEW LEFT: ICD-10-PCS | Mod: 26,LT,, | Performed by: RADIOLOGY

## 2021-04-26 PROCEDURE — 99203 OFFICE O/P NEW LOW 30 MIN: CPT | Mod: 57,S$GLB,, | Performed by: ORTHOPAEDIC SURGERY

## 2021-04-29 PROBLEM — S92.355A CLOSED NONDISPLACED FRACTURE OF FIFTH LEFT METATARSAL BONE: Status: ACTIVE | Noted: 2021-04-29

## 2021-05-12 DIAGNOSIS — M79.672 LEFT FOOT PAIN: Primary | ICD-10-CM

## 2021-05-18 ENCOUNTER — OFFICE VISIT (OUTPATIENT)
Dept: ORTHOPEDICS | Facility: CLINIC | Age: 26
End: 2021-05-18
Payer: COMMERCIAL

## 2021-05-18 ENCOUNTER — HOSPITAL ENCOUNTER (OUTPATIENT)
Dept: RADIOLOGY | Facility: HOSPITAL | Age: 26
Discharge: HOME | End: 2021-05-18
Attending: ORTHOPAEDIC SURGERY
Payer: COMMERCIAL

## 2021-05-18 VITALS — BODY MASS INDEX: 37.11 KG/M2 | WEIGHT: 209.44 LBS | HEIGHT: 63 IN

## 2021-05-18 DIAGNOSIS — M79.672 LEFT FOOT PAIN: ICD-10-CM

## 2021-05-18 DIAGNOSIS — S92.355D CLOSED NONDISPLACED FRACTURE OF FIFTH METATARSAL BONE OF LEFT FOOT WITH ROUTINE HEALING, SUBSEQUENT ENCOUNTER: Primary | ICD-10-CM

## 2021-05-18 PROCEDURE — 73630 XR FOOT COMPLETE 3 VIEW LEFT: ICD-10-PCS | Mod: 26,LT,, | Performed by: RADIOLOGY

## 2021-05-18 PROCEDURE — 1125F AMNT PAIN NOTED PAIN PRSNT: CPT | Mod: S$GLB,,, | Performed by: ORTHOPAEDIC SURGERY

## 2021-05-18 PROCEDURE — 99024 PR POST-OP FOLLOW-UP VISIT: ICD-10-PCS | Mod: S$GLB,,, | Performed by: ORTHOPAEDIC SURGERY

## 2021-05-18 PROCEDURE — 73630 X-RAY EXAM OF FOOT: CPT | Mod: 26,LT,, | Performed by: RADIOLOGY

## 2021-05-18 PROCEDURE — 3008F PR BODY MASS INDEX (BMI) DOCUMENTED: ICD-10-PCS | Mod: CPTII,S$GLB,, | Performed by: ORTHOPAEDIC SURGERY

## 2021-05-18 PROCEDURE — 73630 X-RAY EXAM OF FOOT: CPT | Mod: TC,PO,LT

## 2021-05-18 PROCEDURE — 3008F BODY MASS INDEX DOCD: CPT | Mod: CPTII,S$GLB,, | Performed by: ORTHOPAEDIC SURGERY

## 2021-05-18 PROCEDURE — 1125F PR PAIN SEVERITY QUANTIFIED, PAIN PRESENT: ICD-10-PCS | Mod: S$GLB,,, | Performed by: ORTHOPAEDIC SURGERY

## 2021-05-18 PROCEDURE — 99999 PR PBB SHADOW E&M-EST. PATIENT-LVL III: ICD-10-PCS | Mod: PBBFAC,,, | Performed by: ORTHOPAEDIC SURGERY

## 2021-05-18 PROCEDURE — 99999 PR PBB SHADOW E&M-EST. PATIENT-LVL III: CPT | Mod: PBBFAC,,, | Performed by: ORTHOPAEDIC SURGERY

## 2021-05-18 PROCEDURE — 99024 POSTOP FOLLOW-UP VISIT: CPT | Mod: S$GLB,,, | Performed by: ORTHOPAEDIC SURGERY

## 2021-05-26 ENCOUNTER — OFFICE VISIT (OUTPATIENT)
Dept: NEUROLOGY | Facility: CLINIC | Age: 26
End: 2021-05-26
Payer: COMMERCIAL

## 2021-05-26 VITALS
DIASTOLIC BLOOD PRESSURE: 89 MMHG | RESPIRATION RATE: 17 BRPM | BODY MASS INDEX: 38.93 KG/M2 | HEART RATE: 82 BPM | WEIGHT: 211.56 LBS | SYSTOLIC BLOOD PRESSURE: 129 MMHG | HEIGHT: 62 IN | TEMPERATURE: 99 F

## 2021-05-26 DIAGNOSIS — G43.711 INTRACTABLE CHRONIC MIGRAINE WITHOUT AURA AND WITH STATUS MIGRAINOSUS: Primary | ICD-10-CM

## 2021-05-26 PROCEDURE — 99214 PR OFFICE/OUTPT VISIT, EST, LEVL IV, 30-39 MIN: ICD-10-PCS | Mod: S$GLB,,, | Performed by: NURSE PRACTITIONER

## 2021-05-26 PROCEDURE — 3008F PR BODY MASS INDEX (BMI) DOCUMENTED: ICD-10-PCS | Mod: CPTII,S$GLB,, | Performed by: NURSE PRACTITIONER

## 2021-05-26 PROCEDURE — 99214 OFFICE O/P EST MOD 30 MIN: CPT | Mod: S$GLB,,, | Performed by: NURSE PRACTITIONER

## 2021-05-26 PROCEDURE — 99999 PR PBB SHADOW E&M-EST. PATIENT-LVL V: CPT | Mod: PBBFAC,,, | Performed by: NURSE PRACTITIONER

## 2021-05-26 PROCEDURE — 99999 PR PBB SHADOW E&M-EST. PATIENT-LVL V: ICD-10-PCS | Mod: PBBFAC,,, | Performed by: NURSE PRACTITIONER

## 2021-05-26 PROCEDURE — 1125F AMNT PAIN NOTED PAIN PRSNT: CPT | Mod: S$GLB,,, | Performed by: NURSE PRACTITIONER

## 2021-05-26 PROCEDURE — 3008F BODY MASS INDEX DOCD: CPT | Mod: CPTII,S$GLB,, | Performed by: NURSE PRACTITIONER

## 2021-05-26 PROCEDURE — 1125F PR PAIN SEVERITY QUANTIFIED, PAIN PRESENT: ICD-10-PCS | Mod: S$GLB,,, | Performed by: NURSE PRACTITIONER

## 2021-05-26 RX ORDER — PREDNISONE 10 MG/1
TABLET ORAL
Qty: 20 TABLET | Refills: 0 | Status: SHIPPED | OUTPATIENT
Start: 2021-05-26 | End: 2021-06-03

## 2021-05-26 RX ORDER — RIMEGEPANT SULFATE 75 MG/75MG
75 TABLET, ORALLY DISINTEGRATING ORAL DAILY PRN
Qty: 8 TABLET | Refills: 11 | Status: SHIPPED | OUTPATIENT
Start: 2021-05-26

## 2021-05-27 ENCOUNTER — TELEPHONE (OUTPATIENT)
Dept: PHARMACY | Facility: CLINIC | Age: 26
End: 2021-05-27

## 2021-06-09 DIAGNOSIS — S92.355D CLOSED NONDISPLACED FRACTURE OF FIFTH METATARSAL BONE OF LEFT FOOT WITH ROUTINE HEALING, SUBSEQUENT ENCOUNTER: Primary | ICD-10-CM

## 2021-06-15 ENCOUNTER — PROCEDURE VISIT (OUTPATIENT)
Dept: NEUROLOGY | Facility: CLINIC | Age: 26
End: 2021-06-15
Payer: COMMERCIAL

## 2021-06-15 VITALS
TEMPERATURE: 97 F | WEIGHT: 216.19 LBS | HEIGHT: 62 IN | HEART RATE: 83 BPM | RESPIRATION RATE: 17 BRPM | BODY MASS INDEX: 39.78 KG/M2 | SYSTOLIC BLOOD PRESSURE: 134 MMHG | DIASTOLIC BLOOD PRESSURE: 88 MMHG

## 2021-06-15 DIAGNOSIS — G43.711 INTRACTABLE CHRONIC MIGRAINE WITHOUT AURA AND WITH STATUS MIGRAINOSUS: Primary | ICD-10-CM

## 2021-06-15 PROCEDURE — 64615 CHEMODENERV MUSC MIGRAINE: CPT | Mod: S$GLB,,, | Performed by: NURSE PRACTITIONER

## 2021-06-15 PROCEDURE — 64615 PR CHEMODENERVATION OF MUSCLE FOR CHRONIC MIGRAINE: ICD-10-PCS | Mod: S$GLB,,, | Performed by: NURSE PRACTITIONER

## 2021-06-21 ENCOUNTER — OFFICE VISIT (OUTPATIENT)
Dept: ORTHOPEDICS | Facility: CLINIC | Age: 26
End: 2021-06-21
Payer: COMMERCIAL

## 2021-06-21 ENCOUNTER — HOSPITAL ENCOUNTER (OUTPATIENT)
Dept: RADIOLOGY | Facility: HOSPITAL | Age: 26
Discharge: HOME | End: 2021-06-21
Attending: ORTHOPAEDIC SURGERY
Payer: COMMERCIAL

## 2021-06-21 VITALS
DIASTOLIC BLOOD PRESSURE: 76 MMHG | BODY MASS INDEX: 39.79 KG/M2 | HEIGHT: 62 IN | SYSTOLIC BLOOD PRESSURE: 113 MMHG | HEART RATE: 78 BPM | WEIGHT: 216.25 LBS

## 2021-06-21 DIAGNOSIS — S92.355D CLOSED NONDISPLACED FRACTURE OF FIFTH METATARSAL BONE OF LEFT FOOT WITH ROUTINE HEALING, SUBSEQUENT ENCOUNTER: ICD-10-CM

## 2021-06-21 DIAGNOSIS — S92.355D CLOSED NONDISPLACED FRACTURE OF FIFTH METATARSAL BONE OF LEFT FOOT WITH ROUTINE HEALING, SUBSEQUENT ENCOUNTER: Primary | ICD-10-CM

## 2021-06-21 PROCEDURE — 3008F BODY MASS INDEX DOCD: CPT | Mod: CPTII,S$GLB,, | Performed by: ORTHOPAEDIC SURGERY

## 2021-06-21 PROCEDURE — 1125F PR PAIN SEVERITY QUANTIFIED, PAIN PRESENT: ICD-10-PCS | Mod: S$GLB,,, | Performed by: ORTHOPAEDIC SURGERY

## 2021-06-21 PROCEDURE — 73630 X-RAY EXAM OF FOOT: CPT | Mod: 26,LT,, | Performed by: RADIOLOGY

## 2021-06-21 PROCEDURE — 99024 PR POST-OP FOLLOW-UP VISIT: ICD-10-PCS | Mod: S$GLB,,, | Performed by: ORTHOPAEDIC SURGERY

## 2021-06-21 PROCEDURE — 1125F AMNT PAIN NOTED PAIN PRSNT: CPT | Mod: S$GLB,,, | Performed by: ORTHOPAEDIC SURGERY

## 2021-06-21 PROCEDURE — 73630 XR FOOT COMPLETE 3 VIEW LEFT: ICD-10-PCS | Mod: 26,LT,, | Performed by: RADIOLOGY

## 2021-06-21 PROCEDURE — 99999 PR PBB SHADOW E&M-EST. PATIENT-LVL IV: CPT | Mod: PBBFAC,,, | Performed by: ORTHOPAEDIC SURGERY

## 2021-06-21 PROCEDURE — 99024 POSTOP FOLLOW-UP VISIT: CPT | Mod: S$GLB,,, | Performed by: ORTHOPAEDIC SURGERY

## 2021-06-21 PROCEDURE — 73630 X-RAY EXAM OF FOOT: CPT | Mod: TC,PO,LT

## 2021-06-21 PROCEDURE — 99999 PR PBB SHADOW E&M-EST. PATIENT-LVL IV: ICD-10-PCS | Mod: PBBFAC,,, | Performed by: ORTHOPAEDIC SURGERY

## 2021-06-21 PROCEDURE — 3008F PR BODY MASS INDEX (BMI) DOCUMENTED: ICD-10-PCS | Mod: CPTII,S$GLB,, | Performed by: ORTHOPAEDIC SURGERY

## 2021-08-03 ENCOUNTER — PATIENT MESSAGE (OUTPATIENT)
Dept: RHEUMATOLOGY | Facility: CLINIC | Age: 26
End: 2021-08-03

## 2021-08-09 ENCOUNTER — OFFICE VISIT (OUTPATIENT)
Dept: RHEUMATOLOGY | Facility: CLINIC | Age: 26
End: 2021-08-09
Payer: COMMERCIAL

## 2021-08-09 ENCOUNTER — PATIENT MESSAGE (OUTPATIENT)
Dept: RHEUMATOLOGY | Facility: CLINIC | Age: 26
End: 2021-08-09

## 2021-08-09 DIAGNOSIS — M79.7 FIBROMYALGIA: Primary | ICD-10-CM

## 2021-08-09 DIAGNOSIS — M47.812 CERVICAL SPONDYLOSIS: ICD-10-CM

## 2021-08-09 PROCEDURE — 99214 OFFICE O/P EST MOD 30 MIN: CPT | Mod: 95,,, | Performed by: INTERNAL MEDICINE

## 2021-08-09 PROCEDURE — 1160F RVW MEDS BY RX/DR IN RCRD: CPT | Mod: CPTII,95,, | Performed by: INTERNAL MEDICINE

## 2021-08-09 PROCEDURE — 3044F HG A1C LEVEL LT 7.0%: CPT | Mod: CPTII,95,, | Performed by: INTERNAL MEDICINE

## 2021-08-09 PROCEDURE — 3044F PR MOST RECENT HEMOGLOBIN A1C LEVEL <7.0%: ICD-10-PCS | Mod: CPTII,95,, | Performed by: INTERNAL MEDICINE

## 2021-08-09 PROCEDURE — 1160F PR REVIEW ALL MEDS BY PRESCRIBER/CLIN PHARMACIST DOCUMENTED: ICD-10-PCS | Mod: CPTII,95,, | Performed by: INTERNAL MEDICINE

## 2021-08-09 PROCEDURE — 99214 PR OFFICE/OUTPT VISIT, EST, LEVL IV, 30-39 MIN: ICD-10-PCS | Mod: 95,,, | Performed by: INTERNAL MEDICINE

## 2021-08-09 PROCEDURE — 1159F PR MEDICATION LIST DOCUMENTED IN MEDICAL RECORD: ICD-10-PCS | Mod: CPTII,95,, | Performed by: INTERNAL MEDICINE

## 2021-08-09 PROCEDURE — 1159F MED LIST DOCD IN RCRD: CPT | Mod: CPTII,95,, | Performed by: INTERNAL MEDICINE

## 2021-08-09 RX ORDER — GABAPENTIN 300 MG/1
CAPSULE ORAL
Qty: 120 CAPSULE | Refills: 11 | Status: SHIPPED | OUTPATIENT
Start: 2021-08-09 | End: 2022-09-16

## 2021-08-10 ENCOUNTER — PATIENT OUTREACH (OUTPATIENT)
Dept: ADMINISTRATIVE | Facility: OTHER | Age: 26
End: 2021-08-10

## 2021-08-10 ENCOUNTER — OFFICE VISIT (OUTPATIENT)
Dept: NEUROLOGY | Facility: CLINIC | Age: 26
End: 2021-08-10
Payer: COMMERCIAL

## 2021-08-10 DIAGNOSIS — G43.711 INTRACTABLE CHRONIC MIGRAINE WITHOUT AURA AND WITH STATUS MIGRAINOSUS: Primary | ICD-10-CM

## 2021-08-10 PROCEDURE — 1126F PR PAIN SEVERITY QUANTIFIED, NO PAIN PRESENT: ICD-10-PCS | Mod: CPTII,95,, | Performed by: NURSE PRACTITIONER

## 2021-08-10 PROCEDURE — 3044F PR MOST RECENT HEMOGLOBIN A1C LEVEL <7.0%: ICD-10-PCS | Mod: CPTII,95,, | Performed by: NURSE PRACTITIONER

## 2021-08-10 PROCEDURE — 1126F AMNT PAIN NOTED NONE PRSNT: CPT | Mod: CPTII,95,, | Performed by: NURSE PRACTITIONER

## 2021-08-10 PROCEDURE — 1159F MED LIST DOCD IN RCRD: CPT | Mod: CPTII,95,, | Performed by: NURSE PRACTITIONER

## 2021-08-10 PROCEDURE — 1159F PR MEDICATION LIST DOCUMENTED IN MEDICAL RECORD: ICD-10-PCS | Mod: CPTII,95,, | Performed by: NURSE PRACTITIONER

## 2021-08-10 PROCEDURE — 1160F PR REVIEW ALL MEDS BY PRESCRIBER/CLIN PHARMACIST DOCUMENTED: ICD-10-PCS | Mod: CPTII,95,, | Performed by: NURSE PRACTITIONER

## 2021-08-10 PROCEDURE — 1160F RVW MEDS BY RX/DR IN RCRD: CPT | Mod: CPTII,95,, | Performed by: NURSE PRACTITIONER

## 2021-08-10 PROCEDURE — 99213 PR OFFICE/OUTPT VISIT, EST, LEVL III, 20-29 MIN: ICD-10-PCS | Mod: 95,,, | Performed by: NURSE PRACTITIONER

## 2021-08-10 PROCEDURE — 99213 OFFICE O/P EST LOW 20 MIN: CPT | Mod: 95,,, | Performed by: NURSE PRACTITIONER

## 2021-08-10 PROCEDURE — 3044F HG A1C LEVEL LT 7.0%: CPT | Mod: CPTII,95,, | Performed by: NURSE PRACTITIONER

## 2021-08-12 ENCOUNTER — OFFICE VISIT (OUTPATIENT)
Dept: PAIN MEDICINE | Facility: CLINIC | Age: 26
End: 2021-08-12
Payer: COMMERCIAL

## 2021-08-12 VITALS
DIASTOLIC BLOOD PRESSURE: 73 MMHG | HEIGHT: 62 IN | TEMPERATURE: 99 F | HEART RATE: 77 BPM | WEIGHT: 217.25 LBS | BODY MASS INDEX: 39.98 KG/M2 | SYSTOLIC BLOOD PRESSURE: 108 MMHG

## 2021-08-12 DIAGNOSIS — Z11.9 SCREENING EXAMINATION FOR INFECTIOUS DISEASE: ICD-10-CM

## 2021-08-12 DIAGNOSIS — M50.30 DDD (DEGENERATIVE DISC DISEASE), CERVICAL: ICD-10-CM

## 2021-08-12 DIAGNOSIS — M54.12 CERVICAL RADICULOPATHY: Primary | ICD-10-CM

## 2021-08-12 DIAGNOSIS — M48.02 CERVICAL SPINAL STENOSIS: ICD-10-CM

## 2021-08-12 PROCEDURE — 3078F DIAST BP <80 MM HG: CPT | Mod: CPTII,S$GLB,CS, | Performed by: ANESTHESIOLOGY

## 2021-08-12 PROCEDURE — 3074F SYST BP LT 130 MM HG: CPT | Mod: CPTII,S$GLB,CS, | Performed by: ANESTHESIOLOGY

## 2021-08-12 PROCEDURE — 99204 OFFICE O/P NEW MOD 45 MIN: CPT | Mod: S$GLB,CS,, | Performed by: ANESTHESIOLOGY

## 2021-08-12 PROCEDURE — 1125F PR PAIN SEVERITY QUANTIFIED, PAIN PRESENT: ICD-10-PCS | Mod: CPTII,S$GLB,CS, | Performed by: ANESTHESIOLOGY

## 2021-08-12 PROCEDURE — 99999 PR PBB SHADOW E&M-EST. PATIENT-LVL V: CPT | Mod: PBBFAC,,, | Performed by: ANESTHESIOLOGY

## 2021-08-12 PROCEDURE — 3008F BODY MASS INDEX DOCD: CPT | Mod: CPTII,S$GLB,CS, | Performed by: ANESTHESIOLOGY

## 2021-08-12 PROCEDURE — 3008F PR BODY MASS INDEX (BMI) DOCUMENTED: ICD-10-PCS | Mod: CPTII,S$GLB,CS, | Performed by: ANESTHESIOLOGY

## 2021-08-12 PROCEDURE — 3078F PR MOST RECENT DIASTOLIC BLOOD PRESSURE < 80 MM HG: ICD-10-PCS | Mod: CPTII,S$GLB,CS, | Performed by: ANESTHESIOLOGY

## 2021-08-12 PROCEDURE — 3044F PR MOST RECENT HEMOGLOBIN A1C LEVEL <7.0%: ICD-10-PCS | Mod: CPTII,S$GLB,CS, | Performed by: ANESTHESIOLOGY

## 2021-08-12 PROCEDURE — 1125F AMNT PAIN NOTED PAIN PRSNT: CPT | Mod: CPTII,S$GLB,CS, | Performed by: ANESTHESIOLOGY

## 2021-08-12 PROCEDURE — 1159F PR MEDICATION LIST DOCUMENTED IN MEDICAL RECORD: ICD-10-PCS | Mod: CPTII,S$GLB,CS, | Performed by: ANESTHESIOLOGY

## 2021-08-12 PROCEDURE — 3074F PR MOST RECENT SYSTOLIC BLOOD PRESSURE < 130 MM HG: ICD-10-PCS | Mod: CPTII,S$GLB,CS, | Performed by: ANESTHESIOLOGY

## 2021-08-12 PROCEDURE — 1159F MED LIST DOCD IN RCRD: CPT | Mod: CPTII,S$GLB,CS, | Performed by: ANESTHESIOLOGY

## 2021-08-12 PROCEDURE — 99999 PR PBB SHADOW E&M-EST. PATIENT-LVL V: ICD-10-PCS | Mod: PBBFAC,,, | Performed by: ANESTHESIOLOGY

## 2021-08-12 PROCEDURE — 99204 PR OFFICE/OUTPT VISIT, NEW, LEVL IV, 45-59 MIN: ICD-10-PCS | Mod: S$GLB,CS,, | Performed by: ANESTHESIOLOGY

## 2021-08-12 PROCEDURE — 3044F HG A1C LEVEL LT 7.0%: CPT | Mod: CPTII,S$GLB,CS, | Performed by: ANESTHESIOLOGY

## 2021-08-12 RX ORDER — DEXTROAMPHETAMINE SACCHARATE, AMPHETAMINE ASPARTATE MONOHYDRATE, DEXTROAMPHETAMINE SULFATE AND AMPHETAMINE SULFATE 7.5; 7.5; 7.5; 7.5 MG/1; MG/1; MG/1; MG/1
CAPSULE, EXTENDED RELEASE ORAL EVERY MORNING
COMMUNITY
Start: 2021-07-14 | End: 2022-12-09

## 2021-08-12 RX ORDER — DULOXETINE 40 MG/1
1 CAPSULE, DELAYED RELEASE ORAL NIGHTLY
COMMUNITY
Start: 2021-07-18 | End: 2022-05-04 | Stop reason: SDUPTHER

## 2021-08-12 RX ORDER — SODIUM CHLORIDE, SODIUM LACTATE, POTASSIUM CHLORIDE, CALCIUM CHLORIDE 600; 310; 30; 20 MG/100ML; MG/100ML; MG/100ML; MG/100ML
INJECTION, SOLUTION INTRAVENOUS CONTINUOUS
Status: CANCELLED | OUTPATIENT
Start: 2021-09-01

## 2021-08-12 RX ORDER — BUSPIRONE HYDROCHLORIDE 10 MG/1
10 TABLET ORAL NIGHTLY
COMMUNITY
Start: 2021-07-09 | End: 2021-11-22 | Stop reason: DRUGHIGH

## 2021-08-13 ENCOUNTER — OFFICE VISIT (OUTPATIENT)
Dept: FAMILY MEDICINE | Facility: CLINIC | Age: 26
End: 2021-08-13
Payer: COMMERCIAL

## 2021-08-13 VITALS
SYSTOLIC BLOOD PRESSURE: 100 MMHG | TEMPERATURE: 98 F | HEIGHT: 63 IN | DIASTOLIC BLOOD PRESSURE: 62 MMHG | WEIGHT: 216.19 LBS | OXYGEN SATURATION: 98 % | RESPIRATION RATE: 16 BRPM | HEART RATE: 88 BPM | BODY MASS INDEX: 38.3 KG/M2

## 2021-08-13 DIAGNOSIS — R10.9 ABDOMINAL PAIN, UNSPECIFIED ABDOMINAL LOCATION: Primary | ICD-10-CM

## 2021-08-13 LAB
BILIRUB SERPL-MCNC: NEGATIVE MG/DL
BLOOD URINE, POC: ABNORMAL
CLARITY, POC UA: ABNORMAL
COLOR, POC UA: YELLOW
GLUCOSE UR QL STRIP: NORMAL
KETONES UR QL STRIP: NEGATIVE
LEUKOCYTE ESTERASE URINE, POC: POSITIVE
NITRITE, POC UA: NEGATIVE
PH, POC UA: 5
PROTEIN, POC: ABNORMAL
SPECIFIC GRAVITY, POC UA: 1.02
UROBILINOGEN, POC UA: NEGATIVE

## 2021-08-13 PROCEDURE — 99214 PR OFFICE/OUTPT VISIT, EST, LEVL IV, 30-39 MIN: ICD-10-PCS | Mod: S$GLB,,, | Performed by: NURSE PRACTITIONER

## 2021-08-13 PROCEDURE — 81002 URINALYSIS NONAUTO W/O SCOPE: CPT | Mod: S$GLB,,, | Performed by: NURSE PRACTITIONER

## 2021-08-13 PROCEDURE — 3008F BODY MASS INDEX DOCD: CPT | Mod: CPTII,S$GLB,, | Performed by: NURSE PRACTITIONER

## 2021-08-13 PROCEDURE — 3078F DIAST BP <80 MM HG: CPT | Mod: CPTII,S$GLB,, | Performed by: NURSE PRACTITIONER

## 2021-08-13 PROCEDURE — 1159F PR MEDICATION LIST DOCUMENTED IN MEDICAL RECORD: ICD-10-PCS | Mod: CPTII,S$GLB,, | Performed by: NURSE PRACTITIONER

## 2021-08-13 PROCEDURE — 87088 URINE BACTERIA CULTURE: CPT | Performed by: NURSE PRACTITIONER

## 2021-08-13 PROCEDURE — 87591 N.GONORRHOEAE DNA AMP PROB: CPT | Performed by: NURSE PRACTITIONER

## 2021-08-13 PROCEDURE — 3044F HG A1C LEVEL LT 7.0%: CPT | Mod: CPTII,S$GLB,, | Performed by: NURSE PRACTITIONER

## 2021-08-13 PROCEDURE — 3008F PR BODY MASS INDEX (BMI) DOCUMENTED: ICD-10-PCS | Mod: CPTII,S$GLB,, | Performed by: NURSE PRACTITIONER

## 2021-08-13 PROCEDURE — 1125F PR PAIN SEVERITY QUANTIFIED, PAIN PRESENT: ICD-10-PCS | Mod: CPTII,S$GLB,, | Performed by: NURSE PRACTITIONER

## 2021-08-13 PROCEDURE — 3074F PR MOST RECENT SYSTOLIC BLOOD PRESSURE < 130 MM HG: ICD-10-PCS | Mod: CPTII,S$GLB,, | Performed by: NURSE PRACTITIONER

## 2021-08-13 PROCEDURE — 3078F PR MOST RECENT DIASTOLIC BLOOD PRESSURE < 80 MM HG: ICD-10-PCS | Mod: CPTII,S$GLB,, | Performed by: NURSE PRACTITIONER

## 2021-08-13 PROCEDURE — 3074F SYST BP LT 130 MM HG: CPT | Mod: CPTII,S$GLB,, | Performed by: NURSE PRACTITIONER

## 2021-08-13 PROCEDURE — 3044F PR MOST RECENT HEMOGLOBIN A1C LEVEL <7.0%: ICD-10-PCS | Mod: CPTII,S$GLB,, | Performed by: NURSE PRACTITIONER

## 2021-08-13 PROCEDURE — 87491 CHLMYD TRACH DNA AMP PROBE: CPT | Performed by: NURSE PRACTITIONER

## 2021-08-13 PROCEDURE — 87077 CULTURE AEROBIC IDENTIFY: CPT | Performed by: NURSE PRACTITIONER

## 2021-08-13 PROCEDURE — 87086 URINE CULTURE/COLONY COUNT: CPT | Performed by: NURSE PRACTITIONER

## 2021-08-13 PROCEDURE — 1159F MED LIST DOCD IN RCRD: CPT | Mod: CPTII,S$GLB,, | Performed by: NURSE PRACTITIONER

## 2021-08-13 PROCEDURE — 81002 POCT URINE DIPSTICK WITHOUT MICROSCOPE: ICD-10-PCS | Mod: S$GLB,,, | Performed by: NURSE PRACTITIONER

## 2021-08-13 PROCEDURE — 87186 SC STD MICRODIL/AGAR DIL: CPT | Performed by: NURSE PRACTITIONER

## 2021-08-13 PROCEDURE — 99214 OFFICE O/P EST MOD 30 MIN: CPT | Mod: S$GLB,,, | Performed by: NURSE PRACTITIONER

## 2021-08-13 PROCEDURE — 1125F AMNT PAIN NOTED PAIN PRSNT: CPT | Mod: CPTII,S$GLB,, | Performed by: NURSE PRACTITIONER

## 2021-08-13 RX ORDER — PANTOPRAZOLE SODIUM 40 MG/1
40 TABLET, DELAYED RELEASE ORAL DAILY
Qty: 90 TABLET | Refills: 3 | Status: SHIPPED | OUTPATIENT
Start: 2021-08-13 | End: 2022-08-21

## 2021-08-14 ENCOUNTER — PATIENT MESSAGE (OUTPATIENT)
Dept: FAMILY MEDICINE | Facility: CLINIC | Age: 26
End: 2021-08-14

## 2021-08-14 LAB
C TRACH DNA SPEC QL NAA+PROBE: NOT DETECTED
N GONORRHOEA DNA SPEC QL NAA+PROBE: NOT DETECTED

## 2021-08-14 RX ORDER — SULFAMETHOXAZOLE AND TRIMETHOPRIM 800; 160 MG/1; MG/1
1 TABLET ORAL 2 TIMES DAILY
Qty: 6 TABLET | Refills: 0 | Status: SHIPPED | OUTPATIENT
Start: 2021-08-14 | End: 2021-08-17

## 2021-08-15 LAB — BACTERIA UR CULT: ABNORMAL

## 2021-08-16 ENCOUNTER — TELEPHONE (OUTPATIENT)
Dept: GASTROENTEROLOGY | Facility: CLINIC | Age: 26
End: 2021-08-16

## 2021-08-16 ENCOUNTER — PATIENT MESSAGE (OUTPATIENT)
Dept: GASTROENTEROLOGY | Facility: CLINIC | Age: 26
End: 2021-08-16

## 2021-08-25 ENCOUNTER — TELEPHONE (OUTPATIENT)
Dept: GASTROENTEROLOGY | Facility: CLINIC | Age: 26
End: 2021-08-25

## 2021-08-26 ENCOUNTER — PATIENT MESSAGE (OUTPATIENT)
Dept: GASTROENTEROLOGY | Facility: CLINIC | Age: 26
End: 2021-08-26

## 2021-09-07 ENCOUNTER — TELEPHONE (OUTPATIENT)
Dept: PAIN MEDICINE | Facility: CLINIC | Age: 26
End: 2021-09-07

## 2021-09-24 ENCOUNTER — PATIENT MESSAGE (OUTPATIENT)
Dept: PAIN MEDICINE | Facility: CLINIC | Age: 26
End: 2021-09-24

## 2021-09-24 ENCOUNTER — PATIENT MESSAGE (OUTPATIENT)
Dept: NEUROLOGY | Facility: CLINIC | Age: 26
End: 2021-09-24

## 2021-10-08 ENCOUNTER — LAB VISIT (OUTPATIENT)
Dept: FAMILY MEDICINE | Facility: CLINIC | Age: 26
End: 2021-10-08
Payer: COMMERCIAL

## 2021-10-08 DIAGNOSIS — Z11.9 SCREENING EXAMINATION FOR INFECTIOUS DISEASE: ICD-10-CM

## 2021-10-08 PROCEDURE — U0005 INFEC AGEN DETEC AMPLI PROBE: HCPCS | Performed by: ANESTHESIOLOGY

## 2021-10-08 PROCEDURE — U0003 INFECTIOUS AGENT DETECTION BY NUCLEIC ACID (DNA OR RNA); SEVERE ACUTE RESPIRATORY SYNDROME CORONAVIRUS 2 (SARS-COV-2) (CORONAVIRUS DISEASE [COVID-19]), AMPLIFIED PROBE TECHNIQUE, MAKING USE OF HIGH THROUGHPUT TECHNOLOGIES AS DESCRIBED BY CMS-2020-01-R: HCPCS | Performed by: ANESTHESIOLOGY

## 2021-10-09 DIAGNOSIS — Z30.9 ENCOUNTER FOR CONTRACEPTIVE MANAGEMENT, UNSPECIFIED TYPE: ICD-10-CM

## 2021-10-09 LAB
SARS-COV-2 RNA RESP QL NAA+PROBE: NOT DETECTED
SARS-COV-2- CYCLE NUMBER: NORMAL

## 2021-10-09 RX ORDER — NORGESTIMATE AND ETHINYL ESTRADIOL 0.25-0.035
KIT ORAL
Qty: 28 TABLET | Refills: 3 | Status: SHIPPED | OUTPATIENT
Start: 2021-10-09 | End: 2022-01-27

## 2021-10-11 ENCOUNTER — HOSPITAL ENCOUNTER (OUTPATIENT)
Facility: HOSPITAL | Age: 26
Discharge: HOME | End: 2021-10-11
Attending: ANESTHESIOLOGY | Admitting: ANESTHESIOLOGY
Payer: COMMERCIAL

## 2021-10-11 ENCOUNTER — HOSPITAL ENCOUNTER (OUTPATIENT)
Dept: RADIOLOGY | Facility: HOSPITAL | Age: 26
Discharge: HOME | End: 2021-10-11
Attending: ANESTHESIOLOGY
Payer: COMMERCIAL

## 2021-10-11 VITALS
HEIGHT: 63 IN | HEART RATE: 80 BPM | OXYGEN SATURATION: 97 % | BODY MASS INDEX: 37.74 KG/M2 | DIASTOLIC BLOOD PRESSURE: 63 MMHG | RESPIRATION RATE: 16 BRPM | SYSTOLIC BLOOD PRESSURE: 111 MMHG | TEMPERATURE: 97 F | WEIGHT: 213 LBS

## 2021-10-11 DIAGNOSIS — M54.12 CERVICAL RADICULOPATHY: ICD-10-CM

## 2021-10-11 LAB
B-HCG UR QL: NEGATIVE
CTP QC/QA: YES

## 2021-10-11 PROCEDURE — 63600175 PHARM REV CODE 636 W HCPCS: Mod: PO | Performed by: ANESTHESIOLOGY

## 2021-10-11 PROCEDURE — 25000003 PHARM REV CODE 250: Mod: PO | Performed by: ANESTHESIOLOGY

## 2021-10-11 PROCEDURE — A4216 STERILE WATER/SALINE, 10 ML: HCPCS | Mod: PO | Performed by: ANESTHESIOLOGY

## 2021-10-11 PROCEDURE — 62321 PR INJ CERV/THORAC, W/GUIDANCE: ICD-10-PCS | Mod: ,,, | Performed by: ANESTHESIOLOGY

## 2021-10-11 PROCEDURE — 62321 NJX INTERLAMINAR CRV/THRC: CPT | Mod: ,,, | Performed by: ANESTHESIOLOGY

## 2021-10-11 PROCEDURE — 76000 FLUOROSCOPY <1 HR PHYS/QHP: CPT | Mod: TC,PO

## 2021-10-11 PROCEDURE — 62321 NJX INTERLAMINAR CRV/THRC: CPT | Mod: PO | Performed by: ANESTHESIOLOGY

## 2021-10-11 PROCEDURE — 81025 URINE PREGNANCY TEST: CPT | Mod: PO | Performed by: ANESTHESIOLOGY

## 2021-10-11 PROCEDURE — 25500020 PHARM REV CODE 255: Mod: PO | Performed by: ANESTHESIOLOGY

## 2021-10-11 RX ORDER — SODIUM CHLORIDE, SODIUM LACTATE, POTASSIUM CHLORIDE, CALCIUM CHLORIDE 600; 310; 30; 20 MG/100ML; MG/100ML; MG/100ML; MG/100ML
INJECTION, SOLUTION INTRAVENOUS CONTINUOUS
Status: DISCONTINUED | OUTPATIENT
Start: 2021-10-11 | End: 2021-10-11 | Stop reason: HOSPADM

## 2021-10-11 RX ORDER — SODIUM CHLORIDE 9 MG/ML
INJECTION, SOLUTION INTRAMUSCULAR; INTRAVENOUS; SUBCUTANEOUS
Status: DISCONTINUED | OUTPATIENT
Start: 2021-10-11 | End: 2021-10-11 | Stop reason: HOSPADM

## 2021-10-11 RX ORDER — LIDOCAINE HYDROCHLORIDE 10 MG/ML
INJECTION, SOLUTION EPIDURAL; INFILTRATION; INTRACAUDAL; PERINEURAL
Status: DISCONTINUED | OUTPATIENT
Start: 2021-10-11 | End: 2021-10-11 | Stop reason: HOSPADM

## 2021-10-11 RX ORDER — MIDAZOLAM HYDROCHLORIDE 2 MG/2ML
INJECTION, SOLUTION INTRAMUSCULAR; INTRAVENOUS
Status: DISCONTINUED | OUTPATIENT
Start: 2021-10-11 | End: 2021-10-11 | Stop reason: HOSPADM

## 2021-10-11 RX ORDER — METHYLPREDNISOLONE ACETATE 80 MG/ML
INJECTION, SUSPENSION INTRA-ARTICULAR; INTRALESIONAL; INTRAMUSCULAR; SOFT TISSUE
Status: DISCONTINUED | OUTPATIENT
Start: 2021-10-11 | End: 2021-10-11 | Stop reason: HOSPADM

## 2021-10-11 RX ADMIN — SODIUM CHLORIDE, SODIUM LACTATE, POTASSIUM CHLORIDE, AND CALCIUM CHLORIDE: .6; .31; .03; .02 INJECTION, SOLUTION INTRAVENOUS at 01:10

## 2021-10-18 ENCOUNTER — OFFICE VISIT (OUTPATIENT)
Dept: CARDIOLOGY | Facility: CLINIC | Age: 26
End: 2021-10-18
Payer: COMMERCIAL

## 2021-10-18 VITALS
DIASTOLIC BLOOD PRESSURE: 81 MMHG | BODY MASS INDEX: 38.39 KG/M2 | HEART RATE: 87 BPM | HEIGHT: 63 IN | WEIGHT: 216.69 LBS | SYSTOLIC BLOOD PRESSURE: 117 MMHG

## 2021-10-18 DIAGNOSIS — R07.2 PRECORDIAL PAIN: Primary | ICD-10-CM

## 2021-10-18 DIAGNOSIS — G47.33 OBSTRUCTIVE SLEEP APNEA: Chronic | ICD-10-CM

## 2021-10-18 DIAGNOSIS — R00.0 TACHYCARDIA: Chronic | ICD-10-CM

## 2021-10-18 DIAGNOSIS — E66.01 SEVERE OBESITY (BMI 35.0-39.9) WITH COMORBIDITY: Chronic | ICD-10-CM

## 2021-10-18 DIAGNOSIS — G47.411 NARCOLEPSY AND CATAPLEXY: Chronic | ICD-10-CM

## 2021-10-18 DIAGNOSIS — R06.02 SOB (SHORTNESS OF BREATH): ICD-10-CM

## 2021-10-18 PROCEDURE — 93010 ELECTROCARDIOGRAM REPORT: CPT | Mod: S$GLB,,, | Performed by: INTERNAL MEDICINE

## 2021-10-18 PROCEDURE — 3079F PR MOST RECENT DIASTOLIC BLOOD PRESSURE 80-89 MM HG: ICD-10-PCS | Mod: CPTII,S$GLB,, | Performed by: INTERNAL MEDICINE

## 2021-10-18 PROCEDURE — 1159F PR MEDICATION LIST DOCUMENTED IN MEDICAL RECORD: ICD-10-PCS | Mod: CPTII,S$GLB,, | Performed by: INTERNAL MEDICINE

## 2021-10-18 PROCEDURE — 3044F HG A1C LEVEL LT 7.0%: CPT | Mod: CPTII,S$GLB,, | Performed by: INTERNAL MEDICINE

## 2021-10-18 PROCEDURE — 1159F MED LIST DOCD IN RCRD: CPT | Mod: CPTII,S$GLB,, | Performed by: INTERNAL MEDICINE

## 2021-10-18 PROCEDURE — 3008F PR BODY MASS INDEX (BMI) DOCUMENTED: ICD-10-PCS | Mod: CPTII,S$GLB,, | Performed by: INTERNAL MEDICINE

## 2021-10-18 PROCEDURE — 3044F PR MOST RECENT HEMOGLOBIN A1C LEVEL <7.0%: ICD-10-PCS | Mod: CPTII,S$GLB,, | Performed by: INTERNAL MEDICINE

## 2021-10-18 PROCEDURE — 99204 PR OFFICE/OUTPT VISIT, NEW, LEVL IV, 45-59 MIN: ICD-10-PCS | Mod: S$GLB,,, | Performed by: INTERNAL MEDICINE

## 2021-10-18 PROCEDURE — 3074F PR MOST RECENT SYSTOLIC BLOOD PRESSURE < 130 MM HG: ICD-10-PCS | Mod: CPTII,S$GLB,, | Performed by: INTERNAL MEDICINE

## 2021-10-18 PROCEDURE — 3008F BODY MASS INDEX DOCD: CPT | Mod: CPTII,S$GLB,, | Performed by: INTERNAL MEDICINE

## 2021-10-18 PROCEDURE — 93005 ELECTROCARDIOGRAM TRACING: CPT | Mod: PO

## 2021-10-18 PROCEDURE — 99999 PR PBB SHADOW E&M-EST. PATIENT-LVL IV: ICD-10-PCS | Mod: PBBFAC,,, | Performed by: INTERNAL MEDICINE

## 2021-10-18 PROCEDURE — 3079F DIAST BP 80-89 MM HG: CPT | Mod: CPTII,S$GLB,, | Performed by: INTERNAL MEDICINE

## 2021-10-18 PROCEDURE — 99204 OFFICE O/P NEW MOD 45 MIN: CPT | Mod: S$GLB,,, | Performed by: INTERNAL MEDICINE

## 2021-10-18 PROCEDURE — 3074F SYST BP LT 130 MM HG: CPT | Mod: CPTII,S$GLB,, | Performed by: INTERNAL MEDICINE

## 2021-10-18 PROCEDURE — 93010 EKG 12-LEAD: ICD-10-PCS | Mod: S$GLB,,, | Performed by: INTERNAL MEDICINE

## 2021-10-18 PROCEDURE — 99999 PR PBB SHADOW E&M-EST. PATIENT-LVL IV: CPT | Mod: PBBFAC,,, | Performed by: INTERNAL MEDICINE

## 2021-10-27 ENCOUNTER — OFFICE VISIT (OUTPATIENT)
Dept: PAIN MEDICINE | Facility: CLINIC | Age: 26
End: 2021-10-27
Payer: COMMERCIAL

## 2021-10-27 ENCOUNTER — HOSPITAL ENCOUNTER (OUTPATIENT)
Dept: RADIOLOGY | Facility: HOSPITAL | Age: 26
Discharge: HOME | End: 2021-10-27
Attending: ANESTHESIOLOGY
Payer: COMMERCIAL

## 2021-10-27 VITALS
DIASTOLIC BLOOD PRESSURE: 74 MMHG | SYSTOLIC BLOOD PRESSURE: 115 MMHG | HEART RATE: 89 BPM | BODY MASS INDEX: 39.06 KG/M2 | HEIGHT: 63 IN | WEIGHT: 220.44 LBS

## 2021-10-27 DIAGNOSIS — M48.02 CERVICAL SPINAL STENOSIS: Primary | ICD-10-CM

## 2021-10-27 DIAGNOSIS — M54.16 BILATERAL LUMBAR RADICULOPATHY: ICD-10-CM

## 2021-10-27 DIAGNOSIS — M53.3 COCCYGODYNIA: ICD-10-CM

## 2021-10-27 DIAGNOSIS — M50.30 DDD (DEGENERATIVE DISC DISEASE), CERVICAL: ICD-10-CM

## 2021-10-27 DIAGNOSIS — M54.9 DORSALGIA, UNSPECIFIED: ICD-10-CM

## 2021-10-27 DIAGNOSIS — M54.12 CERVICAL RADICULOPATHY: ICD-10-CM

## 2021-10-27 PROCEDURE — 3078F PR MOST RECENT DIASTOLIC BLOOD PRESSURE < 80 MM HG: ICD-10-PCS | Mod: CPTII,S$GLB,, | Performed by: ANESTHESIOLOGY

## 2021-10-27 PROCEDURE — 3008F BODY MASS INDEX DOCD: CPT | Mod: CPTII,S$GLB,, | Performed by: ANESTHESIOLOGY

## 2021-10-27 PROCEDURE — 3008F PR BODY MASS INDEX (BMI) DOCUMENTED: ICD-10-PCS | Mod: CPTII,S$GLB,, | Performed by: ANESTHESIOLOGY

## 2021-10-27 PROCEDURE — 72110 X-RAY EXAM L-2 SPINE 4/>VWS: CPT | Mod: TC,PO

## 2021-10-27 PROCEDURE — 72110 X-RAY EXAM L-2 SPINE 4/>VWS: CPT | Mod: 26,,, | Performed by: RADIOLOGY

## 2021-10-27 PROCEDURE — 1159F PR MEDICATION LIST DOCUMENTED IN MEDICAL RECORD: ICD-10-PCS | Mod: CPTII,S$GLB,, | Performed by: ANESTHESIOLOGY

## 2021-10-27 PROCEDURE — 72110 XR LUMBAR SPINE COMPLETE 5 VIEW: ICD-10-PCS | Mod: 26,,, | Performed by: RADIOLOGY

## 2021-10-27 PROCEDURE — 3078F DIAST BP <80 MM HG: CPT | Mod: CPTII,S$GLB,, | Performed by: ANESTHESIOLOGY

## 2021-10-27 PROCEDURE — 72220 X-RAY EXAM SACRUM TAILBONE: CPT | Mod: 26,,, | Performed by: RADIOLOGY

## 2021-10-27 PROCEDURE — 99214 OFFICE O/P EST MOD 30 MIN: CPT | Mod: S$GLB,,, | Performed by: ANESTHESIOLOGY

## 2021-10-27 PROCEDURE — 99214 PR OFFICE/OUTPT VISIT, EST, LEVL IV, 30-39 MIN: ICD-10-PCS | Mod: S$GLB,,, | Performed by: ANESTHESIOLOGY

## 2021-10-27 PROCEDURE — 3074F SYST BP LT 130 MM HG: CPT | Mod: CPTII,S$GLB,, | Performed by: ANESTHESIOLOGY

## 2021-10-27 PROCEDURE — 3074F PR MOST RECENT SYSTOLIC BLOOD PRESSURE < 130 MM HG: ICD-10-PCS | Mod: CPTII,S$GLB,, | Performed by: ANESTHESIOLOGY

## 2021-10-27 PROCEDURE — 72220 X-RAY EXAM SACRUM TAILBONE: CPT | Mod: TC,PO

## 2021-10-27 PROCEDURE — 72220 XR SACRUM AND COCCYX: ICD-10-PCS | Mod: 26,,, | Performed by: RADIOLOGY

## 2021-10-27 PROCEDURE — 99999 PR PBB SHADOW E&M-EST. PATIENT-LVL V: CPT | Mod: PBBFAC,,, | Performed by: ANESTHESIOLOGY

## 2021-10-27 PROCEDURE — 1159F MED LIST DOCD IN RCRD: CPT | Mod: CPTII,S$GLB,, | Performed by: ANESTHESIOLOGY

## 2021-10-27 PROCEDURE — 3044F HG A1C LEVEL LT 7.0%: CPT | Mod: CPTII,S$GLB,, | Performed by: ANESTHESIOLOGY

## 2021-10-27 PROCEDURE — 99999 PR PBB SHADOW E&M-EST. PATIENT-LVL V: ICD-10-PCS | Mod: PBBFAC,,, | Performed by: ANESTHESIOLOGY

## 2021-10-27 PROCEDURE — 3044F PR MOST RECENT HEMOGLOBIN A1C LEVEL <7.0%: ICD-10-PCS | Mod: CPTII,S$GLB,, | Performed by: ANESTHESIOLOGY

## 2021-11-01 ENCOUNTER — CLINICAL SUPPORT (OUTPATIENT)
Dept: CARDIOLOGY | Facility: HOSPITAL | Age: 26
End: 2021-11-01
Attending: INTERNAL MEDICINE
Payer: COMMERCIAL

## 2021-11-01 DIAGNOSIS — R00.0 TACHYCARDIA: ICD-10-CM

## 2021-11-01 PROCEDURE — 93227 HOLTER MONITOR - 24 HOUR (CUPID ONLY): ICD-10-PCS | Mod: ,,, | Performed by: INTERNAL MEDICINE

## 2021-11-01 PROCEDURE — 93226 XTRNL ECG REC<48 HR SCAN A/R: CPT | Mod: PO

## 2021-11-01 PROCEDURE — 93227 XTRNL ECG REC<48 HR R&I: CPT | Mod: ,,, | Performed by: INTERNAL MEDICINE

## 2021-11-02 ENCOUNTER — CLINICAL SUPPORT (OUTPATIENT)
Dept: CARDIOLOGY | Facility: HOSPITAL | Age: 26
End: 2021-11-02
Attending: INTERNAL MEDICINE
Payer: COMMERCIAL

## 2021-11-02 VITALS — WEIGHT: 220 LBS | HEIGHT: 63 IN | BODY MASS INDEX: 38.98 KG/M2

## 2021-11-02 VITALS — BODY MASS INDEX: 38.98 KG/M2 | HEIGHT: 63 IN | WEIGHT: 220 LBS

## 2021-11-02 DIAGNOSIS — R07.2 PRECORDIAL PAIN: ICD-10-CM

## 2021-11-02 DIAGNOSIS — R06.02 SOB (SHORTNESS OF BREATH): Chronic | ICD-10-CM

## 2021-11-02 LAB
CV STRESS BASE HR: 93 BPM
DIASTOLIC BLOOD PRESSURE: 84 MMHG
OHS CV CPX 1 MINUTE RECOVERY HEART RATE: 125 BPM
OHS CV CPX 85 PERCENT MAX PREDICTED HEART RATE MALE: 157
OHS CV CPX ESTIMATED METS: 9
OHS CV CPX MAX PREDICTED HEART RATE: 185
OHS CV CPX PATIENT IS FEMALE: 1
OHS CV CPX PATIENT IS MALE: 0
OHS CV CPX PEAK DIASTOLIC BLOOD PRESSURE: 81 MMHG
OHS CV CPX PEAK HEAR RATE: 162 BPM
OHS CV CPX PEAK RATE PRESSURE PRODUCT: NORMAL
OHS CV CPX PEAK SYSTOLIC BLOOD PRESSURE: 168 MMHG
OHS CV CPX PERCENT MAX PREDICTED HEART RATE ACHIEVED: 88
OHS CV CPX RATE PRESSURE PRODUCT PRESENTING: NORMAL
STRESS ECHO POST EXERCISE DUR MIN: 5 MINUTES
STRESS ECHO POST EXERCISE DUR SEC: 1 SECONDS
SYSTOLIC BLOOD PRESSURE: 116 MMHG

## 2021-11-02 PROCEDURE — 93306 ECHO (CUPID ONLY): ICD-10-PCS | Mod: 26,,, | Performed by: INTERNAL MEDICINE

## 2021-11-02 PROCEDURE — 93016 EXERCISE STRESS - EKG (CUPID ONLY): ICD-10-PCS | Mod: ,,, | Performed by: INTERNAL MEDICINE

## 2021-11-02 PROCEDURE — 93018 EXERCISE STRESS - EKG (CUPID ONLY): ICD-10-PCS | Mod: ,,, | Performed by: INTERNAL MEDICINE

## 2021-11-02 PROCEDURE — 93306 TTE W/DOPPLER COMPLETE: CPT | Mod: 26,,, | Performed by: INTERNAL MEDICINE

## 2021-11-02 PROCEDURE — 93018 CV STRESS TEST I&R ONLY: CPT | Mod: ,,, | Performed by: INTERNAL MEDICINE

## 2021-11-02 PROCEDURE — 93306 TTE W/DOPPLER COMPLETE: CPT | Mod: PO

## 2021-11-02 PROCEDURE — 93016 CV STRESS TEST SUPVJ ONLY: CPT | Mod: ,,, | Performed by: INTERNAL MEDICINE

## 2021-11-02 PROCEDURE — 93017 CV STRESS TEST TRACING ONLY: CPT | Mod: PO

## 2021-11-03 LAB
ASCENDING AORTA: 2.69 CM
AV INDEX (PROSTH): 0.76
AV MEAN GRADIENT: 4 MMHG
AV PEAK GRADIENT: 7 MMHG
AV VALVE AREA: 2.43 CM2
AV VELOCITY RATIO: 0.8
BSA FOR ECHO PROCEDURE: 2.11 M2
CV ECHO LV RWT: 0.33 CM
DOP CALC AO PEAK VEL: 1.28 M/S
DOP CALC AO VTI: 27.34 CM
DOP CALC LVOT AREA: 3.2 CM2
DOP CALC LVOT DIAMETER: 2.02 CM
DOP CALC LVOT PEAK VEL: 1.02 M/S
DOP CALC LVOT STROKE VOLUME: 66.5 CM3
DOP CALCLVOT PEAK VEL VTI: 20.76 CM
E WAVE DECELERATION TIME: 156.99 MSEC
E/A RATIO: 1.25
E/E' RATIO: 5.33 M/S
ECHO LV POSTERIOR WALL: 0.73 CM (ref 0.6–1.1)
EJECTION FRACTION: 60 %
FRACTIONAL SHORTENING: 31 % (ref 28–44)
INTERVENTRICULAR SEPTUM: 0.71 CM (ref 0.6–1.1)
LA MAJOR: 4.39 CM
LA MINOR: 4.53 CM
LA WIDTH: 2.97 CM
LEFT ATRIUM SIZE: 3 CM
LEFT ATRIUM VOLUME INDEX: 16.8 ML/M2
LEFT ATRIUM VOLUME: 33.77 CM3
LEFT INTERNAL DIMENSION IN SYSTOLE: 3.04 CM (ref 2.1–4)
LEFT VENTRICLE DIASTOLIC VOLUME INDEX: 44.43 ML/M2
LEFT VENTRICLE DIASTOLIC VOLUME: 89.3 ML
LEFT VENTRICLE MASS INDEX: 48 G/M2
LEFT VENTRICLE SYSTOLIC VOLUME INDEX: 17.9 ML/M2
LEFT VENTRICLE SYSTOLIC VOLUME: 36.07 ML
LEFT VENTRICULAR INTERNAL DIMENSION IN DIASTOLE: 4.43 CM (ref 3.5–6)
LEFT VENTRICULAR MASS: 96.55 G
LV LATERAL E/E' RATIO: 4.21 M/S
LV SEPTAL E/E' RATIO: 7.27 M/S
MV A" WAVE DURATION": 8.75 MSEC
MV PEAK A VEL: 0.64 M/S
MV PEAK E VEL: 0.8 M/S
MV STENOSIS PRESSURE HALF TIME: 45.53 MS
MV VALVE AREA P 1/2 METHOD: 4.83 CM2
OHS CV EVENT MONITOR DAY: 0
OHS CV HOLTER LENGTH DECIMAL HOURS: 24
OHS CV HOLTER LENGTH HOURS: 24
OHS CV HOLTER LENGTH MINUTES: 0
OHS CV HOLTER SINUS AVERAGE HR: 90
OHS CV HOLTER SINUS MAX HR: 143
OHS CV HOLTER SINUS MIN HR: 53
PISA TR MAX VEL: 1.77 M/S
PULM VEIN S/D RATIO: 1.04
PV PEAK D VEL: 0.5 M/S
PV PEAK S VEL: 0.52 M/S
RA MAJOR: 4.01 CM
RA PRESSURE: 3 MMHG
RA WIDTH: 2.66 CM
RIGHT VENTRICULAR END-DIASTOLIC DIMENSION: 2.95 CM
RV TISSUE DOPPLER FREE WALL SYSTOLIC VELOCITY 1 (APICAL 4 CHAMBER VIEW): 13.23 CM/S
SINUS: 2.56 CM
STJ: 2.37 CM
TDI LATERAL: 0.19 M/S
TDI SEPTAL: 0.11 M/S
TDI: 0.15 M/S
TR MAX PG: 13 MMHG
TRICUSPID ANNULAR PLANE SYSTOLIC EXCURSION: 1.85 CM
TV REST PULMONARY ARTERY PRESSURE: 16 MMHG

## 2021-11-04 ENCOUNTER — OFFICE VISIT (OUTPATIENT)
Dept: CARDIOLOGY | Facility: CLINIC | Age: 26
End: 2021-11-04
Payer: COMMERCIAL

## 2021-11-04 VITALS
WEIGHT: 217.13 LBS | BODY MASS INDEX: 38.47 KG/M2 | DIASTOLIC BLOOD PRESSURE: 80 MMHG | HEART RATE: 92 BPM | SYSTOLIC BLOOD PRESSURE: 114 MMHG | HEIGHT: 63 IN

## 2021-11-04 DIAGNOSIS — G47.411 NARCOLEPSY AND CATAPLEXY: Chronic | ICD-10-CM

## 2021-11-04 DIAGNOSIS — I49.1 ATRIAL PREMATURE BEATS: Primary | ICD-10-CM

## 2021-11-04 DIAGNOSIS — R07.2 PRECORDIAL PAIN: ICD-10-CM

## 2021-11-04 DIAGNOSIS — E66.01 SEVERE OBESITY (BMI 35.0-39.9) WITH COMORBIDITY: Chronic | ICD-10-CM

## 2021-11-04 PROCEDURE — 1159F PR MEDICATION LIST DOCUMENTED IN MEDICAL RECORD: ICD-10-PCS | Mod: CPTII,S$GLB,, | Performed by: INTERNAL MEDICINE

## 2021-11-04 PROCEDURE — 99213 PR OFFICE/OUTPT VISIT, EST, LEVL III, 20-29 MIN: ICD-10-PCS | Mod: S$GLB,,, | Performed by: INTERNAL MEDICINE

## 2021-11-04 PROCEDURE — 99999 PR PBB SHADOW E&M-EST. PATIENT-LVL III: ICD-10-PCS | Mod: PBBFAC,,, | Performed by: INTERNAL MEDICINE

## 2021-11-04 PROCEDURE — 1159F MED LIST DOCD IN RCRD: CPT | Mod: CPTII,S$GLB,, | Performed by: INTERNAL MEDICINE

## 2021-11-04 PROCEDURE — 3079F PR MOST RECENT DIASTOLIC BLOOD PRESSURE 80-89 MM HG: ICD-10-PCS | Mod: CPTII,S$GLB,, | Performed by: INTERNAL MEDICINE

## 2021-11-04 PROCEDURE — 3074F SYST BP LT 130 MM HG: CPT | Mod: CPTII,S$GLB,, | Performed by: INTERNAL MEDICINE

## 2021-11-04 PROCEDURE — 3074F PR MOST RECENT SYSTOLIC BLOOD PRESSURE < 130 MM HG: ICD-10-PCS | Mod: CPTII,S$GLB,, | Performed by: INTERNAL MEDICINE

## 2021-11-04 PROCEDURE — 3044F PR MOST RECENT HEMOGLOBIN A1C LEVEL <7.0%: ICD-10-PCS | Mod: CPTII,S$GLB,, | Performed by: INTERNAL MEDICINE

## 2021-11-04 PROCEDURE — 3008F BODY MASS INDEX DOCD: CPT | Mod: CPTII,S$GLB,, | Performed by: INTERNAL MEDICINE

## 2021-11-04 PROCEDURE — 3079F DIAST BP 80-89 MM HG: CPT | Mod: CPTII,S$GLB,, | Performed by: INTERNAL MEDICINE

## 2021-11-04 PROCEDURE — 3008F PR BODY MASS INDEX (BMI) DOCUMENTED: ICD-10-PCS | Mod: CPTII,S$GLB,, | Performed by: INTERNAL MEDICINE

## 2021-11-04 PROCEDURE — 99213 OFFICE O/P EST LOW 20 MIN: CPT | Mod: S$GLB,,, | Performed by: INTERNAL MEDICINE

## 2021-11-04 PROCEDURE — 3044F HG A1C LEVEL LT 7.0%: CPT | Mod: CPTII,S$GLB,, | Performed by: INTERNAL MEDICINE

## 2021-11-04 PROCEDURE — 99999 PR PBB SHADOW E&M-EST. PATIENT-LVL III: CPT | Mod: PBBFAC,,, | Performed by: INTERNAL MEDICINE

## 2021-11-11 ENCOUNTER — HOSPITAL ENCOUNTER (OUTPATIENT)
Dept: RADIOLOGY | Facility: HOSPITAL | Age: 26
Discharge: HOME | End: 2021-11-11
Attending: STUDENT IN AN ORGANIZED HEALTH CARE EDUCATION/TRAINING PROGRAM
Payer: COMMERCIAL

## 2021-11-11 ENCOUNTER — TELEPHONE (OUTPATIENT)
Dept: PAIN MEDICINE | Facility: CLINIC | Age: 26
End: 2021-11-11
Payer: COMMERCIAL

## 2021-11-11 ENCOUNTER — OFFICE VISIT (OUTPATIENT)
Dept: NEUROSURGERY | Facility: CLINIC | Age: 26
End: 2021-11-11
Payer: COMMERCIAL

## 2021-11-11 ENCOUNTER — PATIENT MESSAGE (OUTPATIENT)
Dept: PAIN MEDICINE | Facility: CLINIC | Age: 26
End: 2021-11-11
Payer: COMMERCIAL

## 2021-11-11 VITALS
BODY MASS INDEX: 38.47 KG/M2 | SYSTOLIC BLOOD PRESSURE: 115 MMHG | HEART RATE: 92 BPM | DIASTOLIC BLOOD PRESSURE: 80 MMHG | RESPIRATION RATE: 18 BRPM | HEIGHT: 63 IN | WEIGHT: 217.13 LBS

## 2021-11-11 DIAGNOSIS — M54.12 CERVICAL RADICULOPATHY: ICD-10-CM

## 2021-11-11 DIAGNOSIS — M48.02 CERVICAL SPINAL STENOSIS: ICD-10-CM

## 2021-11-11 PROCEDURE — 3079F PR MOST RECENT DIASTOLIC BLOOD PRESSURE 80-89 MM HG: ICD-10-PCS | Mod: CPTII,S$GLB,, | Performed by: STUDENT IN AN ORGANIZED HEALTH CARE EDUCATION/TRAINING PROGRAM

## 2021-11-11 PROCEDURE — 3044F PR MOST RECENT HEMOGLOBIN A1C LEVEL <7.0%: ICD-10-PCS | Mod: CPTII,S$GLB,, | Performed by: STUDENT IN AN ORGANIZED HEALTH CARE EDUCATION/TRAINING PROGRAM

## 2021-11-11 PROCEDURE — 3074F PR MOST RECENT SYSTOLIC BLOOD PRESSURE < 130 MM HG: ICD-10-PCS | Mod: CPTII,S$GLB,, | Performed by: STUDENT IN AN ORGANIZED HEALTH CARE EDUCATION/TRAINING PROGRAM

## 2021-11-11 PROCEDURE — 99999 PR PBB SHADOW E&M-EST. PATIENT-LVL IV: CPT | Mod: PBBFAC,,, | Performed by: STUDENT IN AN ORGANIZED HEALTH CARE EDUCATION/TRAINING PROGRAM

## 2021-11-11 PROCEDURE — 1159F MED LIST DOCD IN RCRD: CPT | Mod: CPTII,S$GLB,, | Performed by: STUDENT IN AN ORGANIZED HEALTH CARE EDUCATION/TRAINING PROGRAM

## 2021-11-11 PROCEDURE — 72050 XR CERVICAL SPINE AP LAT WITH FLEX EXTEN: ICD-10-PCS | Mod: 26,,, | Performed by: RADIOLOGY

## 2021-11-11 PROCEDURE — 99999 PR PBB SHADOW E&M-EST. PATIENT-LVL IV: ICD-10-PCS | Mod: PBBFAC,,, | Performed by: STUDENT IN AN ORGANIZED HEALTH CARE EDUCATION/TRAINING PROGRAM

## 2021-11-11 PROCEDURE — 3008F BODY MASS INDEX DOCD: CPT | Mod: CPTII,S$GLB,, | Performed by: STUDENT IN AN ORGANIZED HEALTH CARE EDUCATION/TRAINING PROGRAM

## 2021-11-11 PROCEDURE — 1159F PR MEDICATION LIST DOCUMENTED IN MEDICAL RECORD: ICD-10-PCS | Mod: CPTII,S$GLB,, | Performed by: STUDENT IN AN ORGANIZED HEALTH CARE EDUCATION/TRAINING PROGRAM

## 2021-11-11 PROCEDURE — 99214 PR OFFICE/OUTPT VISIT, EST, LEVL IV, 30-39 MIN: ICD-10-PCS | Mod: S$GLB,,, | Performed by: STUDENT IN AN ORGANIZED HEALTH CARE EDUCATION/TRAINING PROGRAM

## 2021-11-11 PROCEDURE — 3044F HG A1C LEVEL LT 7.0%: CPT | Mod: CPTII,S$GLB,, | Performed by: STUDENT IN AN ORGANIZED HEALTH CARE EDUCATION/TRAINING PROGRAM

## 2021-11-11 PROCEDURE — 3008F PR BODY MASS INDEX (BMI) DOCUMENTED: ICD-10-PCS | Mod: CPTII,S$GLB,, | Performed by: STUDENT IN AN ORGANIZED HEALTH CARE EDUCATION/TRAINING PROGRAM

## 2021-11-11 PROCEDURE — 72050 X-RAY EXAM NECK SPINE 4/5VWS: CPT | Mod: 26,,, | Performed by: RADIOLOGY

## 2021-11-11 PROCEDURE — 99214 OFFICE O/P EST MOD 30 MIN: CPT | Mod: S$GLB,,, | Performed by: STUDENT IN AN ORGANIZED HEALTH CARE EDUCATION/TRAINING PROGRAM

## 2021-11-11 PROCEDURE — 3079F DIAST BP 80-89 MM HG: CPT | Mod: CPTII,S$GLB,, | Performed by: STUDENT IN AN ORGANIZED HEALTH CARE EDUCATION/TRAINING PROGRAM

## 2021-11-11 PROCEDURE — 3074F SYST BP LT 130 MM HG: CPT | Mod: CPTII,S$GLB,, | Performed by: STUDENT IN AN ORGANIZED HEALTH CARE EDUCATION/TRAINING PROGRAM

## 2021-11-11 PROCEDURE — 72050 X-RAY EXAM NECK SPINE 4/5VWS: CPT | Mod: TC,FY,PO

## 2021-11-13 ENCOUNTER — PATIENT MESSAGE (OUTPATIENT)
Dept: NEUROSURGERY | Facility: CLINIC | Age: 26
End: 2021-11-13
Payer: COMMERCIAL

## 2021-11-13 DIAGNOSIS — M54.12 CERVICAL RADICULOPATHY: Primary | ICD-10-CM

## 2021-11-13 DIAGNOSIS — M48.02 CERVICAL SPINAL STENOSIS: ICD-10-CM

## 2021-11-19 ENCOUNTER — HOSPITAL ENCOUNTER (OUTPATIENT)
Dept: RADIOLOGY | Facility: HOSPITAL | Age: 26
Discharge: HOME | End: 2021-11-19
Attending: PHYSICIAN ASSISTANT
Payer: COMMERCIAL

## 2021-11-19 ENCOUNTER — PATIENT OUTREACH (OUTPATIENT)
Dept: ADMINISTRATIVE | Facility: OTHER | Age: 26
End: 2021-11-19
Payer: COMMERCIAL

## 2021-11-19 DIAGNOSIS — M48.02 CERVICAL SPINAL STENOSIS: ICD-10-CM

## 2021-11-19 DIAGNOSIS — M54.12 CERVICAL RADICULOPATHY: ICD-10-CM

## 2021-11-19 PROCEDURE — 72125 CT NECK SPINE W/O DYE: CPT | Mod: 26,,, | Performed by: RADIOLOGY

## 2021-11-19 PROCEDURE — 72125 CT NECK SPINE W/O DYE: CPT | Mod: TC,PO

## 2021-11-19 PROCEDURE — 72125 CT CERVICAL SPINE WITHOUT CONTRAST: ICD-10-PCS | Mod: 26,,, | Performed by: RADIOLOGY

## 2021-11-22 ENCOUNTER — OFFICE VISIT (OUTPATIENT)
Dept: PAIN MEDICINE | Facility: CLINIC | Age: 26
End: 2021-11-22
Payer: COMMERCIAL

## 2021-11-22 VITALS
DIASTOLIC BLOOD PRESSURE: 65 MMHG | HEART RATE: 82 BPM | HEIGHT: 63 IN | BODY MASS INDEX: 38.95 KG/M2 | WEIGHT: 219.81 LBS | OXYGEN SATURATION: 96 % | SYSTOLIC BLOOD PRESSURE: 116 MMHG

## 2021-11-22 DIAGNOSIS — M50.30 DDD (DEGENERATIVE DISC DISEASE), CERVICAL: ICD-10-CM

## 2021-11-22 DIAGNOSIS — M54.12 CERVICAL RADICULOPATHY: ICD-10-CM

## 2021-11-22 DIAGNOSIS — M51.36 DDD (DEGENERATIVE DISC DISEASE), LUMBAR: ICD-10-CM

## 2021-11-22 DIAGNOSIS — M48.02 CERVICAL SPINAL STENOSIS: Primary | ICD-10-CM

## 2021-11-22 PROCEDURE — 99999 PR PBB SHADOW E&M-EST. PATIENT-LVL IV: ICD-10-PCS | Mod: PBBFAC,,, | Performed by: PHYSICIAN ASSISTANT

## 2021-11-22 PROCEDURE — 99213 PR OFFICE/OUTPT VISIT, EST, LEVL III, 20-29 MIN: ICD-10-PCS | Mod: S$GLB,,, | Performed by: PHYSICIAN ASSISTANT

## 2021-11-22 PROCEDURE — 99999 PR PBB SHADOW E&M-EST. PATIENT-LVL IV: CPT | Mod: PBBFAC,,, | Performed by: PHYSICIAN ASSISTANT

## 2021-11-22 PROCEDURE — 99213 OFFICE O/P EST LOW 20 MIN: CPT | Mod: S$GLB,,, | Performed by: PHYSICIAN ASSISTANT

## 2021-11-22 RX ORDER — BUSPIRONE HYDROCHLORIDE 15 MG/1
15 TABLET ORAL NIGHTLY
COMMUNITY
Start: 2021-10-28 | End: 2022-03-24

## 2021-11-22 RX ORDER — METHOCARBAMOL 750 MG/1
750 TABLET, FILM COATED ORAL 2 TIMES DAILY PRN
Qty: 60 TABLET | Refills: 5 | Status: ON HOLD | OUTPATIENT
Start: 2021-11-22 | End: 2021-12-13 | Stop reason: SDUPTHER

## 2021-11-30 ENCOUNTER — PATIENT MESSAGE (OUTPATIENT)
Dept: NEUROSURGERY | Facility: CLINIC | Age: 26
End: 2021-11-30
Payer: COMMERCIAL

## 2021-12-07 ENCOUNTER — OFFICE VISIT (OUTPATIENT)
Dept: PAIN MEDICINE | Facility: CLINIC | Age: 26
End: 2021-12-07
Payer: COMMERCIAL

## 2021-12-07 VITALS
BODY MASS INDEX: 38.7 KG/M2 | OXYGEN SATURATION: 97 % | TEMPERATURE: 98 F | RESPIRATION RATE: 18 BRPM | SYSTOLIC BLOOD PRESSURE: 119 MMHG | DIASTOLIC BLOOD PRESSURE: 69 MMHG | HEART RATE: 95 BPM | WEIGHT: 218.5 LBS

## 2021-12-07 DIAGNOSIS — M54.16 LUMBAR RADICULOPATHY: ICD-10-CM

## 2021-12-07 DIAGNOSIS — M51.36 DDD (DEGENERATIVE DISC DISEASE), LUMBAR: ICD-10-CM

## 2021-12-07 DIAGNOSIS — M54.12 CERVICAL RADICULOPATHY: Primary | ICD-10-CM

## 2021-12-07 DIAGNOSIS — M48.02 CERVICAL SPINAL STENOSIS: Primary | ICD-10-CM

## 2021-12-07 DIAGNOSIS — M50.30 DDD (DEGENERATIVE DISC DISEASE), CERVICAL: ICD-10-CM

## 2021-12-07 PROCEDURE — 99214 OFFICE O/P EST MOD 30 MIN: CPT | Mod: S$GLB,,, | Performed by: PHYSICIAN ASSISTANT

## 2021-12-07 PROCEDURE — 99999 PR PBB SHADOW E&M-EST. PATIENT-LVL IV: ICD-10-PCS | Mod: PBBFAC,,, | Performed by: PHYSICIAN ASSISTANT

## 2021-12-07 PROCEDURE — 99214 PR OFFICE/OUTPT VISIT, EST, LEVL IV, 30-39 MIN: ICD-10-PCS | Mod: S$GLB,,, | Performed by: PHYSICIAN ASSISTANT

## 2021-12-07 PROCEDURE — 99999 PR PBB SHADOW E&M-EST. PATIENT-LVL IV: CPT | Mod: PBBFAC,,, | Performed by: PHYSICIAN ASSISTANT

## 2021-12-07 RX ORDER — LIDOCAINE HYDROCHLORIDE 10 MG/ML
1 INJECTION, SOLUTION EPIDURAL; INFILTRATION; INTRACAUDAL; PERINEURAL ONCE
Status: CANCELLED | OUTPATIENT
Start: 2021-12-07 | End: 2021-12-07

## 2021-12-07 RX ORDER — SODIUM CHLORIDE, SODIUM LACTATE, POTASSIUM CHLORIDE, CALCIUM CHLORIDE 600; 310; 30; 20 MG/100ML; MG/100ML; MG/100ML; MG/100ML
INJECTION, SOLUTION INTRAVENOUS CONTINUOUS
Status: CANCELLED | OUTPATIENT
Start: 2021-12-07

## 2021-12-13 ENCOUNTER — TELEPHONE (OUTPATIENT)
Dept: PAIN MEDICINE | Facility: CLINIC | Age: 26
End: 2021-12-13
Payer: COMMERCIAL

## 2021-12-13 DIAGNOSIS — Z98.1 S/P CERVICAL SPINAL FUSION: Primary | ICD-10-CM

## 2021-12-13 DIAGNOSIS — Q79.60 EHLERS-DANLOS SYNDROME: Primary | ICD-10-CM

## 2021-12-27 RX ORDER — METHOCARBAMOL 750 MG/1
750 TABLET, FILM COATED ORAL 2 TIMES DAILY PRN
Qty: 30 TABLET | Refills: 0 | Status: SHIPPED | OUTPATIENT
Start: 2021-12-27 | End: 2022-03-09

## 2021-12-27 RX ORDER — OXYCODONE AND ACETAMINOPHEN 7.5; 325 MG/1; MG/1
1 TABLET ORAL EVERY 6 HOURS PRN
Qty: 28 TABLET | Refills: 0 | Status: SHIPPED | OUTPATIENT
Start: 2021-12-27 | End: 2022-01-11 | Stop reason: SDUPTHER

## 2022-01-03 ENCOUNTER — TELEPHONE (OUTPATIENT)
Dept: NEUROSURGERY | Facility: CLINIC | Age: 27
End: 2022-01-03
Payer: COMMERCIAL

## 2022-01-03 NOTE — TELEPHONE ENCOUNTER
Patient called to inform office that she had tested positive for covid. Concerned that virus would delay healing. Advised that virus should not delay healing. Informed patient to call back if there were any further concerns.

## 2022-01-11 RX ORDER — OXYCODONE AND ACETAMINOPHEN 7.5; 325 MG/1; MG/1
1 TABLET ORAL EVERY 8 HOURS PRN
Qty: 21 TABLET | Refills: 0 | Status: SHIPPED | OUTPATIENT
Start: 2022-01-11 | End: 2022-03-09

## 2022-01-11 NOTE — TELEPHONE ENCOUNTER
----- Message from Tamar Alcazar sent at 1/11/2022 12:37 PM CST -----  Regarding: Refill  Contact: patient  Type:  RX Refill Request    Who Called:  Patient  Refill or New Rx:  Refill  RX Name and Strength:  oxyCODONE-acetaminophen (PERCOCET) 7.5-325 mg per tablet  How is the patient currently taking it? (ex. 1XDay):  2-3 x Day  Is this a 30 day or 90 day RX:  30  Preferred Pharmacy with phone number:    [id number] - [place], [place] - [address]  [address] [address]  Phone: [phone] Fax: [phone]  Local or Mail Order:  local  Ordering Provider:  Dr. Quinn Pond Call Back Number:  [phone] (home)   Additional Information:  Please contact the patient to advise. Thanks!

## 2022-01-26 ENCOUNTER — HOSPITAL ENCOUNTER (OUTPATIENT)
Dept: RADIOLOGY | Facility: HOSPITAL | Age: 27
Discharge: HOME | End: 2022-01-26
Attending: STUDENT IN AN ORGANIZED HEALTH CARE EDUCATION/TRAINING PROGRAM
Payer: COMMERCIAL

## 2022-01-26 ENCOUNTER — TELEPHONE (OUTPATIENT)
Dept: ORTHOPEDICS | Facility: CLINIC | Age: 27
End: 2022-01-26

## 2022-01-26 ENCOUNTER — OFFICE VISIT (OUTPATIENT)
Dept: NEUROSURGERY | Facility: CLINIC | Age: 27
End: 2022-01-26
Payer: COMMERCIAL

## 2022-01-26 VITALS
WEIGHT: 218.06 LBS | RESPIRATION RATE: 18 BRPM | DIASTOLIC BLOOD PRESSURE: 74 MMHG | SYSTOLIC BLOOD PRESSURE: 117 MMHG | HEART RATE: 93 BPM | HEIGHT: 63 IN | BODY MASS INDEX: 38.64 KG/M2

## 2022-01-26 DIAGNOSIS — M25.519 SHOULDER PAIN, UNSPECIFIED CHRONICITY, UNSPECIFIED LATERALITY: ICD-10-CM

## 2022-01-26 DIAGNOSIS — Z98.1 S/P CERVICAL SPINAL FUSION: Primary | ICD-10-CM

## 2022-01-26 DIAGNOSIS — Z98.1 S/P CERVICAL SPINAL FUSION: ICD-10-CM

## 2022-01-26 PROCEDURE — 72040 X-RAY EXAM NECK SPINE 2-3 VW: CPT | Mod: TC,FY,PO

## 2022-01-26 PROCEDURE — 1159F MED LIST DOCD IN RCRD: CPT | Mod: CPTII,S$GLB,, | Performed by: STUDENT IN AN ORGANIZED HEALTH CARE EDUCATION/TRAINING PROGRAM

## 2022-01-26 PROCEDURE — 3008F BODY MASS INDEX DOCD: CPT | Mod: CPTII,S$GLB,, | Performed by: STUDENT IN AN ORGANIZED HEALTH CARE EDUCATION/TRAINING PROGRAM

## 2022-01-26 PROCEDURE — 3078F PR MOST RECENT DIASTOLIC BLOOD PRESSURE < 80 MM HG: ICD-10-PCS | Mod: CPTII,S$GLB,, | Performed by: STUDENT IN AN ORGANIZED HEALTH CARE EDUCATION/TRAINING PROGRAM

## 2022-01-26 PROCEDURE — 99024 POSTOP FOLLOW-UP VISIT: CPT | Mod: S$GLB,,, | Performed by: STUDENT IN AN ORGANIZED HEALTH CARE EDUCATION/TRAINING PROGRAM

## 2022-01-26 PROCEDURE — 3074F SYST BP LT 130 MM HG: CPT | Mod: CPTII,S$GLB,, | Performed by: STUDENT IN AN ORGANIZED HEALTH CARE EDUCATION/TRAINING PROGRAM

## 2022-01-26 PROCEDURE — 3008F PR BODY MASS INDEX (BMI) DOCUMENTED: ICD-10-PCS | Mod: CPTII,S$GLB,, | Performed by: STUDENT IN AN ORGANIZED HEALTH CARE EDUCATION/TRAINING PROGRAM

## 2022-01-26 PROCEDURE — 72040 X-RAY EXAM NECK SPINE 2-3 VW: CPT | Mod: 26,,, | Performed by: RADIOLOGY

## 2022-01-26 PROCEDURE — 99024 PR POST-OP FOLLOW-UP VISIT: ICD-10-PCS | Mod: S$GLB,,, | Performed by: STUDENT IN AN ORGANIZED HEALTH CARE EDUCATION/TRAINING PROGRAM

## 2022-01-26 PROCEDURE — 3074F PR MOST RECENT SYSTOLIC BLOOD PRESSURE < 130 MM HG: ICD-10-PCS | Mod: CPTII,S$GLB,, | Performed by: STUDENT IN AN ORGANIZED HEALTH CARE EDUCATION/TRAINING PROGRAM

## 2022-01-26 PROCEDURE — 72040 XR CERVICAL SPINE AP LATERAL: ICD-10-PCS | Mod: 26,,, | Performed by: RADIOLOGY

## 2022-01-26 PROCEDURE — 3078F DIAST BP <80 MM HG: CPT | Mod: CPTII,S$GLB,, | Performed by: STUDENT IN AN ORGANIZED HEALTH CARE EDUCATION/TRAINING PROGRAM

## 2022-01-26 PROCEDURE — 1159F PR MEDICATION LIST DOCUMENTED IN MEDICAL RECORD: ICD-10-PCS | Mod: CPTII,S$GLB,, | Performed by: STUDENT IN AN ORGANIZED HEALTH CARE EDUCATION/TRAINING PROGRAM

## 2022-01-26 NOTE — PROGRESS NOTES
"REFERRING PHYSICIAN:    No ref. provider found  N/A    Postoperative visit     CLINICAL PROGRESS:   Renetta Durham is now  6 weeks s/p C5-6 acdf  Improved UE sensory deficit and strength     Right C6 radiculopathy resolved      She has dealt with multiple co-morbidities  HA, neck pain, dx fibromylalgia  Lower back pain  Has worked with Pain mg sees Dr. Jacques, [place]     Now notes new left shoulder pain, TTP worse with shoulder motion       Preop:  History of Present Illness:  "Renetta Durham is a 26 y.o. right handed female with anxiety, depression, fibromyalgia, headaches who presents for evaluation of neck and arm pain. Patient reports onset 1 year ago. The pain is present in the posterior neck and is associated with headaches. She reports bilateral arm pain, numbness, tingling. She reports weakness in all extremities. She drops objects and has difficulty with hand dexterity. She reports gait imbalance. She has failed to improve with physical therapy and injections."    MEDICATIONS:                   List reviewed, see chart for dosing details.    ALLERGIES:       Review of patient's allergies indicates:   Allergen Reactions    No known drug allergies        PHYSICAL EXAMINATION:          VITAL SIGNS:   Resp 18   Ht 5' 3" (1.6 m)   Wt 98.9 kg (218 lb 0.6 oz)   BMI 38.62 kg/m²     GENERAL:  Patient is well developed, well nourished, calm, collected, and in no apparent distress.  Incision is healed    NEUROLOGICAL:        Motor Strength: Moves all extremities spontaneously with good tone. No abnormal movements seen.      Strength   Deltoids Triceps Biceps Wrist Extension Wrist Flexion Hand  Interossei   Upper: R 5/5 5/5 5/5 5/5 5/5 5/5 5/5     L 5/5 5/5 5/5 5/5 5/5 5/5 5/5       Iliopsoas Quadriceps Knee  Flexion Tibialis  anterior Gastro- cnemius EHL     Lower: R 5/5 5/5 5/5 5/5 5/5 5/5       L 5/5 5/5 5/5 5/5 5/5 5/5        DTR's: 3+  Villarreal: present   Gait: normal                  Tandem Gait: " abnormal           Able to walk on heels & toes  Cervical Spine: full ROM     Left shoulder TTP anterior   Pain with internal rotation        Gait is intact    IMAGING DATA:  Stable alignment, intact hardware without subsidence      No flowsheet data found.      ASSESSMENT/PLAN:  Improved myeloradiculopathy s/ p    acdf  Doing well  Had chronic neck pain, myofascial /HA which will require time, PT to maximize long term spine and joint health  fibromyalia  Reviewed activity escalation, goals ,s/s requiring re-evalaution, asd  She has non specific back pain with mild DDD L4-S1, was discussion tx with Pain mg and would like to return   Has been struggling with left shoulder, if no better will see ortho for eval  6 mo xray               Advised to call the office Iif any questions or concerns arise.    This note was partially dictated using voice recognition software, so please excuse any errors that were not corrected.

## 2022-01-27 DIAGNOSIS — Z30.9 ENCOUNTER FOR CONTRACEPTIVE MANAGEMENT, UNSPECIFIED TYPE: ICD-10-CM

## 2022-01-27 RX ORDER — NORGESTIMATE AND ETHINYL ESTRADIOL 0.25-0.035
KIT ORAL
Qty: 28 TABLET | Refills: 3 | Status: SHIPPED | OUTPATIENT
Start: 2022-01-27 | End: 2022-05-18 | Stop reason: SDUPTHER

## 2022-02-16 ENCOUNTER — HOSPITAL ENCOUNTER (OUTPATIENT)
Dept: RADIOLOGY | Facility: HOSPITAL | Age: 27
Discharge: HOME | End: 2022-02-16
Attending: ORTHOPAEDIC SURGERY
Payer: COMMERCIAL

## 2022-02-16 ENCOUNTER — OFFICE VISIT (OUTPATIENT)
Dept: ORTHOPEDICS | Facility: CLINIC | Age: 27
End: 2022-02-16
Payer: COMMERCIAL

## 2022-02-16 VITALS — HEIGHT: 63 IN | WEIGHT: 218 LBS | BODY MASS INDEX: 38.62 KG/M2 | RESPIRATION RATE: 18 BRPM

## 2022-02-16 DIAGNOSIS — M25.519 SHOULDER PAIN, UNSPECIFIED CHRONICITY, UNSPECIFIED LATERALITY: ICD-10-CM

## 2022-02-16 DIAGNOSIS — M89.8X1 PAIN OF LEFT SCAPULA: Primary | ICD-10-CM

## 2022-02-16 DIAGNOSIS — M25.519 SHOULDER PAIN, UNSPECIFIED CHRONICITY, UNSPECIFIED LATERALITY: Primary | ICD-10-CM

## 2022-02-16 PROCEDURE — 3008F BODY MASS INDEX DOCD: CPT | Mod: CPTII,S$GLB,, | Performed by: ORTHOPAEDIC SURGERY

## 2022-02-16 PROCEDURE — 99213 OFFICE O/P EST LOW 20 MIN: CPT | Mod: S$GLB,,, | Performed by: ORTHOPAEDIC SURGERY

## 2022-02-16 PROCEDURE — 99999 PR PBB SHADOW E&M-EST. PATIENT-LVL IV: CPT | Mod: PBBFAC,,, | Performed by: ORTHOPAEDIC SURGERY

## 2022-02-16 PROCEDURE — 73030 XR SHOULDER TRAUMA 3 VIEW RIGHT: ICD-10-PCS | Mod: 26,LT,, | Performed by: RADIOLOGY

## 2022-02-16 PROCEDURE — 1160F RVW MEDS BY RX/DR IN RCRD: CPT | Mod: CPTII,S$GLB,, | Performed by: ORTHOPAEDIC SURGERY

## 2022-02-16 PROCEDURE — 1159F MED LIST DOCD IN RCRD: CPT | Mod: CPTII,S$GLB,, | Performed by: ORTHOPAEDIC SURGERY

## 2022-02-16 PROCEDURE — 99213 PR OFFICE/OUTPT VISIT, EST, LEVL III, 20-29 MIN: ICD-10-PCS | Mod: S$GLB,,, | Performed by: ORTHOPAEDIC SURGERY

## 2022-02-16 PROCEDURE — 99999 PR PBB SHADOW E&M-EST. PATIENT-LVL IV: ICD-10-PCS | Mod: PBBFAC,,, | Performed by: ORTHOPAEDIC SURGERY

## 2022-02-16 PROCEDURE — 73030 X-RAY EXAM OF SHOULDER: CPT | Mod: TC,PO,RT

## 2022-02-16 PROCEDURE — 3008F PR BODY MASS INDEX (BMI) DOCUMENTED: ICD-10-PCS | Mod: CPTII,S$GLB,, | Performed by: ORTHOPAEDIC SURGERY

## 2022-02-16 PROCEDURE — 73030 X-RAY EXAM OF SHOULDER: CPT | Mod: 26,LT,, | Performed by: RADIOLOGY

## 2022-02-16 PROCEDURE — 1159F PR MEDICATION LIST DOCUMENTED IN MEDICAL RECORD: ICD-10-PCS | Mod: CPTII,S$GLB,, | Performed by: ORTHOPAEDIC SURGERY

## 2022-02-16 PROCEDURE — 1160F PR REVIEW ALL MEDS BY PRESCRIBER/CLIN PHARMACIST DOCUMENTED: ICD-10-PCS | Mod: CPTII,S$GLB,, | Performed by: ORTHOPAEDIC SURGERY

## 2022-02-16 NOTE — PROGRESS NOTES
Past Medical History:   Diagnosis Date    Anxiety     Asthma     Cervical radiculopathy 12/2021    Depression     Fibromyalgia     GERD (gastroesophageal reflux disease)     Headache(784.0)     Migraine     Narcolepsy     Obesity, Class II, BMI 35-39.9, no comorbidity 5/7/2016    Optic nerve edema     Sleep apnea     no cpap    Urinary tract infection     Vision abnormalities     glasses       Past Surgical History:   Procedure Laterality Date    ANTERIOR CERVICAL DISCECTOMY W/ FUSION N/A 12/13/2021    Procedure: DISCECTOMY, SPINE, CERVICAL, ANTERIOR APPROACH, WITH FUSION, C6-7;  Surgeon: Deven Ball MD;  Location: [facility] OR;  Service: Neurosurgery;  Laterality: N/A;    EPIDURAL STEROID INJECTION INTO CERVICAL SPINE N/A 10/11/2021    Procedure: Injection-steroid-epidural-cervical;  Surgeon: Charlie Jacques MD;  Location: [facility] OR;  Service: Pain Management;  Laterality: N/A;    WISDOM TOOTH EXTRACTION         Current Outpatient Medications   Medication Sig    busPIRone (BUSPAR) 15 MG tablet Take 15 mg by mouth every evening.    dextroamphetamine-amphetamine (ADDERALL XR) 30 MG 24 hr capsule Take by mouth every morning.    DULoxetine 40 mg CpDR Take 1 capsule by mouth every evening.    ESTARYLLA 0.25-35 mg-mcg per tablet TAKE 1 TABLET BY MOUTH EVERY DAY    fluticasone (FLONASE) 50 mcg/actuation nasal spray 1 spray by Each Nare route once daily. (Patient taking differently: 1 spray by Each Nostril route daily as needed.)    gabapentin (NEURONTIN) 300 MG capsule 300mg in AM, 300mg noon  and 600mg at HS    methocarbamoL (ROBAXIN) 750 MG Tab Take 1 tablet (750 mg total) by mouth 2 (two) times daily as needed (muscle spasm).    oxyCODONE-acetaminophen (PERCOCET) 7.5-325 mg per tablet Take 1 tablet by mouth every 8 (eight) hours as needed for Pain.    pantoprazole (PROTONIX) 40 MG tablet Take 1 tablet (40 mg total) by mouth Daily. (Patient taking differently: Take 40 mg by mouth every  evening.)    rimegepant (NURTEC) 75 mg odt Take 1 tablet (75 mg total) by mouth daily as needed. Place ODT tablet on the tongue; alternatively the ODT tablet may be placed under the tongue    UNABLE TO FIND Physical Therapy - evaluate and treat this patient with EDS  Dr. Mildred Philippe [phone]     Current Facility-Administered Medications   Medication    onabotulinumtoxina injection 200 Units       Review of patient's allergies indicates:   Allergen Reactions    No known drug allergies        Family History   Problem Relation Age of Onset    Mental illness Other     Arthritis Mother     Asthma Sister         half sister    Miscarriages / Stillbirths Sister         half sister    Vision loss Maternal Aunt     Cancer Maternal Aunt     Arthritis Maternal Grandmother     Cataracts Maternal Grandmother     Cancer Maternal Grandfather         colon, liver, lungs    Diabetes Paternal Grandmother     Heart disease Paternal Grandmother     Hypertension Paternal Grandmother     COPD Paternal Grandfather     Ulcerative colitis Father     Other Father         recurrent staff infections    Amblyopia Neg Hx     Blindness Neg Hx     Glaucoma Neg Hx     Macular degeneration Neg Hx     Retinal detachment Neg Hx     Strabismus Neg Hx     Stroke Neg Hx     Thyroid disease Neg Hx        Social History     Socioeconomic History    Marital status: Single   Tobacco Use    Smoking status: Never Smoker    Smokeless tobacco: Never Used   Substance and Sexual Activity    Alcohol use: Yes     Comment: rarely    Drug use: No    Sexual activity: Yes     Partners: Male     Birth control/protection: Condom, OCP       Chief Complaint:   Chief Complaint   Patient presents with    Shoulder Pain     NP       History of present illness:  Is a 26-year-old right-hand-dominant female seen in consultation for Dr. Ball for left shoulder pain.  Patient has some pain around the posterior aspect of her shoulder blade  with possibly some mild winging and dyskinesia.  Patient had a history of severe of disc protrusion at C6-7 causing some myelopathy and bilateral foraminal stenosis.  Had a an ACDF on December 13, 2021. Has not done physical therapy since the surgery.  She has had pain in that arm for about 6 months so it existed prior to the surgery.  It is a constant pain that is described as a dull ache and feels like that arm is a little weaker.  Rates the pain is 7/10.      Review of Systems:    Constitution: Negative for chills, fever, and sweats.  Negative for unexplained weight loss.    HENT:  Negative for headaches and blurry vision.    Cardiovascular:Negative for chest pain or irregular heart beat. Negative for hypertension.    Respiratory:  Negative for cough and shortness of breath.    Gastrointestinal: Negative for abdominal pain, heartburn, melena, nausea, and vomitting.    Genitourinary:  Negative bladder incontinence and dysuria.    Musculoskeletal:  See HPI    Neurological: Negative for numbness.    Psychiatric/Behavioral: Negative for depression.  The patient is not nervous/anxious.      Endocrine: Negative for polyuria    Hematologic/Lymphatic: Negative for bleeding problem.  Does not bruise/bleed easily.    Skin: Negative for poor would healing and rash      Physical Examination:    Vital Signs:    Vitals:    02/16/22 1531   Resp: 18       Body mass index is 38.62 kg/m².    This a well-developed, well nourished patient in no acute distress.  They are alert and oriented and cooperative to examination.  Pt. walks without an antalgic gait.      Examination of the left shoulder shows no rashes or erythema. There are no masses, ecchymosis, or atrophy. The patient has full range of motion in forward flexion, external rotation, and internal rotation to the mid T-spine. The patient has - Vaughan test. - Neer - Heard's test. - Speeds test. Nontender to palpation over a.c. joint. Normal stability anteriorly, posteriorly,  and negative sulcus sign. Passive range of motion: Forward flexion of 180°, external rotation at 90° of 90°, internal rotation of 50°, and external rotation at 0° of 50°. 2+ radial pulse. Intact axillary, radial, median and ulnar sensation. 5 out of 5 resisted forward flexion, external rotation, and negative lift off test.  Mild medial scapular pain with some protraction and mild lateral winging.      X-rays:  X-ray of the left shoulder is ordered and reviewed which show prior ACDF.  No problem with the shoulder noted.     Assessment::  Left scapular winging and dyskinesia    Plan:  I reviewed the findings with her today.  I think she just has some scapular weakness in the areas of the rhomboids and serratus anterior.  I recommended some formal physical therapy for scapular balancing.  She has great strength in her shoulders and arm.  I think some of this is from the chronic foraminal stenosis and disuse of her arm in a normal manner.  Follow-up in 8 weeks to see if it is improving.    This note was created using [employer] voice recognition software that occasionally misinterpreted phrases or words.    Consult note is delivered via Epic messaging service.

## 2022-03-09 ENCOUNTER — OFFICE VISIT (OUTPATIENT)
Dept: PAIN MEDICINE | Facility: CLINIC | Age: 27
End: 2022-03-09
Payer: COMMERCIAL

## 2022-03-09 VITALS
WEIGHT: 224.31 LBS | HEART RATE: 102 BPM | DIASTOLIC BLOOD PRESSURE: 73 MMHG | SYSTOLIC BLOOD PRESSURE: 120 MMHG | BODY MASS INDEX: 39.75 KG/M2 | HEIGHT: 63 IN

## 2022-03-09 DIAGNOSIS — M62.838 MUSCLE SPASM: ICD-10-CM

## 2022-03-09 DIAGNOSIS — M51.36 DDD (DEGENERATIVE DISC DISEASE), LUMBAR: ICD-10-CM

## 2022-03-09 DIAGNOSIS — M54.16 LUMBAR RADICULOPATHY: Primary | ICD-10-CM

## 2022-03-09 DIAGNOSIS — Z01.818 PRE-OP TESTING: ICD-10-CM

## 2022-03-09 DIAGNOSIS — M50.30 DDD (DEGENERATIVE DISC DISEASE), CERVICAL: ICD-10-CM

## 2022-03-09 PROCEDURE — 99214 PR OFFICE/OUTPT VISIT, EST, LEVL IV, 30-39 MIN: ICD-10-PCS | Mod: CS,S$GLB,, | Performed by: PHYSICIAN ASSISTANT

## 2022-03-09 PROCEDURE — 3074F PR MOST RECENT SYSTOLIC BLOOD PRESSURE < 130 MM HG: ICD-10-PCS | Mod: CPTII,S$GLB,, | Performed by: PHYSICIAN ASSISTANT

## 2022-03-09 PROCEDURE — 99999 PR PBB SHADOW E&M-EST. PATIENT-LVL IV: CPT | Mod: PBBFAC,,, | Performed by: PHYSICIAN ASSISTANT

## 2022-03-09 PROCEDURE — 1160F RVW MEDS BY RX/DR IN RCRD: CPT | Mod: CPTII,S$GLB,, | Performed by: PHYSICIAN ASSISTANT

## 2022-03-09 PROCEDURE — 1159F MED LIST DOCD IN RCRD: CPT | Mod: CPTII,S$GLB,, | Performed by: PHYSICIAN ASSISTANT

## 2022-03-09 PROCEDURE — 1159F PR MEDICATION LIST DOCUMENTED IN MEDICAL RECORD: ICD-10-PCS | Mod: CPTII,S$GLB,, | Performed by: PHYSICIAN ASSISTANT

## 2022-03-09 PROCEDURE — 3008F PR BODY MASS INDEX (BMI) DOCUMENTED: ICD-10-PCS | Mod: CPTII,S$GLB,, | Performed by: PHYSICIAN ASSISTANT

## 2022-03-09 PROCEDURE — 99999 PR PBB SHADOW E&M-EST. PATIENT-LVL IV: ICD-10-PCS | Mod: PBBFAC,,, | Performed by: PHYSICIAN ASSISTANT

## 2022-03-09 PROCEDURE — 3008F BODY MASS INDEX DOCD: CPT | Mod: CPTII,S$GLB,, | Performed by: PHYSICIAN ASSISTANT

## 2022-03-09 PROCEDURE — 3078F DIAST BP <80 MM HG: CPT | Mod: CPTII,S$GLB,, | Performed by: PHYSICIAN ASSISTANT

## 2022-03-09 PROCEDURE — 3074F SYST BP LT 130 MM HG: CPT | Mod: CPTII,S$GLB,, | Performed by: PHYSICIAN ASSISTANT

## 2022-03-09 PROCEDURE — 3078F PR MOST RECENT DIASTOLIC BLOOD PRESSURE < 80 MM HG: ICD-10-PCS | Mod: CPTII,S$GLB,, | Performed by: PHYSICIAN ASSISTANT

## 2022-03-09 PROCEDURE — 1160F PR REVIEW ALL MEDS BY PRESCRIBER/CLIN PHARMACIST DOCUMENTED: ICD-10-PCS | Mod: CPTII,S$GLB,, | Performed by: PHYSICIAN ASSISTANT

## 2022-03-09 PROCEDURE — 99214 OFFICE O/P EST MOD 30 MIN: CPT | Mod: CS,S$GLB,, | Performed by: PHYSICIAN ASSISTANT

## 2022-03-09 RX ORDER — ALPRAZOLAM 0.5 MG/1
1 TABLET, ORALLY DISINTEGRATING ORAL ONCE AS NEEDED
Status: CANCELLED | OUTPATIENT
Start: 2022-03-24 | End: 2033-08-19

## 2022-03-09 RX ORDER — TIZANIDINE 4 MG/1
4 TABLET ORAL 2 TIMES DAILY PRN
Qty: 60 TABLET | Refills: 2 | Status: SHIPPED | OUTPATIENT
Start: 2022-03-09 | End: 2022-04-08

## 2022-03-10 ENCOUNTER — PATIENT MESSAGE (OUTPATIENT)
Dept: RHEUMATOLOGY | Facility: CLINIC | Age: 27
End: 2022-03-10
Payer: COMMERCIAL

## 2022-03-11 ENCOUNTER — PATIENT MESSAGE (OUTPATIENT)
Dept: NEUROSURGERY | Facility: CLINIC | Age: 27
End: 2022-03-11
Payer: COMMERCIAL

## 2022-03-11 DIAGNOSIS — Z98.1 S/P CERVICAL SPINAL FUSION: Primary | ICD-10-CM

## 2022-03-14 NOTE — H&P (VIEW-ONLY)
This note was completed with dictation software and grammatical errors may exist.    CC:  Neck pain, low back pain    HPI:  The patient is a 26-year-old woman with a history of narcolepsy, TEA, fibromyalgia, presents in self-referral for neck pain.  She returns in follow-up today with neck pain and low back pain.  Since her last visit she underwent C6-7 ACDF with Dr. Ball on 12/13/2021.  She has had relief of her left upper extremity symptoms but does continue to still have some left-sided neck pain radiating behind her left ear, along her left collar bone and in her left scapular region.  She reports some weaning in her left scapula and has been working to strengthen this.  Her main complaint today is low back and leg pain.  She reports soreness in the sacrum and tailbone region with sitting as well as cramping across the low back and upper buttocks.  She reports pain occasionally radiating to the groin but more consistently radiating to the lateral hips and lateral thighs.  She reports numbness and tingling in her legs a few times a week when her pain is severe.  She does still have some weakness in her left upper extremity but this has significantly improved following her surgery.  She also reports improved numbness in her left upper extremity.  She denies bladder or bowel incontinence.    Previous history:  Patient reports having many years of neck pain, upper back pain, low back pain.  She has numbness and tingling in her left arm.  She has been diagnosed with fibromyalgia up but they had assessed her for Denzel-Danlos since she has family members who have this as well but apparently she did not meet all the diagnostic criteria.  Nonetheless she continues to have neck pain, worse to the right side in her right trapezius but numbness and tingling down the left arm into the hand.  She describes the pain is aching, shooting, tight, tingling.  Is worse with sitting, standing, bending, coughing or sneezing.  She  gets relief with rest.  She has been taking gabapentin 600 twice daily with slight relief, also has had some slight benefit with ibuprofen and Flexeril.  She denies any roma weakness but does report having issues with dropping things several times a week.  She denies any balance issues but did note that in her review of systems.    Pain intervention history:  She returns in follow-up today, she is status post cervical JOSUE on 10/11/2021 with 25% relief of the neck pain.    Spine surgeries:  C6-7 ACDF with Dr. Ball on 12/13/2021.     Antineuropathics:  Gabapentin 600 twice daily with moderate benefit  NSAIDs:  Taking ibuprofen with slight relief.  Physical therapy:  Recently done 6 weeks of physical therapy but reported no improvement in pain for her neck but may have had some worsening of symptoms  Antidepressants:  Duloxetine 40  Muscle relaxers:  Flexeril slight benefit  Opioids:  Antiplatelets/Anticoagulants:    ROS:  She reports fatigue, headaches, shortness of breath, stomach pain, urinary urgency and frequency, back pain, memory loss, dizziness, anxiety and loss of balance.  Balance of review of systems is negative.    Lab Results   Component Value Date    HGBA1C 5.5 12/09/2021       Lab Results   Component Value Date    WBC 6.37 12/09/2021    HGB 12.9 12/09/2021    HCT 39.6 12/09/2021    MCV 88 12/09/2021     12/09/2021             Past Medical History:   Diagnosis Date    Anxiety     Asthma     Cervical radiculopathy 12/2021    Depression     Fibromyalgia     GERD (gastroesophageal reflux disease)     Headache(784.0)     Migraine     Narcolepsy     Obesity, Class II, BMI 35-39.9, no comorbidity 5/7/2016    Optic nerve edema     Sleep apnea     no cpap    Urinary tract infection     Vision abnormalities     glasses       Past Surgical History:   Procedure Laterality Date    ANTERIOR CERVICAL DISCECTOMY W/ FUSION N/A 12/13/2021    Procedure: DISCECTOMY, SPINE, CERVICAL, ANTERIOR  "APPROACH, WITH FUSION, C6-7;  Surgeon: Deven Ball MD;  Location: [facility];  Service: Neurosurgery;  Laterality: N/A;    EPIDURAL STEROID INJECTION INTO CERVICAL SPINE N/A 10/11/2021    Procedure: Injection-steroid-epidural-cervical;  Surgeon: Charlie Jacques MD;  Location: [facility] OR;  Service: Pain Management;  Laterality: N/A;    WISDOM TOOTH EXTRACTION         Social History     Socioeconomic History    Marital status: Single   Tobacco Use    Smoking status: Never Smoker    Smokeless tobacco: Never Used   Substance and Sexual Activity    Alcohol use: Yes     Comment: rarely    Drug use: No    Sexual activity: Yes     Partners: Male     Birth control/protection: Condom, OCP         Medications/Allergies: See med card    Vitals:    03/09/22 1501   BP: 120/73   Pulse: 102   Weight: 101.8 kg (224 lb 5.1 oz)   Height: 5' 3" (1.6 m)   PainSc:   8   PainLoc: Shoulder         Physical exam:  Gen: A and O x3, pleasant, well-groomed  Skin: No rashes or obvious lesions  HEENT: PERRLA, no obvious deformities on ears or in canals.Trachea midline.  CVS: Regular rate and rhythm, normal palpable pulses.  Resp: Clear to auscultation bilaterally, no wheezes or rales.  Abdomen: Soft, NT/ND.  Musculoskeletal: Able to heel walk, toe walk. No antalgic gait.     Neuro:  Upper extremities: 5/5 strength bilaterally   Lower extremities:  5/5 strength bilaterally  Reflexes: Brachioradialis 3+ left, 2+ right, Bicep 3+ left, 2+ right, Tricep 3+ left, 2+ right. Patellar 3+ bilaterally, Achilles 2+ bilaterally  Sensory: Intact and symmetrical to light touch and pinprick in C2-T1 dermatomes bilaterally.    Cervical Spine:  Cervical spine: ROM is full in flexion, extension and lateral rotation with mild increased left neck pain during left lateral rotation.  Myofascial exam: Tenderness to palpation across the left cervical paraspinous muscles and left scapular region.    Lumbar spine:  Lumbar spine: ROM is full with flexion extension " and oblique extension with mild increased pain in the back with extension  Regis's test causes increased pain in the groin on either side  Supine straight leg raise is negative bilaterally.    Internal and external rotation of the hip causes no increased pain on either side.  Myofascial exam: No tenderness to palpation across lumbar paraspinous muscles.      Imagin20 MRI C-spine:  Discs: Mild degenerative disc desiccation at C2-3 through C6-7.  No significant disc space narrowing.   Cord: Flattening of the right ventral aspect of the cervical cord at the C6-7 level.  No definite focal cord signal abnormality.   Skull base and craniocervical junction: Normal.   Degenerative findings:   C2-C3: The disc is normal in configuration. Mild bilateral facet arthropathy.  There is no neural foraminal stenosis.  There is no spinal canal stenosis.   C3-C4: The disc is normal in configuration.  Mild bilateral, left greater than right, facet arthropathy.  There is no neural foraminal stenosis.  There is no spinal canal stenosis.   C4-C5: Minimal posterior disc osteophyte complex.  Mild bilateral, left greater than right, facet arthropathy.  Mild bilateral uncovertebral joint spurring.  There is no neural foraminal stenosis.  There is no spinal canal stenosis.   C5-C6: The disc is normal in configuration.  There is no facet arthropathy.  Mild left uncovertebral joint spurring.  Mild left neural foraminal stenosis.  There is no spinal canal stenosis.   C6-C7: Prominent right disc extrusion with partial extension into the right foramen, which flattens the right ventral cord surface and contributes to mild spinal canal stenosis and moderate right neural foraminal narrowing.  Mild bilateral facet arthropathy.  Mild right uncovertebral joint spurring.   C7-T1: The disc is normal in configuration.  There is no facet arthropathy.  There is no uncovertebral joint disease.  There is no neural foraminal stenosis.  There is no  spinal canal stenosis.     11/10/2021 MRI cervical spine  Limited intracranial evaluation demonstrates no large mass effect or fluid collection.  No gross soft tissue defect or significant susceptibility artifact is appreciated. Osseous alignment appears maintained with no diffuse acute abnormal marrow or central cord signal appreciated. There is some straightening of the curvature overall.  This may be positional versus muscular spasm.  There is disc space narrowing with decreased disc water signal similar overall at the C6-7 level. There is multilevel facet, uncovertebral hypertrophy.     C2-3 demonstrates no significant interval spinal canal or neural foraminal narrowing.     C3-4 demonstrates no significant interval spinal canal or neural foraminal narrowing.     C4-5 demonstrates no significant interval spinal canal or neural foraminal narrowing with some marginal dorsal disc bulging.     C5-6 demonstrates some marginal dorsal disc bulging with minimal lateral recess, inferior neural foraminal narrowing bilaterally.     C6-7 demonstrates some dorsal disc bulging with large protrusion appearance at the right lateral recess, neural foraminal origin indenting the adjacent thecal sac, cord.  There is some faint increased cord signal on the sagittal sequence suggestive early myelomalacia.  This contributes overall to mild left as well as moderate right neural foraminal origin narrowing.     C7-T1 demonstrates no significant interval spinal canal or neural foraminal narrowing.    11/29/2021 MRI lumbar spine  Five lumbar type vertebral bodies.  Straightening the normal lordotic curvature.  The alignment is normal.  The vertebral body heights are maintained without evidence for compression deformity, fracture, or subluxation.  Marrow signal pattern is normal.  Intervertebral disc space narrowing is identified at L4-5.  Disc desiccation is identified at L4-5 and L5-S1.     The conus medullaris is at the level of L1.   The proximal spinal cord is of normal caliber the distal nerve roots are normal.     T12-L1: Ligament flavum hypertrophy with degenerative change of facets.  No evidence for central canal stenosis or neural foraminal narrowing.     L1-L2: No significant abnormality.     L2-L3: Ligament flavum hypertrophy of subtle broad-based disc bulge without central canal stenosis or neural foraminal narrowing.     L3-L4: Subtle broad-based disc bulge with ligament flavum hypertrophy.  Indentation of the thecal sac without central canal stenosis or neural foraminal narrowing.     L4-L5: Broad-based disc bulge central disc component with indentation of the thecal sac.  No evidence for central canal stenosis.  Moderate bilateral neural foraminal narrowing with possible abutment of the exiting L4 nerve roots.     L5-S1: Broad-based disc bulge with central disc protrusion and increased T2 signal suggestive of annular fissure.  Indentation of the thecal sac without evidence for central canal stenosis.  Moderate to severe bilateral neural foraminal narrowing with abutment of the exiting L5 nerve roots.     The bilateral SI joints are normal.     The visualized hollow and solid viscera within normal limits.    Assessment:   The patient is a 26-year-old woman with a history of narcolepsy, TEA, fibromyalgia, presents in self-referral for neck pain.   1. Lumbar radiculopathy  Case Request Operating Room: Injection-steroid-epidural-lumbar L5/S1   2. DDD (degenerative disc disease), lumbar     3. DDD (degenerative disc disease), cervical     4. Muscle spasm     5. Pre-op testing  COVID-19 Routine Screening       Plan:  1. She continues to improve following her C6-7 ACDF and we discussed the importance of continued physical therapy.  2. In regards to her back and leg pain we discussed that her pain is likely due to L4/5 and L5/S1 where she has disc bulges and foraminal stenosis.  I am going to schedule her for an L5/S1 interlaminar epidural  steroid injection.  3. She has been taking Robaxin without significant relief so I will have her discontinue this and I provided a prescription for tizanidine.

## 2022-03-14 NOTE — PROGRESS NOTES
This note was completed with dictation software and grammatical errors may exist.    CC:  Neck pain, low back pain    HPI:  The patient is a 26-year-old woman with a history of narcolepsy, TEA, fibromyalgia, presents in self-referral for neck pain.  She returns in follow-up today with neck pain and low back pain.  Since her last visit she underwent C6-7 ACDF with Dr. Ball on 12/13/2021.  She has had relief of her left upper extremity symptoms but does continue to still have some left-sided neck pain radiating behind her left ear, along her left collar bone and in her left scapular region.  She reports some weaning in her left scapula and has been working to strengthen this.  Her main complaint today is low back and leg pain.  She reports soreness in the sacrum and tailbone region with sitting as well as cramping across the low back and upper buttocks.  She reports pain occasionally radiating to the groin but more consistently radiating to the lateral hips and lateral thighs.  She reports numbness and tingling in her legs a few times a week when her pain is severe.  She does still have some weakness in her left upper extremity but this has significantly improved following her surgery.  She also reports improved numbness in her left upper extremity.  She denies bladder or bowel incontinence.    Previous history:  Patient reports having many years of neck pain, upper back pain, low back pain.  She has numbness and tingling in her left arm.  She has been diagnosed with fibromyalgia up but they had assessed her for Denzel-Danlos since she has family members who have this as well but apparently she did not meet all the diagnostic criteria.  Nonetheless she continues to have neck pain, worse to the right side in her right trapezius but numbness and tingling down the left arm into the hand.  She describes the pain is aching, shooting, tight, tingling.  Is worse with sitting, standing, bending, coughing or sneezing.  She  gets relief with rest.  She has been taking gabapentin 600 twice daily with slight relief, also has had some slight benefit with ibuprofen and Flexeril.  She denies any roma weakness but does report having issues with dropping things several times a week.  She denies any balance issues but did note that in her review of systems.    Pain intervention history:  She returns in follow-up today, she is status post cervical JOSUE on 10/11/2021 with 25% relief of the neck pain.    Spine surgeries:  C6-7 ACDF with Dr. Ball on 12/13/2021.     Antineuropathics:  Gabapentin 600 twice daily with moderate benefit  NSAIDs:  Taking ibuprofen with slight relief.  Physical therapy:  Recently done 6 weeks of physical therapy but reported no improvement in pain for her neck but may have had some worsening of symptoms  Antidepressants:  Duloxetine 40  Muscle relaxers:  Flexeril slight benefit  Opioids:  Antiplatelets/Anticoagulants:    ROS:  She reports fatigue, headaches, shortness of breath, stomach pain, urinary urgency and frequency, back pain, memory loss, dizziness, anxiety and loss of balance.  Balance of review of systems is negative.    Lab Results   Component Value Date    HGBA1C 5.5 12/09/2021       Lab Results   Component Value Date    WBC 6.37 12/09/2021    HGB 12.9 12/09/2021    HCT 39.6 12/09/2021    MCV 88 12/09/2021     12/09/2021             Past Medical History:   Diagnosis Date    Anxiety     Asthma     Cervical radiculopathy 12/2021    Depression     Fibromyalgia     GERD (gastroesophageal reflux disease)     Headache(784.0)     Migraine     Narcolepsy     Obesity, Class II, BMI 35-39.9, no comorbidity 5/7/2016    Optic nerve edema     Sleep apnea     no cpap    Urinary tract infection     Vision abnormalities     glasses       Past Surgical History:   Procedure Laterality Date    ANTERIOR CERVICAL DISCECTOMY W/ FUSION N/A 12/13/2021    Procedure: DISCECTOMY, SPINE, CERVICAL, ANTERIOR  "APPROACH, WITH FUSION, C6-7;  Surgeon: Deven Ball MD;  Location: [facility] OR;  Service: Neurosurgery;  Laterality: N/A;    EPIDURAL STEROID INJECTION INTO CERVICAL SPINE N/A 10/11/2021    Procedure: Injection-steroid-epidural-cervical;  Surgeon: Charlie Jacques MD;  Location: [facility] OR;  Service: Pain Management;  Laterality: N/A;    WISDOM TOOTH EXTRACTION         Social History     Socioeconomic History    Marital status: Single   Tobacco Use    Smoking status: Never Smoker    Smokeless tobacco: Never Used   Substance and Sexual Activity    Alcohol use: Yes     Comment: rarely    Drug use: No    Sexual activity: Yes     Partners: Male     Birth control/protection: Condom, OCP         Medications/Allergies: See med card    Vitals:    03/09/22 1501   BP: 120/73   Pulse: 102   Weight: 101.8 kg (224 lb 5.1 oz)   Height: 5' 3" (1.6 m)   PainSc:   8   PainLoc: Shoulder         Physical exam:  Gen: A and O x3, pleasant, well-groomed  Skin: No rashes or obvious lesions  HEENT: PERRLA, no obvious deformities on ears or in canals.Trachea midline.  CVS: Regular rate and rhythm, normal palpable pulses.  Resp: Clear to auscultation bilaterally, no wheezes or rales.  Abdomen: Soft, NT/ND.  Musculoskeletal: Able to heel walk, toe walk. No antalgic gait.     Neuro:  Upper extremities: 5/5 strength bilaterally   Lower extremities:  5/5 strength bilaterally  Reflexes: Brachioradialis 3+ left, 2+ right, Bicep 3+ left, 2+ right, Tricep 3+ left, 2+ right. Patellar 3+ bilaterally, Achilles 2+ bilaterally  Sensory: Intact and symmetrical to light touch and pinprick in C2-T1 dermatomes bilaterally.    Cervical Spine:  Cervical spine: ROM is full in flexion, extension and lateral rotation with mild increased left neck pain during left lateral rotation.  Myofascial exam: Tenderness to palpation across the left cervical paraspinous muscles and left scapular region.    Lumbar spine:  Lumbar spine: ROM is full with flexion extension " and oblique extension with mild increased pain in the back with extension  Regis's test causes increased pain in the groin on either side  Supine straight leg raise is negative bilaterally.    Internal and external rotation of the hip causes no increased pain on either side.  Myofascial exam: No tenderness to palpation across lumbar paraspinous muscles.      Imagin20 MRI C-spine:  Discs: Mild degenerative disc desiccation at C2-3 through C6-7.  No significant disc space narrowing.   Cord: Flattening of the right ventral aspect of the cervical cord at the C6-7 level.  No definite focal cord signal abnormality.   Skull base and craniocervical junction: Normal.   Degenerative findings:   C2-C3: The disc is normal in configuration. Mild bilateral facet arthropathy.  There is no neural foraminal stenosis.  There is no spinal canal stenosis.   C3-C4: The disc is normal in configuration.  Mild bilateral, left greater than right, facet arthropathy.  There is no neural foraminal stenosis.  There is no spinal canal stenosis.   C4-C5: Minimal posterior disc osteophyte complex.  Mild bilateral, left greater than right, facet arthropathy.  Mild bilateral uncovertebral joint spurring.  There is no neural foraminal stenosis.  There is no spinal canal stenosis.   C5-C6: The disc is normal in configuration.  There is no facet arthropathy.  Mild left uncovertebral joint spurring.  Mild left neural foraminal stenosis.  There is no spinal canal stenosis.   C6-C7: Prominent right disc extrusion with partial extension into the right foramen, which flattens the right ventral cord surface and contributes to mild spinal canal stenosis and moderate right neural foraminal narrowing.  Mild bilateral facet arthropathy.  Mild right uncovertebral joint spurring.   C7-T1: The disc is normal in configuration.  There is no facet arthropathy.  There is no uncovertebral joint disease.  There is no neural foraminal stenosis.  There is no  spinal canal stenosis.     11/10/2021 MRI cervical spine  Limited intracranial evaluation demonstrates no large mass effect or fluid collection.  No gross soft tissue defect or significant susceptibility artifact is appreciated. Osseous alignment appears maintained with no diffuse acute abnormal marrow or central cord signal appreciated. There is some straightening of the curvature overall.  This may be positional versus muscular spasm.  There is disc space narrowing with decreased disc water signal similar overall at the C6-7 level. There is multilevel facet, uncovertebral hypertrophy.     C2-3 demonstrates no significant interval spinal canal or neural foraminal narrowing.     C3-4 demonstrates no significant interval spinal canal or neural foraminal narrowing.     C4-5 demonstrates no significant interval spinal canal or neural foraminal narrowing with some marginal dorsal disc bulging.     C5-6 demonstrates some marginal dorsal disc bulging with minimal lateral recess, inferior neural foraminal narrowing bilaterally.     C6-7 demonstrates some dorsal disc bulging with large protrusion appearance at the right lateral recess, neural foraminal origin indenting the adjacent thecal sac, cord.  There is some faint increased cord signal on the sagittal sequence suggestive early myelomalacia.  This contributes overall to mild left as well as moderate right neural foraminal origin narrowing.     C7-T1 demonstrates no significant interval spinal canal or neural foraminal narrowing.    11/29/2021 MRI lumbar spine  Five lumbar type vertebral bodies.  Straightening the normal lordotic curvature.  The alignment is normal.  The vertebral body heights are maintained without evidence for compression deformity, fracture, or subluxation.  Marrow signal pattern is normal.  Intervertebral disc space narrowing is identified at L4-5.  Disc desiccation is identified at L4-5 and L5-S1.     The conus medullaris is at the level of L1.   The proximal spinal cord is of normal caliber the distal nerve roots are normal.     T12-L1: Ligament flavum hypertrophy with degenerative change of facets.  No evidence for central canal stenosis or neural foraminal narrowing.     L1-L2: No significant abnormality.     L2-L3: Ligament flavum hypertrophy of subtle broad-based disc bulge without central canal stenosis or neural foraminal narrowing.     L3-L4: Subtle broad-based disc bulge with ligament flavum hypertrophy.  Indentation of the thecal sac without central canal stenosis or neural foraminal narrowing.     L4-L5: Broad-based disc bulge central disc component with indentation of the thecal sac.  No evidence for central canal stenosis.  Moderate bilateral neural foraminal narrowing with possible abutment of the exiting L4 nerve roots.     L5-S1: Broad-based disc bulge with central disc protrusion and increased T2 signal suggestive of annular fissure.  Indentation of the thecal sac without evidence for central canal stenosis.  Moderate to severe bilateral neural foraminal narrowing with abutment of the exiting L5 nerve roots.     The bilateral SI joints are normal.     The visualized hollow and solid viscera within normal limits.    Assessment:   The patient is a 26-year-old woman with a history of narcolepsy, TEA, fibromyalgia, presents in self-referral for neck pain.   1. Lumbar radiculopathy  Case Request Operating Room: Injection-steroid-epidural-lumbar L5/S1   2. DDD (degenerative disc disease), lumbar     3. DDD (degenerative disc disease), cervical     4. Muscle spasm     5. Pre-op testing  COVID-19 Routine Screening       Plan:  1. She continues to improve following her C6-7 ACDF and we discussed the importance of continued physical therapy.  2. In regards to her back and leg pain we discussed that her pain is likely due to L4/5 and L5/S1 where she has disc bulges and foraminal stenosis.  I am going to schedule her for an L5/S1 interlaminar epidural  steroid injection.  3. She has been taking Robaxin without significant relief so I will have her discontinue this and I provided a prescription for tizanidine.               no problems identified which require skilled intervention not appropriate for swallowing intervention

## 2022-03-21 ENCOUNTER — LAB VISIT (OUTPATIENT)
Dept: FAMILY MEDICINE | Facility: CLINIC | Age: 27
End: 2022-03-21
Payer: COMMERCIAL

## 2022-03-21 DIAGNOSIS — Z01.818 PRE-OP TESTING: ICD-10-CM

## 2022-03-21 PROCEDURE — U0005 INFEC AGEN DETEC AMPLI PROBE: HCPCS | Performed by: PHYSICIAN ASSISTANT

## 2022-03-21 PROCEDURE — U0003 INFECTIOUS AGENT DETECTION BY NUCLEIC ACID (DNA OR RNA); SEVERE ACUTE RESPIRATORY SYNDROME CORONAVIRUS 2 (SARS-COV-2) (CORONAVIRUS DISEASE [COVID-19]), AMPLIFIED PROBE TECHNIQUE, MAKING USE OF HIGH THROUGHPUT TECHNOLOGIES AS DESCRIBED BY CMS-2020-01-R: HCPCS | Performed by: PHYSICIAN ASSISTANT

## 2022-03-22 LAB
SARS-COV-2 RNA RESP QL NAA+PROBE: NOT DETECTED
SARS-COV-2- CYCLE NUMBER: NORMAL

## 2022-03-24 ENCOUNTER — HOSPITAL ENCOUNTER (OUTPATIENT)
Dept: RADIOLOGY | Facility: HOSPITAL | Age: 27
Discharge: HOME | End: 2022-03-24
Attending: ANESTHESIOLOGY
Payer: COMMERCIAL

## 2022-03-24 ENCOUNTER — HOSPITAL ENCOUNTER (OUTPATIENT)
Facility: HOSPITAL | Age: 27
Discharge: HOME | End: 2022-03-24
Attending: ANESTHESIOLOGY | Admitting: ANESTHESIOLOGY
Payer: COMMERCIAL

## 2022-03-24 VITALS
DIASTOLIC BLOOD PRESSURE: 74 MMHG | HEART RATE: 96 BPM | RESPIRATION RATE: 18 BRPM | OXYGEN SATURATION: 98 % | TEMPERATURE: 98 F | SYSTOLIC BLOOD PRESSURE: 150 MMHG

## 2022-03-24 DIAGNOSIS — M54.16 LUMBAR RADICULOPATHY: ICD-10-CM

## 2022-03-24 DIAGNOSIS — M54.50 LOWER BACK PAIN: ICD-10-CM

## 2022-03-24 LAB
B-HCG UR QL: NEGATIVE
CTP QC/QA: YES

## 2022-03-24 PROCEDURE — 76000 FLUOROSCOPY <1 HR PHYS/QHP: CPT | Mod: TC,PO

## 2022-03-24 PROCEDURE — 62323 NJX INTERLAMINAR LMBR/SAC: CPT | Mod: PO | Performed by: ANESTHESIOLOGY

## 2022-03-24 PROCEDURE — A4216 STERILE WATER/SALINE, 10 ML: HCPCS | Mod: PO | Performed by: ANESTHESIOLOGY

## 2022-03-24 PROCEDURE — 25000003 PHARM REV CODE 250: Mod: PO | Performed by: ANESTHESIOLOGY

## 2022-03-24 PROCEDURE — 62323 NJX INTERLAMINAR LMBR/SAC: CPT | Mod: ,,, | Performed by: ANESTHESIOLOGY

## 2022-03-24 PROCEDURE — 62323 PR INJ LUMBAR/SACRAL, W/IMAGING GUIDANCE: ICD-10-PCS | Mod: ,,, | Performed by: ANESTHESIOLOGY

## 2022-03-24 PROCEDURE — 81025 URINE PREGNANCY TEST: CPT | Mod: PO | Performed by: ANESTHESIOLOGY

## 2022-03-24 PROCEDURE — 25500020 PHARM REV CODE 255: Mod: PO | Performed by: ANESTHESIOLOGY

## 2022-03-24 PROCEDURE — 63600175 PHARM REV CODE 636 W HCPCS: Mod: PO | Performed by: ANESTHESIOLOGY

## 2022-03-24 RX ORDER — METHYLPREDNISOLONE ACETATE 80 MG/ML
INJECTION, SUSPENSION INTRA-ARTICULAR; INTRALESIONAL; INTRAMUSCULAR; SOFT TISSUE
Status: DISCONTINUED | OUTPATIENT
Start: 2022-03-24 | End: 2022-03-24 | Stop reason: HOSPADM

## 2022-03-24 RX ORDER — ALPRAZOLAM 0.5 MG/1
1 TABLET, ORALLY DISINTEGRATING ORAL ONCE AS NEEDED
Status: COMPLETED | OUTPATIENT
Start: 2022-03-24 | End: 2022-03-24

## 2022-03-24 RX ORDER — SODIUM CHLORIDE 9 MG/ML
INJECTION, SOLUTION INTRAMUSCULAR; INTRAVENOUS; SUBCUTANEOUS
Status: DISCONTINUED | OUTPATIENT
Start: 2022-03-24 | End: 2022-03-24 | Stop reason: HOSPADM

## 2022-03-24 RX ORDER — LIDOCAINE HYDROCHLORIDE 10 MG/ML
INJECTION, SOLUTION EPIDURAL; INFILTRATION; INTRACAUDAL; PERINEURAL
Status: DISCONTINUED | OUTPATIENT
Start: 2022-03-24 | End: 2022-03-24 | Stop reason: HOSPADM

## 2022-03-24 RX ADMIN — ALPRAZOLAM 1 MG: 0.5 TABLET, ORALLY DISINTEGRATING ORAL at 03:03

## 2022-03-24 NOTE — DISCHARGE INSTRUCTIONS
PAIN MANAGEMENT    Home care instructions   Apply ice pack to the injection site for 20 minute prior for the first 24 hours for soreness/discomfort at injection site   DO NOT USE HEAT FOR 24 HOURS   Keep site clean and dry for 24 hours, remove bandaid when desired   Do not drive until tomorrow  Take care when walking after a lumbar injection     STEROIDS OR RADIOFREQUENCY    May take 10-14 days for full effects  Avoid strenuous exercises for 2 days            Resume Aspirin, Plavix, or Coumadin the day after the procedure unless other wise instructed  Resume home medication as prescribed today      CALL PHYSICIAN FOR:  Severe increase in your usual pain or appearance of new pain  Prolonged or increasing weakness or numbness in the legs or arms  Fever greater then 100 degrees F..  Drainage from the incision site, redness, active bleeding or increased swelling at the injection site  Headache that increases when your head is upright and decreases when you lie flat    FOR EMERGENCIES:   Go directly to Emergency Department for Shortness of breath, chest pain, or problems breathing

## 2022-03-24 NOTE — OP NOTE

## 2022-03-24 NOTE — DISCHARGE SUMMARY
[facility] - Surgery  Discharge Note  Short Stay    Procedure(s) (LRB):  Injection-steroid-epidural-lumbar L5/S1 (N/A)    OUTCOME: Patient tolerated treatment/procedure well without complication and is now ready for discharge.    DISPOSITION: Home or Self Care    FINAL DIAGNOSIS:  Lumbar radiculopathy    FOLLOWUP: In clinic    DISCHARGE INSTRUCTIONS:    Discharge Procedure Orders   Diet Adult Regular     No dressing needed     Notify your health care provider if you experience any of the following:  temperature >100.4     Activity as tolerated

## 2022-04-13 ENCOUNTER — OFFICE VISIT (OUTPATIENT)
Dept: ORTHOPEDICS | Facility: CLINIC | Age: 27
End: 2022-04-13
Payer: COMMERCIAL

## 2022-04-13 VITALS — HEIGHT: 63 IN | BODY MASS INDEX: 39.69 KG/M2 | WEIGHT: 224 LBS | RESPIRATION RATE: 18 BRPM

## 2022-04-13 DIAGNOSIS — M89.8X1 PAIN OF LEFT SCAPULA: ICD-10-CM

## 2022-04-13 DIAGNOSIS — M25.519 SHOULDER PAIN, UNSPECIFIED CHRONICITY, UNSPECIFIED LATERALITY: Primary | ICD-10-CM

## 2022-04-13 PROCEDURE — 1160F PR REVIEW ALL MEDS BY PRESCRIBER/CLIN PHARMACIST DOCUMENTED: ICD-10-PCS | Mod: CPTII,S$GLB,, | Performed by: ORTHOPAEDIC SURGERY

## 2022-04-13 PROCEDURE — 1160F RVW MEDS BY RX/DR IN RCRD: CPT | Mod: CPTII,S$GLB,, | Performed by: ORTHOPAEDIC SURGERY

## 2022-04-13 PROCEDURE — 3008F PR BODY MASS INDEX (BMI) DOCUMENTED: ICD-10-PCS | Mod: CPTII,S$GLB,, | Performed by: ORTHOPAEDIC SURGERY

## 2022-04-13 PROCEDURE — 99999 PR PBB SHADOW E&M-EST. PATIENT-LVL III: ICD-10-PCS | Mod: PBBFAC,,, | Performed by: ORTHOPAEDIC SURGERY

## 2022-04-13 PROCEDURE — 1159F PR MEDICATION LIST DOCUMENTED IN MEDICAL RECORD: ICD-10-PCS | Mod: CPTII,S$GLB,, | Performed by: ORTHOPAEDIC SURGERY

## 2022-04-13 PROCEDURE — 99999 PR PBB SHADOW E&M-EST. PATIENT-LVL III: CPT | Mod: PBBFAC,,, | Performed by: ORTHOPAEDIC SURGERY

## 2022-04-13 PROCEDURE — 99213 OFFICE O/P EST LOW 20 MIN: CPT | Mod: S$GLB,,, | Performed by: ORTHOPAEDIC SURGERY

## 2022-04-13 PROCEDURE — 3008F BODY MASS INDEX DOCD: CPT | Mod: CPTII,S$GLB,, | Performed by: ORTHOPAEDIC SURGERY

## 2022-04-13 PROCEDURE — 1159F MED LIST DOCD IN RCRD: CPT | Mod: CPTII,S$GLB,, | Performed by: ORTHOPAEDIC SURGERY

## 2022-04-13 PROCEDURE — 99213 PR OFFICE/OUTPT VISIT, EST, LEVL III, 20-29 MIN: ICD-10-PCS | Mod: S$GLB,,, | Performed by: ORTHOPAEDIC SURGERY

## 2022-04-13 NOTE — PROGRESS NOTES
Past Medical History:   Diagnosis Date    Anxiety     Asthma     Cervical radiculopathy 12/2021    Depression     Fibromyalgia     GERD (gastroesophageal reflux disease)     Headache(784.0)     Migraine     Narcolepsy     Obesity, Class II, BMI 35-39.9, no comorbidity 5/7/2016    Optic nerve edema     Sleep apnea     no cpap    Urinary tract infection     Vision abnormalities     glasses       Past Surgical History:   Procedure Laterality Date    ANTERIOR CERVICAL DISCECTOMY W/ FUSION N/A 12/13/2021    Procedure: DISCECTOMY, SPINE, CERVICAL, ANTERIOR APPROACH, WITH FUSION, C6-7;  Surgeon: Deven Ball MD;  Location: [facility] OR;  Service: Neurosurgery;  Laterality: N/A;    EPIDURAL STEROID INJECTION INTO CERVICAL SPINE N/A 10/11/2021    Procedure: Injection-steroid-epidural-cervical;  Surgeon: Charlie Jacques MD;  Location: [facility] OR;  Service: Pain Management;  Laterality: N/A;    EPIDURAL STEROID INJECTION INTO LUMBAR SPINE N/A 3/24/2022    Procedure: Injection-steroid-epidural-lumbar L5/S1;  Surgeon: Charlie Jacques MD;  Location: [facility] OR;  Service: Pain Management;  Laterality: N/A;    WISDOM TOOTH EXTRACTION         Current Outpatient Medications   Medication Sig    dextroamphetamine-amphetamine (ADDERALL XR) 30 MG 24 hr capsule Take by mouth every morning.    DULoxetine 40 mg CpDR Take 1 capsule by mouth every evening.    ESTARYLLA 0.25-35 mg-mcg per tablet TAKE 1 TABLET BY MOUTH EVERY DAY    fluticasone (FLONASE) 50 mcg/actuation nasal spray 1 spray by Each Nare route once daily. (Patient taking differently: 1 spray by Each Nostril route daily as needed.)    gabapentin (NEURONTIN) 300 MG capsule 300mg in AM, 300mg noon  and 600mg at HS    pantoprazole (PROTONIX) 40 MG tablet Take 1 tablet (40 mg total) by mouth Daily. (Patient taking differently: Take 40 mg by mouth every evening.)    rimegepant (NURTEC) 75 mg odt Take 1 tablet (75 mg total) by mouth daily as needed. Place ODT  tablet on the tongue; alternatively the ODT tablet may be placed under the tongue    UNABLE TO FIND Physical Therapy - evaluate and treat this patient with EDS  Dr. Mildred Philippe [phone]     Current Facility-Administered Medications   Medication    onabotulinumtoxina injection 200 Units       Review of patient's allergies indicates:   Allergen Reactions    No known drug allergies        Family History   Problem Relation Age of Onset    Mental illness Other     Arthritis Mother     Asthma Sister         half sister    Miscarriages / Stillbirths Sister         half sister    Vision loss Maternal Aunt     Cancer Maternal Aunt     Arthritis Maternal Grandmother     Cataracts Maternal Grandmother     Cancer Maternal Grandfather         colon, liver, lungs    Diabetes Paternal Grandmother     Heart disease Paternal Grandmother     Hypertension Paternal Grandmother     COPD Paternal Grandfather     Ulcerative colitis Father     Other Father         recurrent staff infections    Amblyopia Neg Hx     Blindness Neg Hx     Glaucoma Neg Hx     Macular degeneration Neg Hx     Retinal detachment Neg Hx     Strabismus Neg Hx     Stroke Neg Hx     Thyroid disease Neg Hx        Social History     Socioeconomic History    Marital status: Single   Tobacco Use    Smoking status: Never Smoker    Smokeless tobacco: Never Used   Substance and Sexual Activity    Alcohol use: Yes     Comment: rarely    Drug use: No    Sexual activity: Yes     Partners: Male     Birth control/protection: Condom, OCP       Chief Complaint:   Chief Complaint   Patient presents with    Left Shoulder - Pain       History of present illness:  Is a 26-year-old right-hand-dominant female seen in consultation for Dr. Ball for left shoulder pain.  Patient has some pain around the posterior aspect of her shoulder blade with possibly some mild winging and dyskinesia.  Patient had a history of severe of disc protrusion at C6-7  causing some myelopathy and bilateral foraminal stenosis.  Had an ACDF on December 13, 2021.   She has had pain in that arm for about 6 months so it existed prior to the surgery.  It is a constant pain that is described as a dull ache and feels like that arm is a little weaker.  We got her into some physical therapy at rehab [facility] and seems to be helping.      Review of Systems:    Musculoskeletal:  See HPI    Neurological:+ for numbness.        Physical Examination:    Vital Signs:    Vitals:    04/13/22 1504   Resp: 18       Body mass index is 39.68 kg/m².    This a well-developed, well nourished patient in no acute distress.  They are alert and oriented and cooperative to examination.  Pt. walks without an antalgic gait.      Examination of the left shoulder shows no rashes or erythema. There are no masses, ecchymosis, or atrophy. The patient has full range of motion in forward flexion, external rotation, and internal rotation to the mid T-spine. The patient has - Vaughan test. - Neer - Springfield's test. - Speeds test. Nontender to palpation over a.c. joint.  Possibly some micro instability anteriorly, posteriorly, and negative sulcus sign. Passive range of motion: Forward flexion of 180°, external rotation at 90° of 90°, internal rotation of 50°, and external rotation at 0° of 50°. 2+ radial pulse. Intact axillary, radial, median and ulnar sensation. 5 out of 5 resisted forward flexion, external rotation, and negative lift off test.  Mild medial scapular pain with some protraction and mild lateral winging.  Protraction and wean her much improved compared to her last visit.      X-rays:  X-ray of the left shoulder is  reviewed which show prior ACDF.  No problem with the shoulder noted.     Assessment::  Left scapular winging and dyskinesia    Plan:  I reviewed the findings with her today.  Continue with physical therapy.  Follow-up in 3 months.  Told her it could take a full 12 the 18 months to get this fully  resolved.    This note was created using [employer] voice recognition software that occasionally misinterpreted phrases or words.    Consult note is delivered via Epic messaging service.

## 2022-04-26 ENCOUNTER — PATIENT OUTREACH (OUTPATIENT)
Dept: ADMINISTRATIVE | Facility: OTHER | Age: 27
End: 2022-04-26
Payer: COMMERCIAL

## 2022-04-26 NOTE — PROGRESS NOTES
Health Maintenance Due   Topic Date Due    Influenza Vaccine (1) 09/01/2021    COVID-19 Vaccine (3 - Booster for Pfizer series) 12/15/2021     Updates were requested from care everywhere.  Chart was reviewed for overdue Proactive [facility] Encounters ([facility]) topics (CRS, Breast Cancer Screening, Eye exam)  Health Maintenance has been updated.  [place] immunization registry triggered.  Immunizations were reconciled.

## 2022-04-28 ENCOUNTER — OFFICE VISIT (OUTPATIENT)
Dept: PAIN MEDICINE | Facility: CLINIC | Age: 27
End: 2022-04-28
Payer: COMMERCIAL

## 2022-04-28 VITALS
SYSTOLIC BLOOD PRESSURE: 128 MMHG | HEART RATE: 91 BPM | HEIGHT: 64 IN | BODY MASS INDEX: 38.69 KG/M2 | DIASTOLIC BLOOD PRESSURE: 80 MMHG | WEIGHT: 226.63 LBS

## 2022-04-28 DIAGNOSIS — M51.36 DDD (DEGENERATIVE DISC DISEASE), LUMBAR: ICD-10-CM

## 2022-04-28 DIAGNOSIS — M54.16 LUMBAR RADICULOPATHY: Primary | ICD-10-CM

## 2022-04-28 DIAGNOSIS — M54.12 CERVICAL RADICULOPATHY: ICD-10-CM

## 2022-04-28 PROCEDURE — 3074F PR MOST RECENT SYSTOLIC BLOOD PRESSURE < 130 MM HG: ICD-10-PCS | Mod: CPTII,S$GLB,, | Performed by: ANESTHESIOLOGY

## 2022-04-28 PROCEDURE — 99214 OFFICE O/P EST MOD 30 MIN: CPT | Mod: S$GLB,,, | Performed by: ANESTHESIOLOGY

## 2022-04-28 PROCEDURE — 99999 PR PBB SHADOW E&M-EST. PATIENT-LVL IV: CPT | Mod: PBBFAC,,, | Performed by: ANESTHESIOLOGY

## 2022-04-28 PROCEDURE — 3079F PR MOST RECENT DIASTOLIC BLOOD PRESSURE 80-89 MM HG: ICD-10-PCS | Mod: CPTII,S$GLB,, | Performed by: ANESTHESIOLOGY

## 2022-04-28 PROCEDURE — 1159F MED LIST DOCD IN RCRD: CPT | Mod: CPTII,S$GLB,, | Performed by: ANESTHESIOLOGY

## 2022-04-28 PROCEDURE — 3008F BODY MASS INDEX DOCD: CPT | Mod: CPTII,S$GLB,, | Performed by: ANESTHESIOLOGY

## 2022-04-28 PROCEDURE — 99999 PR PBB SHADOW E&M-EST. PATIENT-LVL IV: ICD-10-PCS | Mod: PBBFAC,,, | Performed by: ANESTHESIOLOGY

## 2022-04-28 PROCEDURE — 3008F PR BODY MASS INDEX (BMI) DOCUMENTED: ICD-10-PCS | Mod: CPTII,S$GLB,, | Performed by: ANESTHESIOLOGY

## 2022-04-28 PROCEDURE — 99214 PR OFFICE/OUTPT VISIT, EST, LEVL IV, 30-39 MIN: ICD-10-PCS | Mod: S$GLB,,, | Performed by: ANESTHESIOLOGY

## 2022-04-28 PROCEDURE — 3079F DIAST BP 80-89 MM HG: CPT | Mod: CPTII,S$GLB,, | Performed by: ANESTHESIOLOGY

## 2022-04-28 PROCEDURE — 3074F SYST BP LT 130 MM HG: CPT | Mod: CPTII,S$GLB,, | Performed by: ANESTHESIOLOGY

## 2022-04-28 PROCEDURE — 1159F PR MEDICATION LIST DOCUMENTED IN MEDICAL RECORD: ICD-10-PCS | Mod: CPTII,S$GLB,, | Performed by: ANESTHESIOLOGY

## 2022-04-28 RX ORDER — ALPRAZOLAM 0.5 MG/1
1 TABLET, ORALLY DISINTEGRATING ORAL ONCE AS NEEDED
Status: CANCELLED | OUTPATIENT
Start: 2022-04-28 | End: 2033-09-24

## 2022-04-28 NOTE — H&P (VIEW-ONLY)
This note was completed with dictation software and grammatical errors may exist.    CC:  Neck pain, low back pain    HPI:  The patient is a 26-year-old woman with a history of narcolepsy, TEA, fibromyalgia, presents in self-referral for neck pain.  She returns in follow-up today, status post L5/S1 JOSUE on 03/24/2022 with 100% relief of her radicular leg pain at times, 50% relief of her back pain overall.  She states that she still does get some leg pain with walking too much, back pain with sitting too long.  However she is able to sit much longer and walk much longer after the injection.  She is pleased with the results.  She has been doing physical therapy for her neck, she is status post fusion and feels that her overall balance and strength are definitely returning.  She feels that she can walk better now after her neck surgery as well, reports some occasional pain into the arms but this is greatly improved and she is pleased that she went through with the surgery.      Interval history 03/09/2022:  She returns in follow-up today with neck pain and low back pain.  Since her last visit she underwent C6-7 ACDF with Dr. Ball on 12/13/2021.  She has had relief of her left upper extremity symptoms but does continue to still have some left-sided neck pain radiating behind her left ear, along her left collar bone and in her left scapular region.  She reports some weaning in her left scapula and has been working to strengthen this.  Her main complaint today is low back and leg pain.  She reports soreness in the sacrum and tailbone region with sitting as well as cramping across the low back and upper buttocks.  She reports pain occasionally radiating to the groin but more consistently radiating to the lateral hips and lateral thighs.  She reports numbness and tingling in her legs a few times a week when her pain is severe.  She does still have some weakness in her left upper extremity but this has significantly improved  following her surgery.  She also reports improved numbness in her left upper extremity.  She denies bladder or bowel incontinence.    Previous history:  Patient reports having many years of neck pain, upper back pain, low back pain.  She has numbness and tingling in her left arm.  She has been diagnosed with fibromyalgia up but they had assessed her for Denzel-Danlos since she has family members who have this as well but apparently she did not meet all the diagnostic criteria.  Nonetheless she continues to have neck pain, worse to the right side in her right trapezius but numbness and tingling down the left arm into the hand.  She describes the pain is aching, shooting, tight, tingling.  Is worse with sitting, standing, bending, coughing or sneezing.  She gets relief with rest.  She has been taking gabapentin 600 twice daily with slight relief, also has had some slight benefit with ibuprofen and Flexeril.  She denies any roma weakness but does report having issues with dropping things several times a week.  She denies any balance issues but did note that in her review of systems.    Pain intervention history:  She returns in follow-up today, she is status post cervical JOSUE on 10/11/2021 with 25% relief of the neck pain. She returns in follow-up today, status post L5/S1 JOSUE on 03/24/2022 with 100% relief of her radicular leg pain at times, 50% relief of her back pain overall.     Spine surgeries:  C6-7 ACDF with Dr. Ball on 12/13/2021.     Antineuropathics:  Gabapentin 600 twice daily with moderate benefit  NSAIDs:  Taking ibuprofen with slight relief.  Physical therapy:  Currently in physical therapy for her neck in spring of 2022 doing well   Antidepressants:  Duloxetine 40  Muscle relaxers:  Flexeril slight benefit  Opioids:  Antiplatelets/Anticoagulants:    ROS:  She reports fatigue, headaches, shortness of breath, stomach pain, urinary urgency and frequency, back pain, memory loss, dizziness, anxiety and loss  "of balance.  Balance of review of systems is negative.    Lab Results   Component Value Date    HGBA1C 5.5 12/09/2021       Lab Results   Component Value Date    WBC 6.37 12/09/2021    HGB 12.9 12/09/2021    HCT 39.6 12/09/2021    MCV 88 12/09/2021     12/09/2021             Past Medical History:   Diagnosis Date    Anxiety     Asthma     Cervical radiculopathy 12/2021    Depression     Fibromyalgia     GERD (gastroesophageal reflux disease)     Headache(784.0)     Migraine     Narcolepsy     Obesity, Class II, BMI 35-39.9, no comorbidity 5/7/2016    Optic nerve edema     Sleep apnea     no cpap    Urinary tract infection     Vision abnormalities     glasses       Past Surgical History:   Procedure Laterality Date    ANTERIOR CERVICAL DISCECTOMY W/ FUSION N/A 12/13/2021    Procedure: DISCECTOMY, SPINE, CERVICAL, ANTERIOR APPROACH, WITH FUSION, C6-7;  Surgeon: Deven Ball MD;  Location: [facility] OR;  Service: Neurosurgery;  Laterality: N/A;    EPIDURAL STEROID INJECTION INTO CERVICAL SPINE N/A 10/11/2021    Procedure: Injection-steroid-epidural-cervical;  Surgeon: Charlie Jacques MD;  Location: [facility] OR;  Service: Pain Management;  Laterality: N/A;    EPIDURAL STEROID INJECTION INTO LUMBAR SPINE N/A 3/24/2022    Procedure: Injection-steroid-epidural-lumbar L5/S1;  Surgeon: Charlie Jacques MD;  Location: [facility] OR;  Service: Pain Management;  Laterality: N/A;    WISDOM TOOTH EXTRACTION         Social History     Socioeconomic History    Marital status: Single   Tobacco Use    Smoking status: Never Smoker    Smokeless tobacco: Never Used   Substance and Sexual Activity    Alcohol use: Yes     Comment: rarely    Drug use: No    Sexual activity: Yes     Partners: Male     Birth control/protection: Condom, OCP         Medications/Allergies: See med card    Vitals:    04/28/22 1013   BP: 128/80   Pulse: 91   Weight: 102.8 kg (226 lb 10.1 oz)   Height: 5' 3.6" (1.615 m)   PainSc:   6 "   PainLoc: Back         Physical exam:  Gen: A and O x3, pleasant, well-groomed  Skin: No rashes or obvious lesions  HEENT: PERRLA, no obvious deformities on ears or in canals.Trachea midline.  CVS: Regular rate and rhythm, normal palpable pulses.  Resp: Clear to auscultation bilaterally, no wheezes or rales.  Abdomen: Soft, NT/ND.  Musculoskeletal: Able to heel walk, toe walk. No antalgic gait.     Neuro:  Upper extremities: 5/5 strength bilaterally   Lower extremities:  5/5 strength bilaterally  Reflexes: Brachioradialis 3+ left, 2+ right, Bicep 3+ left, 2+ right, Tricep 3+ left, 2+ right. Patellar 3+ bilaterally, Achilles 2+ bilaterally  Sensory: Intact and symmetrical to light touch and pinprick in C2-T1 dermatomes bilaterally.    Lumbar spine:  Lumbar spine: ROM is full with flexion extension and oblique extension with mild increased pain in the back with extension  Regis's test causes increased pain in the groin on either side  Supine straight leg raise is negative bilaterally.    Internal and external rotation of the hip causes no increased pain on either side.  Myofascial exam: No tenderness to palpation across lumbar paraspinous muscles.      Imagin20 MRI C-spine:  Discs: Mild degenerative disc desiccation at C2-3 through C6-7.  No significant disc space narrowing.   Cord: Flattening of the right ventral aspect of the cervical cord at the C6-7 level.  No definite focal cord signal abnormality.   Skull base and craniocervical junction: Normal.   Degenerative findings:   C2-C3: The disc is normal in configuration. Mild bilateral facet arthropathy.  There is no neural foraminal stenosis.  There is no spinal canal stenosis.   C3-C4: The disc is normal in configuration.  Mild bilateral, left greater than right, facet arthropathy.  There is no neural foraminal stenosis.  There is no spinal canal stenosis.   C4-C5: Minimal posterior disc osteophyte complex.  Mild bilateral, left greater than right,  facet arthropathy.  Mild bilateral uncovertebral joint spurring.  There is no neural foraminal stenosis.  There is no spinal canal stenosis.   C5-C6: The disc is normal in configuration.  There is no facet arthropathy.  Mild left uncovertebral joint spurring.  Mild left neural foraminal stenosis.  There is no spinal canal stenosis.   C6-C7: Prominent right disc extrusion with partial extension into the right foramen, which flattens the right ventral cord surface and contributes to mild spinal canal stenosis and moderate right neural foraminal narrowing.  Mild bilateral facet arthropathy.  Mild right uncovertebral joint spurring.   C7-T1: The disc is normal in configuration.  There is no facet arthropathy.  There is no uncovertebral joint disease.  There is no neural foraminal stenosis.  There is no spinal canal stenosis.     11/10/2021 MRI cervical spine  Limited intracranial evaluation demonstrates no large mass effect or fluid collection.  No gross soft tissue defect or significant susceptibility artifact is appreciated. Osseous alignment appears maintained with no diffuse acute abnormal marrow or central cord signal appreciated. There is some straightening of the curvature overall.  This may be positional versus muscular spasm.  There is disc space narrowing with decreased disc water signal similar overall at the C6-7 level. There is multilevel facet, uncovertebral hypertrophy.   C2-3 demonstrates no significant interval spinal canal or neural foraminal narrowing.   C3-4 demonstrates no significant interval spinal canal or neural foraminal narrowing.   C4-5 demonstrates no significant interval spinal canal or neural foraminal narrowing with some marginal dorsal disc bulging.   C5-6 demonstrates some marginal dorsal disc bulging with minimal lateral recess, inferior neural foraminal narrowing bilaterally.   C6-7 demonstrates some dorsal disc bulging with large protrusion appearance at the right lateral recess,  neural foraminal origin indenting the adjacent thecal sac, cord.  There is some faint increased cord signal on the sagittal sequence suggestive early myelomalacia.  This contributes overall to mild left as well as moderate right neural foraminal origin narrowing.   C7-T1 demonstrates no significant interval spinal canal or neural foraminal narrowing.    11/29/2021 MRI lumbar spine  Five lumbar type vertebral bodies.  Straightening the normal lordotic curvature.  The alignment is normal.  The vertebral body heights are maintained without evidence for compression deformity, fracture, or subluxation.  Marrow signal pattern is normal.  Intervertebral disc space narrowing is identified at L4-5.  Disc desiccation is identified at L4-5 and L5-S1.   The conus medullaris is at the level of L1.  The proximal spinal cord is of normal caliber the distal nerve roots are normal.   T12-L1: Ligament flavum hypertrophy with degenerative change of facets.  No evidence for central canal stenosis or neural foraminal narrowing.   L1-L2: No significant abnormality.   L2-L3: Ligament flavum hypertrophy of subtle broad-based disc bulge without central canal stenosis or neural foraminal narrowing.   L3-L4: Subtle broad-based disc bulge with ligament flavum hypertrophy.  Indentation of the thecal sac without central canal stenosis or neural foraminal narrowing.   L4-L5: Broad-based disc bulge central disc component with indentation of the thecal sac.  No evidence for central canal stenosis.  Moderate bilateral neural foraminal narrowing with possible abutment of the exiting L4 nerve roots.   L5-S1: Broad-based disc bulge with central disc protrusion and increased T2 signal suggestive of annular fissure.  Indentation of the thecal sac without evidence for central canal stenosis.  Moderate to severe bilateral neural foraminal narrowing with abutment of the exiting L5 nerve roots.   The bilateral SI joints are normal.   The visualized hollow  and solid viscera within normal limits.    Assessment:   The patient is a 26-year-old woman with a history of narcolepsy, TEA, fibromyalgia, presents in self-referral for neck pain.   1. Lumbar radiculopathy  Vital signs    Verify informed consent    Notify physician     Notify physician     Notify physician (specify)    Diet NPO    Case Request Operating Room: Injection-steroid-epidural-lumbar L5/S1 L>R    Place in Outpatient    alprazolam ODT dissolvable tablet 1 mg   2. DDD (degenerative disc disease), lumbar     3. Cervical radiculopathy         Plan:  1. She continues to improve in terms of her neck, continuing physical therapy, we also discussed core strengthening exercises and the need to do this long-term for the best long-term benefit.  2. Since she did have relief with her back and leg with the epidural steroid injection but it is not 100%, we discussed repeating this and she would like to try this 1 more time to see if we get closer to 100% relief.  Will schedule her for an L5/S1 JOSUE and have her follow up in about 3 weeks after the injection.  Hopefully after this her symptoms are going to be manageable.

## 2022-04-28 NOTE — PROGRESS NOTES
This note was completed with dictation software and grammatical errors may exist.    CC:  Neck pain, low back pain    HPI:  The patient is a 26-year-old woman with a history of narcolepsy, TEA, fibromyalgia, presents in self-referral for neck pain.  She returns in follow-up today, status post L5/S1 JOSUE on 03/24/2022 with 100% relief of her radicular leg pain at times, 50% relief of her back pain overall.  She states that she still does get some leg pain with walking too much, back pain with sitting too long.  However she is able to sit much longer and walk much longer after the injection.  She is pleased with the results.  She has been doing physical therapy for her neck, she is status post fusion and feels that her overall balance and strength are definitely returning.  She feels that she can walk better now after her neck surgery as well, reports some occasional pain into the arms but this is greatly improved and she is pleased that she went through with the surgery.      Interval history 03/09/2022:  She returns in follow-up today with neck pain and low back pain.  Since her last visit she underwent C6-7 ACDF with Dr. Ball on 12/13/2021.  She has had relief of her left upper extremity symptoms but does continue to still have some left-sided neck pain radiating behind her left ear, along her left collar bone and in her left scapular region.  She reports some weaning in her left scapula and has been working to strengthen this.  Her main complaint today is low back and leg pain.  She reports soreness in the sacrum and tailbone region with sitting as well as cramping across the low back and upper buttocks.  She reports pain occasionally radiating to the groin but more consistently radiating to the lateral hips and lateral thighs.  She reports numbness and tingling in her legs a few times a week when her pain is severe.  She does still have some weakness in her left upper extremity but this has significantly improved  following her surgery.  She also reports improved numbness in her left upper extremity.  She denies bladder or bowel incontinence.    Previous history:  Patient reports having many years of neck pain, upper back pain, low back pain.  She has numbness and tingling in her left arm.  She has been diagnosed with fibromyalgia up but they had assessed her for Denzel-Danlos since she has family members who have this as well but apparently she did not meet all the diagnostic criteria.  Nonetheless she continues to have neck pain, worse to the right side in her right trapezius but numbness and tingling down the left arm into the hand.  She describes the pain is aching, shooting, tight, tingling.  Is worse with sitting, standing, bending, coughing or sneezing.  She gets relief with rest.  She has been taking gabapentin 600 twice daily with slight relief, also has had some slight benefit with ibuprofen and Flexeril.  She denies any roma weakness but does report having issues with dropping things several times a week.  She denies any balance issues but did note that in her review of systems.    Pain intervention history:  She returns in follow-up today, she is status post cervical JOSUE on 10/11/2021 with 25% relief of the neck pain. She returns in follow-up today, status post L5/S1 JOSUE on 03/24/2022 with 100% relief of her radicular leg pain at times, 50% relief of her back pain overall.     Spine surgeries:  C6-7 ACDF with Dr. Ball on 12/13/2021.     Antineuropathics:  Gabapentin 600 twice daily with moderate benefit  NSAIDs:  Taking ibuprofen with slight relief.  Physical therapy:  Currently in physical therapy for her neck in spring of 2022 doing well   Antidepressants:  Duloxetine 40  Muscle relaxers:  Flexeril slight benefit  Opioids:  Antiplatelets/Anticoagulants:    ROS:  She reports fatigue, headaches, shortness of breath, stomach pain, urinary urgency and frequency, back pain, memory loss, dizziness, anxiety and loss  "of balance.  Balance of review of systems is negative.    Lab Results   Component Value Date    HGBA1C 5.5 12/09/2021       Lab Results   Component Value Date    WBC 6.37 12/09/2021    HGB 12.9 12/09/2021    HCT 39.6 12/09/2021    MCV 88 12/09/2021     12/09/2021             Past Medical History:   Diagnosis Date    Anxiety     Asthma     Cervical radiculopathy 12/2021    Depression     Fibromyalgia     GERD (gastroesophageal reflux disease)     Headache(784.0)     Migraine     Narcolepsy     Obesity, Class II, BMI 35-39.9, no comorbidity 5/7/2016    Optic nerve edema     Sleep apnea     no cpap    Urinary tract infection     Vision abnormalities     glasses       Past Surgical History:   Procedure Laterality Date    ANTERIOR CERVICAL DISCECTOMY W/ FUSION N/A 12/13/2021    Procedure: DISCECTOMY, SPINE, CERVICAL, ANTERIOR APPROACH, WITH FUSION, C6-7;  Surgeon: Deven Ball MD;  Location: [facility] OR;  Service: Neurosurgery;  Laterality: N/A;    EPIDURAL STEROID INJECTION INTO CERVICAL SPINE N/A 10/11/2021    Procedure: Injection-steroid-epidural-cervical;  Surgeon: Charlie Jacques MD;  Location: [facility] OR;  Service: Pain Management;  Laterality: N/A;    EPIDURAL STEROID INJECTION INTO LUMBAR SPINE N/A 3/24/2022    Procedure: Injection-steroid-epidural-lumbar L5/S1;  Surgeon: Charlie Jacques MD;  Location: [facility] OR;  Service: Pain Management;  Laterality: N/A;    WISDOM TOOTH EXTRACTION         Social History     Socioeconomic History    Marital status: Single   Tobacco Use    Smoking status: Never Smoker    Smokeless tobacco: Never Used   Substance and Sexual Activity    Alcohol use: Yes     Comment: rarely    Drug use: No    Sexual activity: Yes     Partners: Male     Birth control/protection: Condom, OCP         Medications/Allergies: See med card    Vitals:    04/28/22 1013   BP: 128/80   Pulse: 91   Weight: 102.8 kg (226 lb 10.1 oz)   Height: 5' 3.6" (1.615 m)   PainSc:   6 "   PainLoc: Back         Physical exam:  Gen: A and O x3, pleasant, well-groomed  Skin: No rashes or obvious lesions  HEENT: PERRLA, no obvious deformities on ears or in canals.Trachea midline.  CVS: Regular rate and rhythm, normal palpable pulses.  Resp: Clear to auscultation bilaterally, no wheezes or rales.  Abdomen: Soft, NT/ND.  Musculoskeletal: Able to heel walk, toe walk. No antalgic gait.     Neuro:  Upper extremities: 5/5 strength bilaterally   Lower extremities:  5/5 strength bilaterally  Reflexes: Brachioradialis 3+ left, 2+ right, Bicep 3+ left, 2+ right, Tricep 3+ left, 2+ right. Patellar 3+ bilaterally, Achilles 2+ bilaterally  Sensory: Intact and symmetrical to light touch and pinprick in C2-T1 dermatomes bilaterally.    Lumbar spine:  Lumbar spine: ROM is full with flexion extension and oblique extension with mild increased pain in the back with extension  Regis's test causes increased pain in the groin on either side  Supine straight leg raise is negative bilaterally.    Internal and external rotation of the hip causes no increased pain on either side.  Myofascial exam: No tenderness to palpation across lumbar paraspinous muscles.      Imagin20 MRI C-spine:  Discs: Mild degenerative disc desiccation at C2-3 through C6-7.  No significant disc space narrowing.   Cord: Flattening of the right ventral aspect of the cervical cord at the C6-7 level.  No definite focal cord signal abnormality.   Skull base and craniocervical junction: Normal.   Degenerative findings:   C2-C3: The disc is normal in configuration. Mild bilateral facet arthropathy.  There is no neural foraminal stenosis.  There is no spinal canal stenosis.   C3-C4: The disc is normal in configuration.  Mild bilateral, left greater than right, facet arthropathy.  There is no neural foraminal stenosis.  There is no spinal canal stenosis.   C4-C5: Minimal posterior disc osteophyte complex.  Mild bilateral, left greater than right,  facet arthropathy.  Mild bilateral uncovertebral joint spurring.  There is no neural foraminal stenosis.  There is no spinal canal stenosis.   C5-C6: The disc is normal in configuration.  There is no facet arthropathy.  Mild left uncovertebral joint spurring.  Mild left neural foraminal stenosis.  There is no spinal canal stenosis.   C6-C7: Prominent right disc extrusion with partial extension into the right foramen, which flattens the right ventral cord surface and contributes to mild spinal canal stenosis and moderate right neural foraminal narrowing.  Mild bilateral facet arthropathy.  Mild right uncovertebral joint spurring.   C7-T1: The disc is normal in configuration.  There is no facet arthropathy.  There is no uncovertebral joint disease.  There is no neural foraminal stenosis.  There is no spinal canal stenosis.     11/10/2021 MRI cervical spine  Limited intracranial evaluation demonstrates no large mass effect or fluid collection.  No gross soft tissue defect or significant susceptibility artifact is appreciated. Osseous alignment appears maintained with no diffuse acute abnormal marrow or central cord signal appreciated. There is some straightening of the curvature overall.  This may be positional versus muscular spasm.  There is disc space narrowing with decreased disc water signal similar overall at the C6-7 level. There is multilevel facet, uncovertebral hypertrophy.   C2-3 demonstrates no significant interval spinal canal or neural foraminal narrowing.   C3-4 demonstrates no significant interval spinal canal or neural foraminal narrowing.   C4-5 demonstrates no significant interval spinal canal or neural foraminal narrowing with some marginal dorsal disc bulging.   C5-6 demonstrates some marginal dorsal disc bulging with minimal lateral recess, inferior neural foraminal narrowing bilaterally.   C6-7 demonstrates some dorsal disc bulging with large protrusion appearance at the right lateral recess,  neural foraminal origin indenting the adjacent thecal sac, cord.  There is some faint increased cord signal on the sagittal sequence suggestive early myelomalacia.  This contributes overall to mild left as well as moderate right neural foraminal origin narrowing.   C7-T1 demonstrates no significant interval spinal canal or neural foraminal narrowing.    11/29/2021 MRI lumbar spine  Five lumbar type vertebral bodies.  Straightening the normal lordotic curvature.  The alignment is normal.  The vertebral body heights are maintained without evidence for compression deformity, fracture, or subluxation.  Marrow signal pattern is normal.  Intervertebral disc space narrowing is identified at L4-5.  Disc desiccation is identified at L4-5 and L5-S1.   The conus medullaris is at the level of L1.  The proximal spinal cord is of normal caliber the distal nerve roots are normal.   T12-L1: Ligament flavum hypertrophy with degenerative change of facets.  No evidence for central canal stenosis or neural foraminal narrowing.   L1-L2: No significant abnormality.   L2-L3: Ligament flavum hypertrophy of subtle broad-based disc bulge without central canal stenosis or neural foraminal narrowing.   L3-L4: Subtle broad-based disc bulge with ligament flavum hypertrophy.  Indentation of the thecal sac without central canal stenosis or neural foraminal narrowing.   L4-L5: Broad-based disc bulge central disc component with indentation of the thecal sac.  No evidence for central canal stenosis.  Moderate bilateral neural foraminal narrowing with possible abutment of the exiting L4 nerve roots.   L5-S1: Broad-based disc bulge with central disc protrusion and increased T2 signal suggestive of annular fissure.  Indentation of the thecal sac without evidence for central canal stenosis.  Moderate to severe bilateral neural foraminal narrowing with abutment of the exiting L5 nerve roots.   The bilateral SI joints are normal.   The visualized hollow  and solid viscera within normal limits.    Assessment:   The patient is a 26-year-old woman with a history of narcolepsy, TEA, fibromyalgia, presents in self-referral for neck pain.   1. Lumbar radiculopathy  Vital signs    Verify informed consent    Notify physician     Notify physician     Notify physician (specify)    Diet NPO    Case Request Operating Room: Injection-steroid-epidural-lumbar L5/S1 L>R    Place in Outpatient    alprazolam ODT dissolvable tablet 1 mg   2. DDD (degenerative disc disease), lumbar     3. Cervical radiculopathy         Plan:  1. She continues to improve in terms of her neck, continuing physical therapy, we also discussed core strengthening exercises and the need to do this long-term for the best long-term benefit.  2. Since she did have relief with her back and leg with the epidural steroid injection but it is not 100%, we discussed repeating this and she would like to try this 1 more time to see if we get closer to 100% relief.  Will schedule her for an L5/S1 JOSUE and have her follow up in about 3 weeks after the injection.  Hopefully after this her symptoms are going to be manageable.

## 2022-05-04 RX ORDER — DULOXETINE 40 MG/1
1 CAPSULE, DELAYED RELEASE ORAL NIGHTLY
Qty: 90 CAPSULE | Refills: 3 | Status: CANCELLED | OUTPATIENT
Start: 2022-05-04

## 2022-05-04 RX ORDER — DULOXETINE 40 MG/1
1 CAPSULE, DELAYED RELEASE ORAL NIGHTLY
Qty: 90 CAPSULE | Refills: 3 | Status: SHIPPED | OUTPATIENT
Start: 2022-05-04 | End: 2022-12-09

## 2022-05-04 NOTE — TELEPHONE ENCOUNTER
----- Message from Heidy Mtaa sent at 5/4/2022  3:21 PM CDT -----  Type:  RX Refill Request    Who Called:  PT  Refill or New Rx:  Refill  RX Name and Strength:  Cymbalta 40mgs  How is the patient currently taking it? (ex. 1XDay):  1  Is this a 30 day or 90 day RX:  90  Preferred Pharmacy with phone number:  [id number] - [facility] [address];  Local or Mail Order:  Local  Ordering Provider:  [place] Call Back Number:  [phone]  Additional Information:  Please call and advise

## 2022-05-05 ENCOUNTER — HOSPITAL ENCOUNTER (OUTPATIENT)
Dept: RADIOLOGY | Facility: HOSPITAL | Age: 27
Discharge: HOME | End: 2022-05-05
Attending: ANESTHESIOLOGY
Payer: COMMERCIAL

## 2022-05-05 ENCOUNTER — HOSPITAL ENCOUNTER (OUTPATIENT)
Facility: HOSPITAL | Age: 27
Discharge: HOME | End: 2022-05-05
Attending: ANESTHESIOLOGY | Admitting: ANESTHESIOLOGY
Payer: COMMERCIAL

## 2022-05-05 VITALS
TEMPERATURE: 97 F | SYSTOLIC BLOOD PRESSURE: 111 MMHG | HEART RATE: 91 BPM | DIASTOLIC BLOOD PRESSURE: 74 MMHG | RESPIRATION RATE: 16 BRPM | OXYGEN SATURATION: 98 %

## 2022-05-05 DIAGNOSIS — M54.16 LUMBAR RADICULOPATHY: ICD-10-CM

## 2022-05-05 DIAGNOSIS — M54.50 LOWER BACK PAIN: ICD-10-CM

## 2022-05-05 LAB
B-HCG UR QL: NEGATIVE
CTP QC/QA: YES

## 2022-05-05 PROCEDURE — A4216 STERILE WATER/SALINE, 10 ML: HCPCS | Mod: PO | Performed by: ANESTHESIOLOGY

## 2022-05-05 PROCEDURE — 25500020 PHARM REV CODE 255: Mod: PO | Performed by: ANESTHESIOLOGY

## 2022-05-05 PROCEDURE — 62323 NJX INTERLAMINAR LMBR/SAC: CPT | Mod: PO | Performed by: ANESTHESIOLOGY

## 2022-05-05 PROCEDURE — 81025 URINE PREGNANCY TEST: CPT | Mod: PO | Performed by: ANESTHESIOLOGY

## 2022-05-05 PROCEDURE — 63600175 PHARM REV CODE 636 W HCPCS: Mod: PO | Performed by: ANESTHESIOLOGY

## 2022-05-05 PROCEDURE — 62323 NJX INTERLAMINAR LMBR/SAC: CPT | Mod: ,,, | Performed by: ANESTHESIOLOGY

## 2022-05-05 PROCEDURE — 76000 FLUOROSCOPY <1 HR PHYS/QHP: CPT | Mod: TC,PO

## 2022-05-05 PROCEDURE — 25000003 PHARM REV CODE 250: Mod: PO | Performed by: ANESTHESIOLOGY

## 2022-05-05 PROCEDURE — 62323 PR INJ LUMBAR/SACRAL, W/IMAGING GUIDANCE: ICD-10-PCS | Mod: ,,, | Performed by: ANESTHESIOLOGY

## 2022-05-05 RX ORDER — SODIUM CHLORIDE 9 MG/ML
INJECTION, SOLUTION INTRAMUSCULAR; INTRAVENOUS; SUBCUTANEOUS
Status: DISCONTINUED | OUTPATIENT
Start: 2022-05-05 | End: 2022-05-05 | Stop reason: HOSPADM

## 2022-05-05 RX ORDER — LIDOCAINE HYDROCHLORIDE 10 MG/ML
INJECTION, SOLUTION EPIDURAL; INFILTRATION; INTRACAUDAL; PERINEURAL
Status: DISCONTINUED | OUTPATIENT
Start: 2022-05-05 | End: 2022-05-05 | Stop reason: HOSPADM

## 2022-05-05 RX ORDER — METHYLPREDNISOLONE ACETATE 80 MG/ML
INJECTION, SUSPENSION INTRA-ARTICULAR; INTRALESIONAL; INTRAMUSCULAR; SOFT TISSUE
Status: DISCONTINUED | OUTPATIENT
Start: 2022-05-05 | End: 2022-05-05 | Stop reason: HOSPADM

## 2022-05-05 RX ORDER — ALPRAZOLAM 0.5 MG/1
1 TABLET, ORALLY DISINTEGRATING ORAL ONCE AS NEEDED
Status: COMPLETED | OUTPATIENT
Start: 2022-05-05 | End: 2022-05-05

## 2022-05-05 RX ADMIN — ALPRAZOLAM 1 MG: 0.5 TABLET, ORALLY DISINTEGRATING ORAL at 02:05

## 2022-05-05 NOTE — DISCHARGE INSTRUCTIONS

## 2022-05-05 NOTE — OP NOTE

## 2022-05-05 NOTE — DISCHARGE SUMMARY
[facility] - Surgery  Discharge Note  Short Stay    Procedure(s) (LRB):  Injection-steroid-epidural-lumbar L5/S1 L>R (N/A)    OUTCOME: Patient tolerated treatment/procedure well without complication and is now ready for discharge.    DISPOSITION: Home or Self Care    FINAL DIAGNOSIS:  Lumbar radiculopathy    FOLLOWUP: In clinic    DISCHARGE INSTRUCTIONS:    Discharge Procedure Orders   Diet Adult Regular     Diet Adult Regular     No dressing needed     Notify your health care provider if you experience any of the following:  temperature >100.4     No dressing needed     Notify your health care provider if you experience any of the following:  temperature >100.4     Activity as tolerated     Activity as tolerated

## 2022-05-06 ENCOUNTER — OFFICE VISIT (OUTPATIENT)
Dept: FAMILY MEDICINE | Facility: CLINIC | Age: 27
End: 2022-05-06
Payer: COMMERCIAL

## 2022-05-06 DIAGNOSIS — F33.0 MDD (MAJOR DEPRESSIVE DISORDER), RECURRENT EPISODE, MILD: ICD-10-CM

## 2022-05-06 DIAGNOSIS — L72.3 SEBACEOUS CYST: Primary | ICD-10-CM

## 2022-05-06 DIAGNOSIS — E66.01 SEVERE OBESITY (BMI 35.0-39.9) WITH COMORBIDITY: ICD-10-CM

## 2022-05-06 PROCEDURE — 1159F MED LIST DOCD IN RCRD: CPT | Mod: CPTII,S$GLB,, | Performed by: NURSE PRACTITIONER

## 2022-05-06 PROCEDURE — 3078F DIAST BP <80 MM HG: CPT | Mod: CPTII,S$GLB,, | Performed by: NURSE PRACTITIONER

## 2022-05-06 PROCEDURE — 3074F PR MOST RECENT SYSTOLIC BLOOD PRESSURE < 130 MM HG: ICD-10-PCS | Mod: CPTII,S$GLB,, | Performed by: NURSE PRACTITIONER

## 2022-05-06 PROCEDURE — 1159F PR MEDICATION LIST DOCUMENTED IN MEDICAL RECORD: ICD-10-PCS | Mod: CPTII,S$GLB,, | Performed by: NURSE PRACTITIONER

## 2022-05-06 PROCEDURE — 99214 PR OFFICE/OUTPT VISIT, EST, LEVL IV, 30-39 MIN: ICD-10-PCS | Mod: S$GLB,,, | Performed by: NURSE PRACTITIONER

## 2022-05-06 PROCEDURE — 3008F PR BODY MASS INDEX (BMI) DOCUMENTED: ICD-10-PCS | Mod: CPTII,S$GLB,, | Performed by: NURSE PRACTITIONER

## 2022-05-06 PROCEDURE — 3078F PR MOST RECENT DIASTOLIC BLOOD PRESSURE < 80 MM HG: ICD-10-PCS | Mod: CPTII,S$GLB,, | Performed by: NURSE PRACTITIONER

## 2022-05-06 PROCEDURE — 3008F BODY MASS INDEX DOCD: CPT | Mod: CPTII,S$GLB,, | Performed by: NURSE PRACTITIONER

## 2022-05-06 PROCEDURE — 3074F SYST BP LT 130 MM HG: CPT | Mod: CPTII,S$GLB,, | Performed by: NURSE PRACTITIONER

## 2022-05-06 PROCEDURE — 99214 OFFICE O/P EST MOD 30 MIN: CPT | Mod: S$GLB,,, | Performed by: NURSE PRACTITIONER

## 2022-05-06 RX ORDER — TIZANIDINE 4 MG/1
4 TABLET ORAL 2 TIMES DAILY
COMMUNITY
Start: 2022-05-04 | End: 2022-06-26

## 2022-05-06 NOTE — PROGRESS NOTES
"Subjective:       Patient ID: Renetta Durham is a 26 y.o. female.    Chief Complaint: Cyst (Back)  pt with sebaceous cyst to upper back. She would like this removed. Pt has had this for months.    Pt has MDD on cymbalta, stable, no SI/HI.     HPI  Review of Systems   Constitutional: Negative for appetite change, chills and fever.   HENT: Negative for ear pain and postnasal drip.    Eyes: Negative for pain and itching.   Respiratory: Negative for chest tightness and shortness of breath.    Gastrointestinal: Negative for abdominal distention and abdominal pain.   Endocrine: Negative for polydipsia and polyuria.   Genitourinary: Negative for difficulty urinating and flank pain.   Skin: Negative for color change and pallor.   Neurological: Negative for light-headedness and headaches.   Hematological: Negative for adenopathy. Does not bruise/bleed easily.   Psychiatric/Behavioral: Negative for agitation.       Past medical, surgical, family and social history reviewed.  Objective:     Vitals:    05/06/22 1431   BP: 128/76   Pulse: 99   Resp: 20   Temp: 98.5 °F (36.9 °C)   SpO2: 98%   Weight: 102.1 kg (225 lb 1.4 oz)   Height: 5' 3" (1.6 m)   PainSc: 0-No pain     Body mass index is 39.87 kg/m².     Physical Exam  Constitutional:       General: She is not in acute distress.     Appearance: She is well-developed. She is obese. She is not diaphoretic.   HENT:      Head: Normocephalic and atraumatic.      Right Ear: Hearing, tympanic membrane, ear canal and external ear normal.      Left Ear: Hearing, tympanic membrane, ear canal and external ear normal.      Nose: Nose normal.      Mouth/Throat:      Pharynx: Uvula midline.   Eyes:      General:         Right eye: No discharge.         Left eye: No discharge.      Conjunctiva/sclera: Conjunctivae normal.      Pupils: Pupils are equal, round, and reactive to light.   Neck:      Thyroid: No thyromegaly.      Vascular: No carotid bruit or JVD.      Trachea: Trachea normal. "   Cardiovascular:      Rate and Rhythm: Normal rate and regular rhythm.      Heart sounds: No murmur heard.    No friction rub. No gallop.   Pulmonary:      Effort: Pulmonary effort is normal. No respiratory distress.      Breath sounds: Normal breath sounds. No wheezing or rales.   Chest:      Chest wall: No tenderness.   Abdominal:      General: Bowel sounds are normal. There is no distension.      Palpations: Abdomen is soft. There is no mass.      Tenderness: There is no abdominal tenderness. There is no guarding or rebound.   Musculoskeletal:         General: Normal range of motion.      Cervical back: Normal range of motion and neck supple.   Skin:     General: Skin is warm and dry.   Neurological:      Mental Status: She is alert and oriented to person, place, and time.      Coordination: Coordination normal.   Psychiatric:         Behavior: Behavior normal.         Thought Content: Thought content normal.         Judgment: Judgment normal.       large cyst to right upper back  Assessment:       1. Sebaceous cyst    2. Severe obesity (BMI 35.0-39.9) with comorbidity    3. MDD (major depressive disorder), recurrent episode, mild        Plan:       Renetta was seen today for cyst.    Diagnoses and all orders for this visit:    Sebaceous cyst  -     Ambulatory referral/consult to Dermatology; Future    Severe obesity (BMI 35.0-39.9) with comorbidity  Morbid obesity    MDD (major depressive disorder), recurrent episode, mild    Continue Cymbalta       MDD-stable on cymbalta      I spent 30 minutes on this encounter, time includes face-to-face, chart review, documentation, test review and orders.

## 2022-05-10 VITALS
BODY MASS INDEX: 39.88 KG/M2 | OXYGEN SATURATION: 98 % | HEIGHT: 63 IN | WEIGHT: 225.06 LBS | RESPIRATION RATE: 20 BRPM | SYSTOLIC BLOOD PRESSURE: 128 MMHG | DIASTOLIC BLOOD PRESSURE: 76 MMHG | HEART RATE: 99 BPM | TEMPERATURE: 99 F

## 2022-05-18 ENCOUNTER — OFFICE VISIT (OUTPATIENT)
Dept: OBSTETRICS AND GYNECOLOGY | Facility: CLINIC | Age: 27
End: 2022-05-18
Payer: COMMERCIAL

## 2022-05-18 VITALS
BODY MASS INDEX: 39.91 KG/M2 | WEIGHT: 225.31 LBS | DIASTOLIC BLOOD PRESSURE: 76 MMHG | SYSTOLIC BLOOD PRESSURE: 108 MMHG

## 2022-05-18 DIAGNOSIS — Z30.9 ENCOUNTER FOR CONTRACEPTIVE MANAGEMENT, UNSPECIFIED TYPE: ICD-10-CM

## 2022-05-18 DIAGNOSIS — Z01.419 WELL WOMAN EXAM: Primary | ICD-10-CM

## 2022-05-18 LAB
B-HCG UR QL: NEGATIVE
CTP QC/QA: YES

## 2022-05-18 PROCEDURE — 3078F PR MOST RECENT DIASTOLIC BLOOD PRESSURE < 80 MM HG: ICD-10-PCS | Mod: CPTII,S$GLB,, | Performed by: STUDENT IN AN ORGANIZED HEALTH CARE EDUCATION/TRAINING PROGRAM

## 2022-05-18 PROCEDURE — 81025 URINE PREGNANCY TEST: CPT | Mod: S$GLB,,, | Performed by: STUDENT IN AN ORGANIZED HEALTH CARE EDUCATION/TRAINING PROGRAM

## 2022-05-18 PROCEDURE — 88175 CYTOPATH C/V AUTO FLUID REDO: CPT | Performed by: STUDENT IN AN ORGANIZED HEALTH CARE EDUCATION/TRAINING PROGRAM

## 2022-05-18 PROCEDURE — 3074F PR MOST RECENT SYSTOLIC BLOOD PRESSURE < 130 MM HG: ICD-10-PCS | Mod: CPTII,S$GLB,, | Performed by: STUDENT IN AN ORGANIZED HEALTH CARE EDUCATION/TRAINING PROGRAM

## 2022-05-18 PROCEDURE — 87591 N.GONORRHOEAE DNA AMP PROB: CPT | Performed by: STUDENT IN AN ORGANIZED HEALTH CARE EDUCATION/TRAINING PROGRAM

## 2022-05-18 PROCEDURE — 3008F PR BODY MASS INDEX (BMI) DOCUMENTED: ICD-10-PCS | Mod: CPTII,S$GLB,, | Performed by: STUDENT IN AN ORGANIZED HEALTH CARE EDUCATION/TRAINING PROGRAM

## 2022-05-18 PROCEDURE — 3074F SYST BP LT 130 MM HG: CPT | Mod: CPTII,S$GLB,, | Performed by: STUDENT IN AN ORGANIZED HEALTH CARE EDUCATION/TRAINING PROGRAM

## 2022-05-18 PROCEDURE — 81025 POCT URINE PREGNANCY: ICD-10-PCS | Mod: S$GLB,,, | Performed by: STUDENT IN AN ORGANIZED HEALTH CARE EDUCATION/TRAINING PROGRAM

## 2022-05-18 PROCEDURE — 99999 PR PBB SHADOW E&M-EST. PATIENT-LVL III: ICD-10-PCS | Mod: PBBFAC,,, | Performed by: STUDENT IN AN ORGANIZED HEALTH CARE EDUCATION/TRAINING PROGRAM

## 2022-05-18 PROCEDURE — 3078F DIAST BP <80 MM HG: CPT | Mod: CPTII,S$GLB,, | Performed by: STUDENT IN AN ORGANIZED HEALTH CARE EDUCATION/TRAINING PROGRAM

## 2022-05-18 PROCEDURE — 99999 PR PBB SHADOW E&M-EST. PATIENT-LVL III: CPT | Mod: PBBFAC,,, | Performed by: STUDENT IN AN ORGANIZED HEALTH CARE EDUCATION/TRAINING PROGRAM

## 2022-05-18 PROCEDURE — 99395 PREV VISIT EST AGE 18-39: CPT | Mod: S$GLB,,, | Performed by: STUDENT IN AN ORGANIZED HEALTH CARE EDUCATION/TRAINING PROGRAM

## 2022-05-18 PROCEDURE — 3008F BODY MASS INDEX DOCD: CPT | Mod: CPTII,S$GLB,, | Performed by: STUDENT IN AN ORGANIZED HEALTH CARE EDUCATION/TRAINING PROGRAM

## 2022-05-18 PROCEDURE — 1159F PR MEDICATION LIST DOCUMENTED IN MEDICAL RECORD: ICD-10-PCS | Mod: CPTII,S$GLB,, | Performed by: STUDENT IN AN ORGANIZED HEALTH CARE EDUCATION/TRAINING PROGRAM

## 2022-05-18 PROCEDURE — 87491 CHLMYD TRACH DNA AMP PROBE: CPT | Performed by: STUDENT IN AN ORGANIZED HEALTH CARE EDUCATION/TRAINING PROGRAM

## 2022-05-18 PROCEDURE — 1159F MED LIST DOCD IN RCRD: CPT | Mod: CPTII,S$GLB,, | Performed by: STUDENT IN AN ORGANIZED HEALTH CARE EDUCATION/TRAINING PROGRAM

## 2022-05-18 PROCEDURE — 99395 PR PREVENTIVE VISIT,EST,18-39: ICD-10-PCS | Mod: S$GLB,,, | Performed by: STUDENT IN AN ORGANIZED HEALTH CARE EDUCATION/TRAINING PROGRAM

## 2022-05-18 RX ORDER — NORGESTIMATE AND ETHINYL ESTRADIOL 0.25-0.035
1 KIT ORAL DAILY
Qty: 28 TABLET | Refills: 11 | Status: SHIPPED | OUTPATIENT
Start: 2022-05-18 | End: 2023-04-13 | Stop reason: SDUPTHER

## 2022-05-18 NOTE — PROGRESS NOTES
History & Physical  Gynecology      SUBJECTIVE:     Chief Complaint: Establish Care, Annual Exam, and Well Woman       History of Present Illness:    26 y.o. presents today for well woman exam. Doing well, no issues.     Gyn: regular light periods, on OCPs. Denies hx of abnormal pap or STI. Sexually active, OCPs for contraception. No immediate FH of gyn or colon cancer.     No tobacco    Not working currently       Review of patient's allergies indicates:   Allergen Reactions    No known drug allergies        Past Medical History:   Diagnosis Date    Anxiety     Asthma     Cervical radiculopathy 12/2021    Depression     Fibromyalgia     GERD (gastroesophageal reflux disease)     Headache(784.0)     Migraine     Narcolepsy     Obesity, Class II, BMI 35-39.9, no comorbidity 5/7/2016    Optic nerve edema     Sleep apnea     no cpap    Urinary tract infection     Vision abnormalities     glasses     Past Surgical History:   Procedure Laterality Date    ANTERIOR CERVICAL DISCECTOMY W/ FUSION N/A 12/13/2021    Procedure: DISCECTOMY, SPINE, CERVICAL, ANTERIOR APPROACH, WITH FUSION, C6-7;  Surgeon: Deven Ball MD;  Location: [facility];  Service: Neurosurgery;  Laterality: N/A;    EPIDURAL STEROID INJECTION INTO CERVICAL SPINE N/A 10/11/2021    Procedure: Injection-steroid-epidural-cervical;  Surgeon: Charlie Jacques MD;  Location: [facility] OR;  Service: Pain Management;  Laterality: N/A;    EPIDURAL STEROID INJECTION INTO LUMBAR SPINE N/A 3/24/2022    Procedure: Injection-steroid-epidural-lumbar L5/S1;  Surgeon: Charlie Jacques MD;  Location: [facility] OR;  Service: Pain Management;  Laterality: N/A;    EPIDURAL STEROID INJECTION INTO LUMBAR SPINE N/A 5/5/2022    Procedure: Injection-steroid-epidural-lumbar L5/S1 L>R;  Surgeon: Charlie Jacques MD;  Location: [facility] OR;  Service: Pain Management;  Laterality: N/A;    WISDOM TOOTH EXTRACTION       OB History    No obstetric history on file.       Family  History   Problem Relation Age of Onset    Mental illness Other     Arthritis Mother     Asthma Sister         half sister    Miscarriages / Stillbirths Sister         half sister    Vision loss Maternal Aunt     Cancer Maternal Aunt     Breast cancer Maternal Aunt     Arthritis Maternal Grandmother     Cataracts Maternal Grandmother     Cancer Maternal Grandfather         colon, liver, lungs    Diabetes Paternal Grandmother     Heart disease Paternal Grandmother     Hypertension Paternal Grandmother     COPD Paternal Grandfather     Ulcerative colitis Father     Other Father         recurrent staff infections    Amblyopia Neg Hx     Blindness Neg Hx     Glaucoma Neg Hx     Macular degeneration Neg Hx     Retinal detachment Neg Hx     Strabismus Neg Hx     Stroke Neg Hx     Thyroid disease Neg Hx     Ovarian cancer Neg Hx      Social History     Tobacco Use    Smoking status: Never Smoker    Smokeless tobacco: Never Used   Substance Use Topics    Alcohol use: Yes     Comment: rarely    Drug use: No       Current Outpatient Medications   Medication Sig    dextroamphetamine-amphetamine (ADDERALL XR) 30 MG 24 hr capsule Take by mouth every morning.    DULoxetine 40 mg CpDR Take 1 capsule by mouth every evening.    fluticasone (FLONASE) 50 mcg/actuation nasal spray 1 spray by Each Nare route once daily. (Patient taking differently: 1 spray by Each Nostril route daily as needed.)    gabapentin (NEURONTIN) 300 MG capsule 300mg in AM, 300mg noon  and 600mg at HS    norgestimate-ethinyl estradioL (SPRINTEC, 28,) 0.25-35 mg-mcg per tablet Take 1 tablet by mouth once daily.    pantoprazole (PROTONIX) 40 MG tablet Take 1 tablet (40 mg total) by mouth Daily. (Patient taking differently: Take 40 mg by mouth every evening.)    rimegepant (NURTEC) 75 mg odt Take 1 tablet (75 mg total) by mouth daily as needed. Place ODT tablet on the tongue; alternatively the ODT tablet may be placed under the  tongue    tiZANidine (ZANAFLEX) 4 MG tablet Take 4 mg by mouth 2 (two) times daily.    UNABLE TO FIND Physical Therapy - evaluate and treat this patient with EDS  Dr. Mildred Philippe [phone]     Current Facility-Administered Medications   Medication    onabotulinumtoxina injection 200 Units         Review of Systems:  Review of Systems   Constitutional: Negative for chills, fatigue and fever.   HENT: Negative for congestion.    Eyes: Negative for visual disturbance.   Respiratory: Negative for cough and shortness of breath.    Cardiovascular: Negative for chest pain and palpitations.   Gastrointestinal: Negative for abdominal distention, abdominal pain, constipation, diarrhea, nausea and vomiting.   Genitourinary: Negative for difficulty urinating, dysuria, hematuria, vaginal bleeding and vaginal discharge.   Skin: Negative for rash.   Neurological: Negative for dizziness, seizures, light-headedness and headaches.   Hematological: Does not bruise/bleed easily.   Psychiatric/Behavioral: Negative for dysphoric mood. The patient is not nervous/anxious.         OBJECTIVE:     Physical Exam:  Physical Exam  Vitals reviewed.   Constitutional:       General: She is not in acute distress.     Appearance: She is well-developed.   HENT:      Head: Normocephalic and atraumatic.   Cardiovascular:      Rate and Rhythm: Normal rate and regular rhythm.   Pulmonary:      Effort: Pulmonary effort is normal.   Chest:   Breasts:      Right: No inverted nipple, mass, nipple discharge, skin change or tenderness.      Left: No inverted nipple, mass, nipple discharge, skin change or tenderness.       Abdominal:      General: There is no distension.      Palpations: Abdomen is soft.   Genitourinary:     Vagina: Normal.      Comments: Normal external female genitalia, normal hair distribution. Vaginal mucosa pink, moist, well rugated, scant white physiologic discharge. No blood in vault. Cervix pink, non-friable, without lesion. No  CMT. Uterus non tender, mobile, not enlarged. Adnexa without fullness or tenderness.    Skin:     General: Skin is warm.   Neurological:      Mental Status: She is alert and oriented to person, place, and time.   Psychiatric:         Behavior: Behavior normal.         Thought Content: Thought content normal.         Judgment: Judgment normal.           ASSESSMENT:       ICD-10-CM ICD-9-CM    1. Well woman exam  Z01.419 V72.31 Liquid-Based Pap Smear, Screening      C. trachomatis/N. gonorrhoeae by AMP DNA   2. Encounter for contraceptive management, unspecified type  Z30.9 V25.9 norgestimate-ethinyl estradioL (SPRINTEC, 28,) 0.25-35 mg-mcg per tablet      POCT urine pregnancy       Orders Placed This Encounter   Procedures    C. trachomatis/N. gonorrhoeae by AMP DNA     Order Specific Question:   Source:     Answer:   Cervicovaginal    POCT urine pregnancy           Plan:      Well woman:  - Pap today   - tobacco cessation n/a  - contraception OCP, refill given   - HPV vaccine s/p   - Safe Sex discussed   - Obesity: Patient was counseled today regarding her diagnosis of obesity. We discussed her BMI and that normal BMI should be less than 25. We discussed the role obesity plays in her overall health, including increased risk for or worsening of diabetes, hypertension and cardiovascular disease. I recommend starting with a goal of losing 5% of her body weight. We discussed strategies for weight loss including appropriate portion sizes of healthier food choices such as grilled or baked protein sources, increasing fruits and vegetables while decreasing carbohydrate intake. Also discussed increasing physical activity in daily routine, such as taking stairs instead of elevators, parking at the back of the parking lot and walking into stores, and exercise when able.  Informational handouts on diet, exercise and obesity were given to the patient.   - Counseled to take daily multivitamin. If patient is of reproductive age  and not on contraception, to take prenatal vitamin. Patient has been counseled on the vitamin D and calcium requirements per ACOG recommendations.  Age   Calcium(mg/day)   Vitamin D (IU/day)  9-18    1300                       600  19-50  1000                       600  51-70  1200                       600  >70      1,200                      800  Patient to start daily vitamin   - Covid vaccine n/a  - Discussed IPV, feels safe   - screening GC/CT    Edilma Castaneda M.D.  Obstetrics and Gynecology

## 2022-05-19 LAB
C TRACH DNA SPEC QL NAA+PROBE: NOT DETECTED
N GONORRHOEA DNA SPEC QL NAA+PROBE: NOT DETECTED

## 2022-06-03 ENCOUNTER — TELEPHONE (OUTPATIENT)
Dept: DERMATOLOGY | Facility: CLINIC | Age: 27
End: 2022-06-03
Payer: COMMERCIAL

## 2022-06-06 ENCOUNTER — TELEPHONE (OUTPATIENT)
Dept: DERMATOLOGY | Facility: CLINIC | Age: 27
End: 2022-06-06
Payer: COMMERCIAL

## 2022-06-07 ENCOUNTER — TELEPHONE (OUTPATIENT)
Dept: DERMATOLOGY | Facility: CLINIC | Age: 27
End: 2022-06-07
Payer: COMMERCIAL

## 2022-06-07 NOTE — TELEPHONE ENCOUNTER
Called patient to schedule Dermatology appointment.  Left voicemail to call [phone].  3rd attempt

## 2022-07-15 ENCOUNTER — HOSPITAL ENCOUNTER (OUTPATIENT)
Dept: RADIOLOGY | Facility: HOSPITAL | Age: 27
Discharge: HOME | End: 2022-07-15
Attending: PHYSICIAN ASSISTANT
Payer: COMMERCIAL

## 2022-07-15 DIAGNOSIS — Z98.1 S/P CERVICAL SPINAL FUSION: ICD-10-CM

## 2022-07-15 PROCEDURE — 72040 XR CERVICAL SPINE AP LATERAL: ICD-10-PCS | Mod: 26,,, | Performed by: RADIOLOGY

## 2022-07-15 PROCEDURE — 72040 X-RAY EXAM NECK SPINE 2-3 VW: CPT | Mod: TC,FY,PO

## 2022-07-15 PROCEDURE — 72040 X-RAY EXAM NECK SPINE 2-3 VW: CPT | Mod: 26,,, | Performed by: RADIOLOGY

## 2022-08-10 ENCOUNTER — OFFICE VISIT (OUTPATIENT)
Dept: PAIN MEDICINE | Facility: CLINIC | Age: 27
End: 2022-08-10
Payer: COMMERCIAL

## 2022-08-10 VITALS
SYSTOLIC BLOOD PRESSURE: 113 MMHG | WEIGHT: 225.19 LBS | DIASTOLIC BLOOD PRESSURE: 71 MMHG | BODY MASS INDEX: 39.9 KG/M2 | HEIGHT: 63 IN | HEART RATE: 91 BPM

## 2022-08-10 DIAGNOSIS — M51.36 DDD (DEGENERATIVE DISC DISEASE), LUMBAR: ICD-10-CM

## 2022-08-10 DIAGNOSIS — M47.816 LUMBAR SPONDYLOSIS: Primary | ICD-10-CM

## 2022-08-10 DIAGNOSIS — M50.30 DDD (DEGENERATIVE DISC DISEASE), CERVICAL: ICD-10-CM

## 2022-08-10 PROCEDURE — 3078F DIAST BP <80 MM HG: CPT | Mod: CPTII,S$GLB,, | Performed by: PHYSICIAN ASSISTANT

## 2022-08-10 PROCEDURE — 99999 PR PBB SHADOW E&M-EST. PATIENT-LVL V: CPT | Mod: PBBFAC,,, | Performed by: PHYSICIAN ASSISTANT

## 2022-08-10 PROCEDURE — 3008F PR BODY MASS INDEX (BMI) DOCUMENTED: ICD-10-PCS | Mod: CPTII,S$GLB,, | Performed by: PHYSICIAN ASSISTANT

## 2022-08-10 PROCEDURE — 99214 PR OFFICE/OUTPT VISIT, EST, LEVL IV, 30-39 MIN: ICD-10-PCS | Mod: S$GLB,,, | Performed by: PHYSICIAN ASSISTANT

## 2022-08-10 PROCEDURE — 1160F PR REVIEW ALL MEDS BY PRESCRIBER/CLIN PHARMACIST DOCUMENTED: ICD-10-PCS | Mod: CPTII,S$GLB,, | Performed by: PHYSICIAN ASSISTANT

## 2022-08-10 PROCEDURE — 99214 OFFICE O/P EST MOD 30 MIN: CPT | Mod: S$GLB,,, | Performed by: PHYSICIAN ASSISTANT

## 2022-08-10 PROCEDURE — 99999 PR PBB SHADOW E&M-EST. PATIENT-LVL V: ICD-10-PCS | Mod: PBBFAC,,, | Performed by: PHYSICIAN ASSISTANT

## 2022-08-10 PROCEDURE — 3008F BODY MASS INDEX DOCD: CPT | Mod: CPTII,S$GLB,, | Performed by: PHYSICIAN ASSISTANT

## 2022-08-10 PROCEDURE — 3074F PR MOST RECENT SYSTOLIC BLOOD PRESSURE < 130 MM HG: ICD-10-PCS | Mod: CPTII,S$GLB,, | Performed by: PHYSICIAN ASSISTANT

## 2022-08-10 PROCEDURE — 1160F RVW MEDS BY RX/DR IN RCRD: CPT | Mod: CPTII,S$GLB,, | Performed by: PHYSICIAN ASSISTANT

## 2022-08-10 PROCEDURE — 1159F PR MEDICATION LIST DOCUMENTED IN MEDICAL RECORD: ICD-10-PCS | Mod: CPTII,S$GLB,, | Performed by: PHYSICIAN ASSISTANT

## 2022-08-10 PROCEDURE — 3074F SYST BP LT 130 MM HG: CPT | Mod: CPTII,S$GLB,, | Performed by: PHYSICIAN ASSISTANT

## 2022-08-10 PROCEDURE — 3078F PR MOST RECENT DIASTOLIC BLOOD PRESSURE < 80 MM HG: ICD-10-PCS | Mod: CPTII,S$GLB,, | Performed by: PHYSICIAN ASSISTANT

## 2022-08-10 PROCEDURE — 1159F MED LIST DOCD IN RCRD: CPT | Mod: CPTII,S$GLB,, | Performed by: PHYSICIAN ASSISTANT

## 2022-08-10 RX ORDER — BUSPIRONE HYDROCHLORIDE 15 MG/1
15 TABLET ORAL NIGHTLY
COMMUNITY
Start: 2022-07-22

## 2022-08-10 RX ORDER — DULOXETIN HYDROCHLORIDE 60 MG/1
60 CAPSULE, DELAYED RELEASE ORAL DAILY
COMMUNITY
Start: 2022-07-22

## 2022-08-10 RX ORDER — ARIPIPRAZOLE 2 MG/1
2 TABLET ORAL EVERY MORNING
COMMUNITY
Start: 2022-07-14 | End: 2022-12-09

## 2022-08-10 RX ORDER — SODIUM CHLORIDE, SODIUM LACTATE, POTASSIUM CHLORIDE, CALCIUM CHLORIDE 600; 310; 30; 20 MG/100ML; MG/100ML; MG/100ML; MG/100ML
INJECTION, SOLUTION INTRAVENOUS CONTINUOUS
Status: CANCELLED | OUTPATIENT
Start: 2022-08-10

## 2022-08-14 NOTE — PROGRESS NOTES
This note was completed with dictation software and grammatical errors may exist.    CC:  Neck pain, low back pain    HPI:  The patient is a 27-year-old woman with a history of narcolepsy, TEA, fibromyalgia, presents in self-referral for neck pain.  She is status post L5/S1 interlaminar epidural steroid injection on 05/05/2022 with 50% relief.  She does continue to have some pain across the lower lumbar region, left greater than right but reports worse pain in the mid to upper lumbar region.  This is worse with standing, prolonged sitting and with repetitive bending.  She started a new job working twice a week at [employer] saying she wear a which has been causing some increased pain.  She continues to do her home exercise program.  She also continues to have some upper neck pain radiating to the occipital regions.  She states that her upper extremity weakness has been improving, denies any new weakness.  She denies numbness or incontinence.      Interval history 03/09/2022:  She returns in follow-up today with neck pain and low back pain.  Since her last visit she underwent C6-7 ACDF with Dr. Ball on 12/13/2021.  She has had relief of her left upper extremity symptoms but does continue to still have some left-sided neck pain radiating behind her left ear, along her left collar bone and in her left scapular region.  She reports some weaning in her left scapula and has been working to strengthen this.  Her main complaint today is low back and leg pain.  She reports soreness in the sacrum and tailbone region with sitting as well as cramping across the low back and upper buttocks.  She reports pain occasionally radiating to the groin but more consistently radiating to the lateral hips and lateral thighs.  She reports numbness and tingling in her legs a few times a week when her pain is severe.  She does still have some weakness in her left upper extremity but this has significantly improved following her surgery.   She also reports improved numbness in her left upper extremity.  She denies bladder or bowel incontinence.    Previous history:  Patient reports having many years of neck pain, upper back pain, low back pain.  She has numbness and tingling in her left arm.  She has been diagnosed with fibromyalgia up but they had assessed her for Denzel-Danlos since she has family members who have this as well but apparently she did not meet all the diagnostic criteria.  Nonetheless she continues to have neck pain, worse to the right side in her right trapezius but numbness and tingling down the left arm into the hand.  She describes the pain is aching, shooting, tight, tingling.  Is worse with sitting, standing, bending, coughing or sneezing.  She gets relief with rest.  She has been taking gabapentin 600 twice daily with slight relief, also has had some slight benefit with ibuprofen and Flexeril.  She denies any roma weakness but does report having issues with dropping things several times a week.  She denies any balance issues but did note that in her review of systems.    Pain intervention history:  She returns in follow-up today, she is status post cervical JOSUE on 10/11/2021 with 25% relief of the neck pain. She returns in follow-up today, status post L5/S1 JOSUE on 03/24/2022 with 100% relief of her radicular leg pain at times, 50% relief of her back pain overall.   She is status post L5/S1 interlaminar epidural steroid injection on 05/05/2022 with 50% relief.     Spine surgeries:  C6-7 ACDF with Dr. Ball on 12/13/2021.     Antineuropathics:  Gabapentin 600 twice daily with moderate benefit  NSAIDs:  Taking ibuprofen with slight relief.  Physical therapy:  Currently in physical therapy for her neck in spring of 2022 doing well   Antidepressants:  Duloxetine 40  Muscle relaxers:  Flexeril slight benefit  Opioids:  Antiplatelets/Anticoagulants:    ROS:  She reports fatigue, headaches, shortness of breath, stomach pain, urinary  urgency and frequency, back pain, memory loss, dizziness, anxiety and loss of balance.  Balance of review of systems is negative.    Lab Results   Component Value Date    HGBA1C 5.5 12/09/2021       Lab Results   Component Value Date    WBC 6.37 12/09/2021    HGB 12.9 12/09/2021    HCT 39.6 12/09/2021    MCV 88 12/09/2021     12/09/2021             Past Medical History:   Diagnosis Date    Anxiety     Asthma     Cervical radiculopathy 12/2021    Depression     Fibromyalgia     GERD (gastroesophageal reflux disease)     Headache(784.0)     Migraine     Narcolepsy     Obesity, Class II, BMI 35-39.9, no comorbidity 5/7/2016    Optic nerve edema     Sleep apnea     no cpap    Urinary tract infection     Vision abnormalities     glasses       Past Surgical History:   Procedure Laterality Date    ANTERIOR CERVICAL DISCECTOMY W/ FUSION N/A 12/13/2021    Procedure: DISCECTOMY, SPINE, CERVICAL, ANTERIOR APPROACH, WITH FUSION, C6-7;  Surgeon: Deven Ball MD;  Location: [facility];  Service: Neurosurgery;  Laterality: N/A;    EPIDURAL STEROID INJECTION INTO CERVICAL SPINE N/A 10/11/2021    Procedure: Injection-steroid-epidural-cervical;  Surgeon: Charlie Jacques MD;  Location: [facility] OR;  Service: Pain Management;  Laterality: N/A;    EPIDURAL STEROID INJECTION INTO LUMBAR SPINE N/A 3/24/2022    Procedure: Injection-steroid-epidural-lumbar L5/S1;  Surgeon: Charlie Jacques MD;  Location: [facility] OR;  Service: Pain Management;  Laterality: N/A;    EPIDURAL STEROID INJECTION INTO LUMBAR SPINE N/A 5/5/2022    Procedure: Injection-steroid-epidural-lumbar L5/S1 L>R;  Surgeon: Charlie Jacques MD;  Location: [facility] OR;  Service: Pain Management;  Laterality: N/A;    WISDOM TOOTH EXTRACTION         Social History     Socioeconomic History    Marital status: Single   Tobacco Use    Smoking status: Never Smoker    Smokeless tobacco: Never Used   Substance and Sexual Activity    Alcohol use: Yes      "Comment: rarely    Drug use: No    Sexual activity: Yes     Partners: Male     Birth control/protection: Condom, OCP         Medications/Allergies: See med card    Vitals:    08/10/22 1339   BP: 113/71   Pulse: 91   Weight: 102.2 kg (225 lb 3.2 oz)   Height: 5' 3" (1.6 m)   PainSc:   7   PainLoc: Back         Physical exam:  Gen: A and O x3, pleasant, well-groomed  Skin: No rashes or obvious lesions  HEENT: PERRLA, no obvious deformities on ears or in canals.Trachea midline.  CVS: Regular rate and rhythm, normal palpable pulses.  Resp: Clear to auscultation bilaterally, no wheezes or rales.  Abdomen: Soft, NT/ND.  Musculoskeletal: Able to heel walk, toe walk. No antalgic gait.     Neuro:  Upper extremities: 5/5 strength bilaterally   Lower extremities:  5/5 strength bilaterally  Reflexes: Brachioradialis 3+ left, 2+ right, Bicep 3+ left, 2+ right, Tricep 3+ left, 2+ right. Patellar 3+ bilaterally, Achilles 2+ bilaterally  Sensory: Intact and symmetrical to light touch and pinprick in C2-T1 dermatomes bilaterally.    Lumbar spine:  Lumbar spine: ROM is full with flexion extension and oblique extension with mild increased pain in the back with extension.  Oblique extension causes pain in the mid lumbar region on either side.  Regis's test causes increased pain in the groin on either side  Supine straight leg raise is negative bilaterally.    Internal and external rotation of the hip causes no increased pain on either side.  Myofascial exam:  Moderate tenderness to palpation to the upper and mid lumbar paraspinous muscles greater than lower.      Imagin20 MRI C-spine:  Discs: Mild degenerative disc desiccation at C2-3 through C6-7.  No significant disc space narrowing.   Cord: Flattening of the right ventral aspect of the cervical cord at the C6-7 level.  No definite focal cord signal abnormality.   Skull base and craniocervical junction: Normal.   Degenerative findings:   C2-C3: The disc is normal in " configuration. Mild bilateral facet arthropathy.  There is no neural foraminal stenosis.  There is no spinal canal stenosis.   C3-C4: The disc is normal in configuration.  Mild bilateral, left greater than right, facet arthropathy.  There is no neural foraminal stenosis.  There is no spinal canal stenosis.   C4-C5: Minimal posterior disc osteophyte complex.  Mild bilateral, left greater than right, facet arthropathy.  Mild bilateral uncovertebral joint spurring.  There is no neural foraminal stenosis.  There is no spinal canal stenosis.   C5-C6: The disc is normal in configuration.  There is no facet arthropathy.  Mild left uncovertebral joint spurring.  Mild left neural foraminal stenosis.  There is no spinal canal stenosis.   C6-C7: Prominent right disc extrusion with partial extension into the right foramen, which flattens the right ventral cord surface and contributes to mild spinal canal stenosis and moderate right neural foraminal narrowing.  Mild bilateral facet arthropathy.  Mild right uncovertebral joint spurring.   C7-T1: The disc is normal in configuration.  There is no facet arthropathy.  There is no uncovertebral joint disease.  There is no neural foraminal stenosis.  There is no spinal canal stenosis.     11/10/2021 MRI cervical spine  Limited intracranial evaluation demonstrates no large mass effect or fluid collection.  No gross soft tissue defect or significant susceptibility artifact is appreciated. Osseous alignment appears maintained with no diffuse acute abnormal marrow or central cord signal appreciated. There is some straightening of the curvature overall.  This may be positional versus muscular spasm.  There is disc space narrowing with decreased disc water signal similar overall at the C6-7 level. There is multilevel facet, uncovertebral hypertrophy.   C2-3 demonstrates no significant interval spinal canal or neural foraminal narrowing.   C3-4 demonstrates no significant interval spinal  canal or neural foraminal narrowing.   C4-5 demonstrates no significant interval spinal canal or neural foraminal narrowing with some marginal dorsal disc bulging.   C5-6 demonstrates some marginal dorsal disc bulging with minimal lateral recess, inferior neural foraminal narrowing bilaterally.   C6-7 demonstrates some dorsal disc bulging with large protrusion appearance at the right lateral recess, neural foraminal origin indenting the adjacent thecal sac, cord.  There is some faint increased cord signal on the sagittal sequence suggestive early myelomalacia.  This contributes overall to mild left as well as moderate right neural foraminal origin narrowing.   C7-T1 demonstrates no significant interval spinal canal or neural foraminal narrowing.    11/29/2021 MRI lumbar spine  Five lumbar type vertebral bodies.  Straightening the normal lordotic curvature.  The alignment is normal.  The vertebral body heights are maintained without evidence for compression deformity, fracture, or subluxation.  Marrow signal pattern is normal.  Intervertebral disc space narrowing is identified at L4-5.  Disc desiccation is identified at L4-5 and L5-S1.   The conus medullaris is at the level of L1.  The proximal spinal cord is of normal caliber the distal nerve roots are normal.   T12-L1: Ligament flavum hypertrophy with degenerative change of facets.  No evidence for central canal stenosis or neural foraminal narrowing.   L1-L2: No significant abnormality.   L2-L3: Ligament flavum hypertrophy of subtle broad-based disc bulge without central canal stenosis or neural foraminal narrowing.   L3-L4: Subtle broad-based disc bulge with ligament flavum hypertrophy.  Indentation of the thecal sac without central canal stenosis or neural foraminal narrowing.   L4-L5: Broad-based disc bulge central disc component with indentation of the thecal sac.  No evidence for central canal stenosis.  Moderate bilateral neural foraminal narrowing with  possible abutment of the exiting L4 nerve roots.   L5-S1: Broad-based disc bulge with central disc protrusion and increased T2 signal suggestive of annular fissure.  Indentation of the thecal sac without evidence for central canal stenosis.  Moderate to severe bilateral neural foraminal narrowing with abutment of the exiting L5 nerve roots.   The bilateral SI joints are normal.   The visualized hollow and solid viscera within normal limits.    Assessment:   The patient is a 27-year-old woman with a history of narcolepsy, TEA, fibromyalgia, presents in self-referral for neck pain.   1. Lumbar spondylosis  Vital signs    Place 18-22 [facility] IV     Verify informed consent    Notify physician     Notify physician     Notify physician (specify)    Diet NPO    Case Request Operating Room: Block-nerve-medial branch-lumbar L2/3 L3/4    Place in Outpatient    lactated ringers infusion   2. DDD (degenerative disc disease), lumbar     3. DDD (degenerative disc disease), cervical         Plan:  1. The patient had some relief following the lumbar epidural steroid injection and her current worst pain is in the mid and upper lumbar region.  I reviewed her imaging with her and we discussed that she is likely experiencing facet mediated pain.  I am going to schedule her for bilateral L2/3 and L3/4 diagnostic medial branch nerve blocks.  If successful we will proceed with repeating the blocks prior to radiofrequency ablation.  If she does not have relief with this we can repeat an epidural steroid injection.  2. I encouraged her to continue her home exercise program.    3. Follow-up in 4 weeks postprocedure or sooner as needed.

## 2022-08-14 NOTE — H&P (VIEW-ONLY)
This note was completed with dictation software and grammatical errors may exist.    CC:  Neck pain, low back pain    HPI:  The patient is a 27-year-old woman with a history of narcolepsy, TEA, fibromyalgia, presents in self-referral for neck pain.  She is status post L5/S1 interlaminar epidural steroid injection on 05/05/2022 with 50% relief.  She does continue to have some pain across the lower lumbar region, left greater than right but reports worse pain in the mid to upper lumbar region.  This is worse with standing, prolonged sitting and with repetitive bending.  She started a new job working twice a week at [employer] saying she wear a which has been causing some increased pain.  She continues to do her home exercise program.  She also continues to have some upper neck pain radiating to the occipital regions.  She states that her upper extremity weakness has been improving, denies any new weakness.  She denies numbness or incontinence.      Interval history 03/09/2022:  She returns in follow-up today with neck pain and low back pain.  Since her last visit she underwent C6-7 ACDF with Dr. Ball on 12/13/2021.  She has had relief of her left upper extremity symptoms but does continue to still have some left-sided neck pain radiating behind her left ear, along her left collar bone and in her left scapular region.  She reports some weaning in her left scapula and has been working to strengthen this.  Her main complaint today is low back and leg pain.  She reports soreness in the sacrum and tailbone region with sitting as well as cramping across the low back and upper buttocks.  She reports pain occasionally radiating to the groin but more consistently radiating to the lateral hips and lateral thighs.  She reports numbness and tingling in her legs a few times a week when her pain is severe.  She does still have some weakness in her left upper extremity but this has significantly improved following her surgery.   She also reports improved numbness in her left upper extremity.  She denies bladder or bowel incontinence.    Previous history:  Patient reports having many years of neck pain, upper back pain, low back pain.  She has numbness and tingling in her left arm.  She has been diagnosed with fibromyalgia up but they had assessed her for Denzel-Danlos since she has family members who have this as well but apparently she did not meet all the diagnostic criteria.  Nonetheless she continues to have neck pain, worse to the right side in her right trapezius but numbness and tingling down the left arm into the hand.  She describes the pain is aching, shooting, tight, tingling.  Is worse with sitting, standing, bending, coughing or sneezing.  She gets relief with rest.  She has been taking gabapentin 600 twice daily with slight relief, also has had some slight benefit with ibuprofen and Flexeril.  She denies any roma weakness but does report having issues with dropping things several times a week.  She denies any balance issues but did note that in her review of systems.    Pain intervention history:  She returns in follow-up today, she is status post cervical JOSUE on 10/11/2021 with 25% relief of the neck pain. She returns in follow-up today, status post L5/S1 JOSUE on 03/24/2022 with 100% relief of her radicular leg pain at times, 50% relief of her back pain overall.   She is status post L5/S1 interlaminar epidural steroid injection on 05/05/2022 with 50% relief.     Spine surgeries:  C6-7 ACDF with Dr. Ball on 12/13/2021.     Antineuropathics:  Gabapentin 600 twice daily with moderate benefit  NSAIDs:  Taking ibuprofen with slight relief.  Physical therapy:  Currently in physical therapy for her neck in spring of 2022 doing well   Antidepressants:  Duloxetine 40  Muscle relaxers:  Flexeril slight benefit  Opioids:  Antiplatelets/Anticoagulants:    ROS:  She reports fatigue, headaches, shortness of breath, stomach pain, urinary  urgency and frequency, back pain, memory loss, dizziness, anxiety and loss of balance.  Balance of review of systems is negative.    Lab Results   Component Value Date    HGBA1C 5.5 12/09/2021       Lab Results   Component Value Date    WBC 6.37 12/09/2021    HGB 12.9 12/09/2021    HCT 39.6 12/09/2021    MCV 88 12/09/2021     12/09/2021             Past Medical History:   Diagnosis Date    Anxiety     Asthma     Cervical radiculopathy 12/2021    Depression     Fibromyalgia     GERD (gastroesophageal reflux disease)     Headache(784.0)     Migraine     Narcolepsy     Obesity, Class II, BMI 35-39.9, no comorbidity 5/7/2016    Optic nerve edema     Sleep apnea     no cpap    Urinary tract infection     Vision abnormalities     glasses       Past Surgical History:   Procedure Laterality Date    ANTERIOR CERVICAL DISCECTOMY W/ FUSION N/A 12/13/2021    Procedure: DISCECTOMY, SPINE, CERVICAL, ANTERIOR APPROACH, WITH FUSION, C6-7;  Surgeon: Deven Ball MD;  Location: [facility];  Service: Neurosurgery;  Laterality: N/A;    EPIDURAL STEROID INJECTION INTO CERVICAL SPINE N/A 10/11/2021    Procedure: Injection-steroid-epidural-cervical;  Surgeon: Charlie Jacques MD;  Location: [facility] OR;  Service: Pain Management;  Laterality: N/A;    EPIDURAL STEROID INJECTION INTO LUMBAR SPINE N/A 3/24/2022    Procedure: Injection-steroid-epidural-lumbar L5/S1;  Surgeon: Charlie Jacques MD;  Location: [facility] OR;  Service: Pain Management;  Laterality: N/A;    EPIDURAL STEROID INJECTION INTO LUMBAR SPINE N/A 5/5/2022    Procedure: Injection-steroid-epidural-lumbar L5/S1 L>R;  Surgeon: Charlie Jacques MD;  Location: [facility] OR;  Service: Pain Management;  Laterality: N/A;    WISDOM TOOTH EXTRACTION         Social History     Socioeconomic History    Marital status: Single   Tobacco Use    Smoking status: Never Smoker    Smokeless tobacco: Never Used   Substance and Sexual Activity    Alcohol use: Yes      "Comment: rarely    Drug use: No    Sexual activity: Yes     Partners: Male     Birth control/protection: Condom, OCP         Medications/Allergies: See med card    Vitals:    08/10/22 1339   BP: 113/71   Pulse: 91   Weight: 102.2 kg (225 lb 3.2 oz)   Height: 5' 3" (1.6 m)   PainSc:   7   PainLoc: Back         Physical exam:  Gen: A and O x3, pleasant, well-groomed  Skin: No rashes or obvious lesions  HEENT: PERRLA, no obvious deformities on ears or in canals.Trachea midline.  CVS: Regular rate and rhythm, normal palpable pulses.  Resp: Clear to auscultation bilaterally, no wheezes or rales.  Abdomen: Soft, NT/ND.  Musculoskeletal: Able to heel walk, toe walk. No antalgic gait.     Neuro:  Upper extremities: 5/5 strength bilaterally   Lower extremities:  5/5 strength bilaterally  Reflexes: Brachioradialis 3+ left, 2+ right, Bicep 3+ left, 2+ right, Tricep 3+ left, 2+ right. Patellar 3+ bilaterally, Achilles 2+ bilaterally  Sensory: Intact and symmetrical to light touch and pinprick in C2-T1 dermatomes bilaterally.    Lumbar spine:  Lumbar spine: ROM is full with flexion extension and oblique extension with mild increased pain in the back with extension.  Oblique extension causes pain in the mid lumbar region on either side.  Regis's test causes increased pain in the groin on either side  Supine straight leg raise is negative bilaterally.    Internal and external rotation of the hip causes no increased pain on either side.  Myofascial exam:  Moderate tenderness to palpation to the upper and mid lumbar paraspinous muscles greater than lower.      Imagin20 MRI C-spine:  Discs: Mild degenerative disc desiccation at C2-3 through C6-7.  No significant disc space narrowing.   Cord: Flattening of the right ventral aspect of the cervical cord at the C6-7 level.  No definite focal cord signal abnormality.   Skull base and craniocervical junction: Normal.   Degenerative findings:   C2-C3: The disc is normal in " configuration. Mild bilateral facet arthropathy.  There is no neural foraminal stenosis.  There is no spinal canal stenosis.   C3-C4: The disc is normal in configuration.  Mild bilateral, left greater than right, facet arthropathy.  There is no neural foraminal stenosis.  There is no spinal canal stenosis.   C4-C5: Minimal posterior disc osteophyte complex.  Mild bilateral, left greater than right, facet arthropathy.  Mild bilateral uncovertebral joint spurring.  There is no neural foraminal stenosis.  There is no spinal canal stenosis.   C5-C6: The disc is normal in configuration.  There is no facet arthropathy.  Mild left uncovertebral joint spurring.  Mild left neural foraminal stenosis.  There is no spinal canal stenosis.   C6-C7: Prominent right disc extrusion with partial extension into the right foramen, which flattens the right ventral cord surface and contributes to mild spinal canal stenosis and moderate right neural foraminal narrowing.  Mild bilateral facet arthropathy.  Mild right uncovertebral joint spurring.   C7-T1: The disc is normal in configuration.  There is no facet arthropathy.  There is no uncovertebral joint disease.  There is no neural foraminal stenosis.  There is no spinal canal stenosis.     11/10/2021 MRI cervical spine  Limited intracranial evaluation demonstrates no large mass effect or fluid collection.  No gross soft tissue defect or significant susceptibility artifact is appreciated. Osseous alignment appears maintained with no diffuse acute abnormal marrow or central cord signal appreciated. There is some straightening of the curvature overall.  This may be positional versus muscular spasm.  There is disc space narrowing with decreased disc water signal similar overall at the C6-7 level. There is multilevel facet, uncovertebral hypertrophy.   C2-3 demonstrates no significant interval spinal canal or neural foraminal narrowing.   C3-4 demonstrates no significant interval spinal  canal or neural foraminal narrowing.   C4-5 demonstrates no significant interval spinal canal or neural foraminal narrowing with some marginal dorsal disc bulging.   C5-6 demonstrates some marginal dorsal disc bulging with minimal lateral recess, inferior neural foraminal narrowing bilaterally.   C6-7 demonstrates some dorsal disc bulging with large protrusion appearance at the right lateral recess, neural foraminal origin indenting the adjacent thecal sac, cord.  There is some faint increased cord signal on the sagittal sequence suggestive early myelomalacia.  This contributes overall to mild left as well as moderate right neural foraminal origin narrowing.   C7-T1 demonstrates no significant interval spinal canal or neural foraminal narrowing.    11/29/2021 MRI lumbar spine  Five lumbar type vertebral bodies.  Straightening the normal lordotic curvature.  The alignment is normal.  The vertebral body heights are maintained without evidence for compression deformity, fracture, or subluxation.  Marrow signal pattern is normal.  Intervertebral disc space narrowing is identified at L4-5.  Disc desiccation is identified at L4-5 and L5-S1.   The conus medullaris is at the level of L1.  The proximal spinal cord is of normal caliber the distal nerve roots are normal.   T12-L1: Ligament flavum hypertrophy with degenerative change of facets.  No evidence for central canal stenosis or neural foraminal narrowing.   L1-L2: No significant abnormality.   L2-L3: Ligament flavum hypertrophy of subtle broad-based disc bulge without central canal stenosis or neural foraminal narrowing.   L3-L4: Subtle broad-based disc bulge with ligament flavum hypertrophy.  Indentation of the thecal sac without central canal stenosis or neural foraminal narrowing.   L4-L5: Broad-based disc bulge central disc component with indentation of the thecal sac.  No evidence for central canal stenosis.  Moderate bilateral neural foraminal narrowing with  possible abutment of the exiting L4 nerve roots.   L5-S1: Broad-based disc bulge with central disc protrusion and increased T2 signal suggestive of annular fissure.  Indentation of the thecal sac without evidence for central canal stenosis.  Moderate to severe bilateral neural foraminal narrowing with abutment of the exiting L5 nerve roots.   The bilateral SI joints are normal.   The visualized hollow and solid viscera within normal limits.    Assessment:   The patient is a 27-year-old woman with a history of narcolepsy, TEA, fibromyalgia, presents in self-referral for neck pain.   1. Lumbar spondylosis  Vital signs    Place 18-22 [facility] IV     Verify informed consent    Notify physician     Notify physician     Notify physician (specify)    Diet NPO    Case Request Operating Room: Block-nerve-medial branch-lumbar L2/3 L3/4    Place in Outpatient    lactated ringers infusion   2. DDD (degenerative disc disease), lumbar     3. DDD (degenerative disc disease), cervical         Plan:  1. The patient had some relief following the lumbar epidural steroid injection and her current worst pain is in the mid and upper lumbar region.  I reviewed her imaging with her and we discussed that she is likely experiencing facet mediated pain.  I am going to schedule her for bilateral L2/3 and L3/4 diagnostic medial branch nerve blocks.  If successful we will proceed with repeating the blocks prior to radiofrequency ablation.  If she does not have relief with this we can repeat an epidural steroid injection.  2. I encouraged her to continue her home exercise program.    3. Follow-up in 4 weeks postprocedure or sooner as needed.

## 2022-08-21 DIAGNOSIS — R10.9 ABDOMINAL PAIN, UNSPECIFIED ABDOMINAL LOCATION: ICD-10-CM

## 2022-08-21 RX ORDER — PANTOPRAZOLE SODIUM 40 MG/1
TABLET, DELAYED RELEASE ORAL
Qty: 30 TABLET | Refills: 3 | Status: SHIPPED | OUTPATIENT
Start: 2022-08-21 | End: 2022-12-16

## 2022-08-25 ENCOUNTER — HOSPITAL ENCOUNTER (OUTPATIENT)
Facility: HOSPITAL | Age: 27
Discharge: HOME | End: 2022-08-25
Attending: ANESTHESIOLOGY | Admitting: ANESTHESIOLOGY
Payer: COMMERCIAL

## 2022-08-25 ENCOUNTER — HOSPITAL ENCOUNTER (OUTPATIENT)
Dept: RADIOLOGY | Facility: HOSPITAL | Age: 27
Discharge: HOME | End: 2022-08-25
Attending: ANESTHESIOLOGY
Payer: COMMERCIAL

## 2022-08-25 VITALS
RESPIRATION RATE: 16 BRPM | OXYGEN SATURATION: 99 % | SYSTOLIC BLOOD PRESSURE: 109 MMHG | TEMPERATURE: 98 F | HEART RATE: 90 BPM | DIASTOLIC BLOOD PRESSURE: 60 MMHG

## 2022-08-25 DIAGNOSIS — M51.36 DDD (DEGENERATIVE DISC DISEASE), LUMBAR: ICD-10-CM

## 2022-08-25 DIAGNOSIS — M47.816 LUMBAR SPONDYLOSIS: ICD-10-CM

## 2022-08-25 LAB
B-HCG UR QL: NEGATIVE
CTP QC/QA: YES

## 2022-08-25 PROCEDURE — 76000 FLUOROSCOPY <1 HR PHYS/QHP: CPT | Mod: TC,PO

## 2022-08-25 PROCEDURE — 64493 INJ PARAVERT F JNT L/S 1 LEV: CPT | Mod: 50,KX,, | Performed by: ANESTHESIOLOGY

## 2022-08-25 PROCEDURE — 25000003 PHARM REV CODE 250: Mod: PO | Performed by: ANESTHESIOLOGY

## 2022-08-25 PROCEDURE — 81025 URINE PREGNANCY TEST: CPT | Mod: PO | Performed by: ANESTHESIOLOGY

## 2022-08-25 PROCEDURE — 64494 INJ PARAVERT F JNT L/S 2 LEV: CPT | Mod: 50,PO | Performed by: ANESTHESIOLOGY

## 2022-08-25 PROCEDURE — 64494 INJ PARAVERT F JNT L/S 2 LEV: CPT | Mod: 50,KX,, | Performed by: ANESTHESIOLOGY

## 2022-08-25 PROCEDURE — 64493 INJ PARAVERT F JNT L/S 1 LEV: CPT | Mod: 50,PO | Performed by: ANESTHESIOLOGY

## 2022-08-25 PROCEDURE — 64494 PR INJ DX/THER AGNT PARAVERT FACET JOINT,IMG GUIDE,LUMBAR/SAC, 2ND LEVEL: ICD-10-PCS | Mod: 50,KX,, | Performed by: ANESTHESIOLOGY

## 2022-08-25 PROCEDURE — 63600175 PHARM REV CODE 636 W HCPCS: Mod: PO | Performed by: ANESTHESIOLOGY

## 2022-08-25 PROCEDURE — 64493 PR INJ DX/THER AGNT PARAVERT FACET JOINT,IMG GUIDE,LUMBAR/SAC,1ST LVL: ICD-10-PCS | Mod: 50,KX,, | Performed by: ANESTHESIOLOGY

## 2022-08-25 RX ORDER — LIDOCAINE HYDROCHLORIDE 10 MG/ML
INJECTION, SOLUTION EPIDURAL; INFILTRATION; INTRACAUDAL; PERINEURAL
Status: DISCONTINUED | OUTPATIENT
Start: 2022-08-25 | End: 2022-08-25 | Stop reason: HOSPADM

## 2022-08-25 RX ORDER — BUPIVACAINE HYDROCHLORIDE 2.5 MG/ML
INJECTION, SOLUTION EPIDURAL; INFILTRATION; INTRACAUDAL
Status: DISCONTINUED | OUTPATIENT
Start: 2022-08-25 | End: 2022-08-25 | Stop reason: HOSPADM

## 2022-08-25 RX ORDER — MIDAZOLAM HYDROCHLORIDE 2 MG/2ML
INJECTION, SOLUTION INTRAMUSCULAR; INTRAVENOUS
Status: DISCONTINUED | OUTPATIENT
Start: 2022-08-25 | End: 2022-08-25 | Stop reason: HOSPADM

## 2022-08-25 RX ORDER — SODIUM CHLORIDE, SODIUM LACTATE, POTASSIUM CHLORIDE, CALCIUM CHLORIDE 600; 310; 30; 20 MG/100ML; MG/100ML; MG/100ML; MG/100ML
INJECTION, SOLUTION INTRAVENOUS CONTINUOUS
Status: DISCONTINUED | OUTPATIENT
Start: 2022-08-25 | End: 2022-08-25 | Stop reason: HOSPADM

## 2022-08-25 RX ADMIN — SODIUM CHLORIDE, SODIUM LACTATE, POTASSIUM CHLORIDE, AND CALCIUM CHLORIDE: .6; .31; .03; .02 INJECTION, SOLUTION INTRAVENOUS at 01:08

## 2022-08-25 NOTE — OP NOTE
PROCEDURE DATE: 8/25/2022    PROCEDURE:  Diagnostic bilateral L2/3 and L3/4 medial branch nerve block     DIAGNOSIS:  Lumbar spondylosis    Post Op diagnosis: Same    PHYSICIAN: Charlie Jacques MD    MEDICATIONS INJECTED: 0.25% bupivicaine, 1ml at each level    LOCAL ANESTHETIC USED: Lidocaine 1%, 2ml at each level    SEDATION MEDICATIONS:2mg versed    ESTIMATED BLOOD LOSS:  none    COMPLICATIONS:  none    TECHNIQUE: A time out was taken to identify the patient, procedure and side of the procedure. The patient was placed in a prone position, then prepped and draped in the usual sterile fashion using ChloraPrep and sterile towels.  The levels were determined under fluoroscopic guidance and then marked.  Local anesthetic was given by raising a wheal at the skin over each site and then infiltrated approximately 2cm deeper.  A 25-gauge 3.5 inch needle was introduced to the anatomic location of the right and then left L2/3 and L3/4 medial branch nerves on the bilateral side.  Appropriate location and medication spread confirmed by injecting 0.5ml of Omnipaque. The above medication was then injected. The patient tolerated the procedure well.     The patient was monitored after the procedure. The patient will be contacted in the next few days to determine extent of relief.  Patient was given post procedure and discharge instructions to follow at home.  The patient was discharged in a stable condition.

## 2022-08-25 NOTE — DISCHARGE INSTRUCTIONS
PAIN MANAGEMENT    HOME CARE INSTRUCTIONS   Do not use heat (such as a heating pad) for 24 hours.  You may apply an ice pack to the injection site for 20 minutes at a time for the first 24 hours for soreness/discomfort at injection site   Keep site clean and dry for 24 hours. If bandaid is present, remove when desired.   Do not drive until tomorrow.  Take care when walking after a lumbar injection.   Resume home medication as prescribed today.  Resume Aspirin, Plavix, or Coumadin the day after the procedure unless other wise instructed.          BLOCKS  Resume regular activities today.  Pain office will call in next 2 days.      CALL PHYSICIAN FOR:  Severe increase in your usual pain or the appearance of new pain.  Prolonged or increasing weakness or numbness in the legs or arms.  Fever greater than 100 degrees F.  Drainage, redness, active bleeding, or increased swelling at the injection site.  Headache that increases when your head is upright and decreases when you lie flat.    FOR EMERGENCIES:   Go directly to the emergency department for any shortness of breath, chest pain, or problems breathing.

## 2022-08-25 NOTE — DISCHARGE SUMMARY
[facility] - Surgery  Discharge Note  Short Stay    Procedure(s) (LRB):  Block-nerve-medial branch-lumbar L2/3 L3/4 (Bilateral)    OUTCOME: Patient tolerated treatment/procedure well without complication and is now ready for discharge.    DISPOSITION: Home or Self Care    FINAL DIAGNOSIS:  Lumbar spondylosis    FOLLOWUP: In clinic    DISCHARGE INSTRUCTIONS:    Discharge Procedure Orders   Diet Adult Regular     No dressing needed     Notify your health care provider if you experience any of the following:  temperature >100.4     Activity as tolerated

## 2022-08-30 ENCOUNTER — PATIENT MESSAGE (OUTPATIENT)
Dept: PAIN MEDICINE | Facility: CLINIC | Age: 27
End: 2022-08-30
Payer: COMMERCIAL

## 2022-08-30 NOTE — TELEPHONE ENCOUNTER
Call placed to Pt to review results from Chris, Lumbar MBB, L2/3, L3/4 performed by Dr. Jacques to ask:    1. What percentage of pain relief did you receive following the block, from 0-100%? What was pain score from 0-10?    2. How many hours did pain relief last following the block?      3. During this time please describe in detail the activities you were able to do?    No answer. V/m left, Pt portal message sent.

## 2022-09-12 ENCOUNTER — TELEPHONE (OUTPATIENT)
Dept: PAIN MEDICINE | Facility: CLINIC | Age: 27
End: 2022-09-12
Payer: COMMERCIAL

## 2022-09-12 ENCOUNTER — TELEPHONE (OUTPATIENT)
Dept: FAMILY MEDICINE | Facility: CLINIC | Age: 27
End: 2022-09-12
Payer: COMMERCIAL

## 2022-09-12 NOTE — TELEPHONE ENCOUNTER
Return call placed to Pt to review results from Chris, Lumbar MBB, L2/3, L3/4 performed by Dr. Jacques to ask:     1. What percentage of pain relief did you receive following the block, from 0-100%? What was pain score from 0-10?     2. How many hours did pain relief last following the block?       3. During this time please describe in detail the activities you were able to do?    No answer. V/M left.

## 2022-09-12 NOTE — TELEPHONE ENCOUNTER
----- Message from Coral Zamarripa sent at 9/12/2022  2:32 PM CDT -----  Contact: [phone]  Type: Needs Medical Advice  Who Called:  Pt     Best Call Back Number: [phone]    Additional Information: Pt is calling to let off know never block is working and she would like to schedule the second one.          Avoid touching your eye.  Be sure to wash her hands right away after touching your eye.    Follow-up with your eye doctor.  You should see your eye doctor prior to beginning contact lens use.  Do not use contact lenses until you have been cleared to use them by your eye doctor.    Patient Education     Conjunctivitis Caused by Infection     Wash hands often to help prevent spreading infection.     Infections are caused by viruses or germs (bacteria). Treatment includes keeping your eyes and hands clean. Your healthcare provider may prescribe eye drops, and tell you to stay home from work or school if you’re contagious. Untreated infections can be serious. It's important to see your provider for a diagnosis.  Viral infections  A cold, flu, or other virus can spread to your eyes. This causes a watery discharge. Your eyes may burn or itch and get red. Your eyelids may also be puffy and sore.  Treatment  Most viral infections go away on their own. Artificial tears and warm compresses can relieve symptoms. Your healthcare provider may also prescribe eye drops. A viral infection can be very contagious and spread quickly. To prevent this, wash your hands often. Use a separate tissue to wipe each eye. Don’t touch your eyes or share bedding or towels. Use a new, clean washcloth every day. Throw away eye cosmetics, especially mascara. Never use someone else's eye cosmetics. If you use contact lenses, follow your healthcare provider's instructions on proper lens care.   Bacterial infections  Bacterial infections often happen in one eye. There may be a watery or a thick discharge from the eye. These infections can cause serious damage to your eye if not treated promptly.  Treatment  Your healthcare provider may prescribe eye drops or ointment to kill the bacteria. Use the medicine for the number of days it is prescribed. Don't stop using it when the symptoms improve. Warm compresses can help keep the eyelids clean. To keep the  bacteria from spreading, wash your hands often. Use a separate tissue to wipe each eye. Don't touch your eyes or share bedding or towels. Use a new, clean washcloth every day. Throw away eye cosmetics, especially mascara. Never use someone else's eye cosmetics. If you use contact lenses, follow your healthcare provider's instructions on proper lens care.   Date Last Reviewed: 10/1/2017  © 8980-5649 [employer]. [address] [place] [address]. All rights reserved. This information is not intended as a substitute for professional medical care. Always follow your healthcare professional's instructions.

## 2022-09-12 NOTE — TELEPHONE ENCOUNTER
----- Message from Abel Stark sent at 9/12/2022 11:39 AM CDT -----  Contact: Pt  Type: Needs Medical Advice    Who Called:Pt  Best Call Back Number:[phone]    Additional Information Requesting a call back regarding Pt was calling to speak with nurse pt stated they were bitten on the finger by a possum and wanted to know what could be done pt stated they wash the area where they were bitten just wanted to get more information please call Thank you  Please Advise-Thank you        
LM for return call to clinic   
High risk for assault

## 2022-12-09 ENCOUNTER — LAB VISIT (OUTPATIENT)
Dept: LAB | Facility: HOSPITAL | Age: 27
End: 2022-12-09
Payer: COMMERCIAL

## 2022-12-09 ENCOUNTER — OFFICE VISIT (OUTPATIENT)
Dept: GASTROENTEROLOGY | Facility: CLINIC | Age: 27
End: 2022-12-09
Payer: COMMERCIAL

## 2022-12-09 VITALS — HEIGHT: 63 IN | BODY MASS INDEX: 40.31 KG/M2 | WEIGHT: 227.5 LBS

## 2022-12-09 DIAGNOSIS — R13.14 PHARYNGOESOPHAGEAL DYSPHAGIA: ICD-10-CM

## 2022-12-09 DIAGNOSIS — R11.0 NAUSEA: ICD-10-CM

## 2022-12-09 DIAGNOSIS — K59.00 CONSTIPATION, UNSPECIFIED CONSTIPATION TYPE: ICD-10-CM

## 2022-12-09 DIAGNOSIS — R12 HEARTBURN: ICD-10-CM

## 2022-12-09 DIAGNOSIS — R10.13 EPIGASTRIC PAIN: ICD-10-CM

## 2022-12-09 DIAGNOSIS — R10.12 LUQ PAIN: ICD-10-CM

## 2022-12-09 DIAGNOSIS — Z87.898 HISTORY OF SHORTNESS OF BREATH: ICD-10-CM

## 2022-12-09 DIAGNOSIS — R10.13 EPIGASTRIC PAIN: Primary | ICD-10-CM

## 2022-12-09 LAB
ALBUMIN SERPL BCP-MCNC: 3.6 G/DL (ref 3.5–5.2)
ALP SERPL-CCNC: 55 U/L (ref 55–135)
ALT SERPL W/O P-5'-P-CCNC: 15 U/L (ref 10–44)
AST SERPL-CCNC: 15 U/L (ref 10–40)
BILIRUB DIRECT SERPL-MCNC: 0.1 MG/DL (ref 0.1–0.3)
BILIRUB SERPL-MCNC: 0.3 MG/DL (ref 0.1–1)
LIPASE SERPL-CCNC: 16 U/L (ref 4–60)
PROT SERPL-MCNC: 7 G/DL (ref 6–8.4)
TSH SERPL DL<=0.005 MIU/L-ACNC: 1.08 UIU/ML (ref 0.4–4)

## 2022-12-09 PROCEDURE — 1159F PR MEDICATION LIST DOCUMENTED IN MEDICAL RECORD: ICD-10-PCS | Mod: CPTII,S$GLB,, | Performed by: NURSE PRACTITIONER

## 2022-12-09 PROCEDURE — 83690 ASSAY OF LIPASE: CPT | Performed by: NURSE PRACTITIONER

## 2022-12-09 PROCEDURE — 99214 PR OFFICE/OUTPT VISIT, EST, LEVL IV, 30-39 MIN: ICD-10-PCS | Mod: S$GLB,,, | Performed by: NURSE PRACTITIONER

## 2022-12-09 PROCEDURE — 1160F RVW MEDS BY RX/DR IN RCRD: CPT | Mod: CPTII,S$GLB,, | Performed by: NURSE PRACTITIONER

## 2022-12-09 PROCEDURE — 99999 PR PBB SHADOW E&M-EST. PATIENT-LVL IV: ICD-10-PCS | Mod: PBBFAC,,, | Performed by: NURSE PRACTITIONER

## 2022-12-09 PROCEDURE — 1159F MED LIST DOCD IN RCRD: CPT | Mod: CPTII,S$GLB,, | Performed by: NURSE PRACTITIONER

## 2022-12-09 PROCEDURE — 80076 HEPATIC FUNCTION PANEL: CPT | Performed by: NURSE PRACTITIONER

## 2022-12-09 PROCEDURE — 3008F BODY MASS INDEX DOCD: CPT | Mod: CPTII,S$GLB,, | Performed by: NURSE PRACTITIONER

## 2022-12-09 PROCEDURE — 99999 PR PBB SHADOW E&M-EST. PATIENT-LVL IV: CPT | Mod: PBBFAC,,, | Performed by: NURSE PRACTITIONER

## 2022-12-09 PROCEDURE — 84443 ASSAY THYROID STIM HORMONE: CPT | Performed by: NURSE PRACTITIONER

## 2022-12-09 PROCEDURE — 99214 OFFICE O/P EST MOD 30 MIN: CPT | Mod: S$GLB,,, | Performed by: NURSE PRACTITIONER

## 2022-12-09 PROCEDURE — 85027 COMPLETE CBC AUTOMATED: CPT | Performed by: NURSE PRACTITIONER

## 2022-12-09 PROCEDURE — 36415 COLL VENOUS BLD VENIPUNCTURE: CPT | Mod: PO | Performed by: NURSE PRACTITIONER

## 2022-12-09 PROCEDURE — 3008F PR BODY MASS INDEX (BMI) DOCUMENTED: ICD-10-PCS | Mod: CPTII,S$GLB,, | Performed by: NURSE PRACTITIONER

## 2022-12-09 PROCEDURE — 1160F PR REVIEW ALL MEDS BY PRESCRIBER/CLIN PHARMACIST DOCUMENTED: ICD-10-PCS | Mod: CPTII,S$GLB,, | Performed by: NURSE PRACTITIONER

## 2022-12-09 RX ORDER — LISDEXAMFETAMINE DIMESYLATE 40 MG/1
40 CAPSULE ORAL DAILY
COMMUNITY

## 2022-12-09 NOTE — PROGRESS NOTES
Subjective:       Patient ID: Renetta Durham is a 27 y.o. female Body mass index is 40.3 kg/m².    Chief Complaint: Abdominal Pain and Nausea    This patient is new to me.     GI Problem  The primary symptoms include abdominal pain and nausea. Primary symptoms do not include fever, weight loss, fatigue, vomiting, diarrhea, melena, hematemesis, hematochezia or dysuria.   The abdominal pain began more than 2 days ago (started in childhood). The abdominal pain has been gradually worsening (over the past years) since its onset. The abdominal pain is located in the epigastric region and LUQ (described as hunger pain, fullness, cramping; occurs every few days, lasts for a couple of hours). The abdominal pain does not radiate. The severity of the abdominal pain is 4/10 (currently). Relieved by: worse with spicy foods, alcohol.   The illness is also significant for dysphagia (chronic for several years, occurs with food & liquids; gets choked; denies problems with pills), constipation (started a year, bowel movements are once every 2 days) and tenesmus. The illness does not include chills or odynophagia. Significant associated medical issues include GERD (started several years ago,occasional if she eats spicy foods which she tries to avoid, otherwise controlled on protonix;  has been taking protonix 40 mg once daily for a long time).     Review of Systems   Constitutional:  Negative for appetite change, chills, fatigue, fever and weight loss.   HENT:  Positive for trouble swallowing. Negative for sore throat.    Respiratory:  Positive for choking (occasional, denies needing heimlich maneuver or having to seek medical assistance to relief it) and shortness of breath (chronic for a couple of years; intermittent, has had heart workup; denies currently). Negative for cough.    Cardiovascular:  Negative for chest pain.   Gastrointestinal:  Positive for abdominal pain, constipation (started a year, bowel movements are once  every 2 days), dysphagia (chronic for several years, occurs with food & liquids; gets choked; denies problems with pills) and nausea. Negative for anal bleeding, blood in stool, diarrhea, hematemesis, hematochezia, melena, rectal pain and vomiting.   Genitourinary:  Negative for difficulty urinating, dysuria and flank pain.   Neurological:  Negative for weakness.       Patient's last menstrual period was 12/07/2022.  Past Medical History:   Diagnosis Date    Anxiety     Asthma     Cervical radiculopathy 12/2021    Depression     Fibromyalgia     GERD (gastroesophageal reflux disease)     Headache(784.0)     Migraine     Narcolepsy     Obesity, Class II, BMI 35-39.9, no comorbidity 5/7/2016    Optic nerve edema     Sleep apnea     no cpap    Urinary tract infection     Vision abnormalities     glasses     Past Surgical History:   Procedure Laterality Date    ANTERIOR CERVICAL DISCECTOMY W/ FUSION N/A 12/13/2021    Procedure: DISCECTOMY, SPINE, CERVICAL, ANTERIOR APPROACH, WITH FUSION, C6-7;  Surgeon: Deven Ball MD;  Location: [facility];  Service: Neurosurgery;  Laterality: N/A;    EPIDURAL STEROID INJECTION INTO CERVICAL SPINE N/A 10/11/2021    Procedure: Injection-steroid-epidural-cervical;  Surgeon: Charlie Jacques MD;  Location: [facility] OR;  Service: Pain Management;  Laterality: N/A;    EPIDURAL STEROID INJECTION INTO LUMBAR SPINE N/A 03/24/2022    Procedure: Injection-steroid-epidural-lumbar L5/S1;  Surgeon: Charlie Jacques MD;  Location: [facility] OR;  Service: Pain Management;  Laterality: N/A;    EPIDURAL STEROID INJECTION INTO LUMBAR SPINE N/A 05/05/2022    Procedure: Injection-steroid-epidural-lumbar L5/S1 L>R;  Surgeon: Charlie Jacques MD;  Location: [facility] OR;  Service: Pain Management;  Laterality: N/A;    INJECTION OF ANESTHETIC AGENT AROUND MEDIAL BRANCH NERVES INNERVATING LUMBAR FACET JOINT Bilateral 08/25/2022    Procedure: Block-nerve-medial branch-lumbar L2/3 L3/4;  Surgeon: Charlie Jacques  MD;  Location: [facility] OR;  Service: Pain Management;  Laterality: Bilateral;    WISDOM TOOTH EXTRACTION       Family History   Problem Relation Age of Onset    Arthritis Mother     Ulcerative colitis Father     Other Father         recurrent staff infections    Asthma Sister         half sister    Miscarriages / Stillbirths Sister         half sister    Vision loss Maternal Aunt     Cancer Maternal Aunt     Breast cancer Maternal Aunt     Arthritis Maternal Grandmother     Cataracts Maternal Grandmother     Colon cancer Maternal Grandfather     Cancer Maternal Grandfather         colon, liver, lungs    Diabetes Paternal Grandmother     Heart disease Paternal Grandmother     Hypertension Paternal Grandmother     COPD Paternal Grandfather     Mental illness Other     Amblyopia Neg Hx     Blindness Neg Hx     Glaucoma Neg Hx     Macular degeneration Neg Hx     Retinal detachment Neg Hx     Strabismus Neg Hx     Stroke Neg Hx     Thyroid disease Neg Hx     Ovarian cancer Neg Hx     Crohn's disease Neg Hx     Stomach cancer Neg Hx     Esophageal cancer Neg Hx      Social History     Tobacco Use    Smoking status: Never    Smokeless tobacco: Never   Substance Use Topics    Alcohol use: Yes     Comment: rarely    Drug use: No     Wt Readings from Last 10 Encounters:   12/09/22 103.2 kg (227 lb 8.2 oz)   08/10/22 102.2 kg (225 lb 3.2 oz)   05/18/22 102.2 kg (225 lb 5 oz)   05/06/22 102.1 kg (225 lb 1.4 oz)   04/28/22 102.8 kg (226 lb 10.1 oz)   04/13/22 101.6 kg (224 lb)   03/09/22 101.8 kg (224 lb 5.1 oz)   02/16/22 98.9 kg (218 lb)   01/26/22 98.9 kg (218 lb 0.6 oz)   01/01/22 98.9 kg (218 lb)     Lab Results   Component Value Date    WBC 8.50 12/09/2022    HGB 13.1 12/09/2022    HCT 41.6 12/09/2022    MCV 90 12/09/2022     12/09/2022     CMP  Sodium   Date Value Ref Range Status   12/09/2021 138 136 - 145 mmol/L Final     Potassium   Date Value Ref Range Status   12/09/2021 4.3 3.5 - 5.1 mmol/L Final     Chloride    Date Value Ref Range Status   12/09/2021 105 95 - 110 mmol/L Final     CO2   Date Value Ref Range Status   12/09/2021 28 22 - 31 mmol/L Final     Glucose   Date Value Ref Range Status   12/09/2021 96 70 - 110 mg/dL Final     Comment:     The ADA recommends the following guidelines for fasting glucose:    Normal:       less than 100 mg/dL    Prediabetes:  100 mg/dL to 125 mg/dL    Diabetes:     126 mg/dL or higher       BUN   Date Value Ref Range Status   12/09/2021 10 7 - 18 mg/dL Final     Creatinine   Date Value Ref Range Status   12/09/2021 0.74 0.50 - 1.40 mg/dL Final     Calcium   Date Value Ref Range Status   12/09/2021 9.4 8.4 - 10.2 mg/dL Final     Total Protein   Date Value Ref Range Status   12/09/2022 7.0 6.0 - 8.4 g/dL Final     Albumin   Date Value Ref Range Status   12/09/2022 3.6 3.5 - 5.2 g/dL Final     Total Bilirubin   Date Value Ref Range Status   12/09/2022 0.3 0.1 - 1.0 mg/dL Final     Comment:     For infants and newborns, interpretation of results should be based  on gestational age, weight and in agreement with clinical  observations.    Premature Infant recommended reference ranges:  Up to 24 hours.............<8.0 mg/dL  Up to 48 hours............<12.0 mg/dL  3-5 days..................<15.0 mg/dL  6-29 days.................<15.0 mg/dL       Alkaline Phosphatase   Date Value Ref Range Status   12/09/2022 55 55 - 135 U/L Final     AST   Date Value Ref Range Status   12/09/2022 15 10 - 40 U/L Final     ALT   Date Value Ref Range Status   12/09/2022 15 10 - 44 U/L Final     Anion Gap   Date Value Ref Range Status   12/09/2021 5 (L) 8 - 16 mmol/L Final     eGFR if    Date Value Ref Range Status   12/09/2021 >60 >60 mL/min/1.73 m^2 Final     eGFR if non    Date Value Ref Range Status   12/09/2021 >60 >60 mL/min/1.73 m^2 Final     Comment:     Calculation used to obtain the estimated glomerular filtration  rate (eGFR) is the CKD-EPI equation.        Lab Results    Component Value Date    LIPASERES 34 12/11/2019     Lab Results   Component Value Date    TSH 1.081 12/09/2022     Reviewed prior medical records including radiology report of 8/20/2014 limited abdominal ultrasound; 8/20/2014 pelvic ultrasound; & 5/31/2012 esophagram.    Objective:      Physical Exam  Vitals and nursing note reviewed.   Constitutional:       General: She is not in acute distress.     Appearance: Normal appearance. She is well-developed. She is not diaphoretic.   HENT:      Mouth/Throat:      Lips: Pink. No lesions.      Mouth: Mucous membranes are moist. No oral lesions.      Tongue: No lesions.      Pharynx: Oropharynx is clear. No pharyngeal swelling or posterior oropharyngeal erythema.   Eyes:      General: No scleral icterus.     Conjunctiva/sclera: Conjunctivae normal.   Pulmonary:      Effort: Pulmonary effort is normal. No respiratory distress.      Breath sounds: Normal breath sounds. No wheezing.   Abdominal:      General: Bowel sounds are normal. There is no distension or abdominal bruit.      Palpations: Abdomen is soft. Abdomen is not rigid. There is no mass.      Tenderness: There is no abdominal tenderness. There is no guarding or rebound. Negative signs include Lyles's sign and McBurney's sign.   Skin:     General: Skin is warm and dry.      Coloration: Skin is not jaundiced or pale.      Findings: No erythema or rash.   Neurological:      Mental Status: She is alert and oriented to person, place, and time.   Psychiatric:         Behavior: Behavior normal.         Thought Content: Thought content normal.         Judgment: Judgment normal.       Assessment:       1. Epigastric pain    2. LUQ pain    3. Nausea    4. Heartburn    5. Pharyngoesophageal dysphagia    6. Constipation, unspecified constipation type    7. History of shortness of breath        Plan:       Epigastric pain & LUQ pain  -     US Abdomen Complete; Future; Expected date: 12/09/2022  -     CBC Without Differential;  Future; Expected date: 12/09/2022  -     Lipase; Future; Expected date: 12/09/2022  -     Hepatic Function Panel; Future; Expected date: 12/09/2022  - schedule EGD, discussed procedure with patient, including risks and benefits, patient verbalized understanding  - avoid/minimize use of NSAIDs- since they can cause GI upset, bleeding and/or ulcers. If NSAID must be taken, recommend take with food.    Nausea  -     US Abdomen Complete; Future; Expected date: 12/09/2022  -     CBC Without Differential; Future; Expected date: 12/09/2022  -     Lipase; Future; Expected date: 12/09/2022  -     Hepatic Function Panel; Future; Expected date: 12/09/2022  - schedule EGD, discussed procedure with patient, including risks and benefits, patient verbalized understanding    Heartburn  - schedule EGD, discussed procedure with patient, including risks and benefits, patient verbalized understanding  - CONTINUE PROTONIX 40 MG ONCE DAILY AS DIRECTED    Pharyngoesophageal dysphagia  - schedule EGD, discussed procedure with patient and possible esophageal dilation may be performed during procedure if indicated, patient verbalized understanding  - educated patient to eat smaller more frequent meals and to eat slowly and advised to eat a soft diet.  - possible UGI/esophagram/esophageal manometry if symptoms persist    Constipation, unspecified constipation type  -     TSH; Future; Expected date: 12/09/2022  Recommend daily exercise as tolerated, adequate water intake (six 8-oz glasses of water daily), and high fiber diet. OTC fiber supplements are recommended if diet does not reach daily fiber goal (20-30 grams daily), such as Metamucil, Citrucel, or FiberCon (take as directed, separate from other oral medications by >2 hours).  -Recommend taking an OTC stool softener such as Colace as directed to avoid hard stools and straining with bowel movements PRN  -If still no improvement with these measures, call/follow-up    History of shortness  of breath  - follow-up with PCP/pulmonology for continued evaluation and management ASAP  - if experiencing symptoms of headache, chest pain, severe/persistent shortness of breath, dizziness, and/or blurred vision, recommend going to ER for further evaluation and management    Follow up in about 1 month (around 1/9/2023), or if symptoms worsen or fail to improve.      If no improvement in symptoms or symptoms worsen, call/follow-up at clinic or go to ER.        36 minutes of total time spent on the encounter, which includes face to face time and non-face to face time preparing to see the patient (e.g., review of tests), Obtaining and/or reviewing separately obtained history, Documenting clinical information in the electronic or other health record, Independently interpreting results (not separately reported) and communicating results to the patient/family/caregiver, or Care coordination (not separately reported).

## 2022-12-09 NOTE — PATIENT INSTRUCTIONS
Discharge Instructions: Eating a Soft Diet  You have been prescribed a soft diet (also called gastrointestinal soft diet or bland diet). This reduces the amount of work your digestive tract has to do. It also reduces the chance that your digestive tract will be irritated by the food you eat. A soft diet is prescribed for people with digestive problems. The diet consists of foods that are tender, mildly seasoned, and easy to digest. While on this diet, you should not eat fried or spicy foods, or raw fruits and vegetables. Also avoid alcoholic beverages.  General guidelines  Eat in a calm, relaxed atmosphere. How you eat may be as important as what you eat. Dont rush while eating. Chew your food slowly and thoroughly, and swallow slowly.  Eat small frequent meals throughout the day, but dont eat within 2 hours of bedtime.  Avoid any foods that cause discomfort.  Dont use NSAIDs (nonsteroidal anti-inflammatory drugs), such as aspirin, and ibuprofen. Also avoid medicine that contain aspirin. NSAIDs can cause ulcers and delay or prevent ulcer healing.  Use antacids as needed, but keep in mind that magnesium-containing antacids may cause diarrhea.  Foods to eat  Cream of wheat and cream of rice  Cooked white rice  Mashed potatoes, and boiled potatoes without skin  Plain pasta and noodles  Plain white crackers (such as no-salt soda crackers)  White bread  Applesauce  Cooked fruits without skins or seeds  Mild juices, such as apple and grape  Bananas  Cooked or mashed vegetables without stems and seeds  Carrots  Summer squash (zucchini, yellow squash)  Winter squash (acorn, butternut, spaghetti squash)  Cottage cheese  Mild hard or soft cheeses  Custard  Yogurt without seeds or nuts  Milk (you may need lactose-free milk)  Ice cream without seeds or nuts  Smooth peanut butter  Eggs  Fish, turkey, chicken, or other meat that is not tough or stringy  Tofu  Foods to avoid  Nuts and seeds  Snack foods, such as the  following:  Chocolate-containing snacks, candy, pastries, or cakes.  Potato chips (plain, barbecued, or other flavors)  Taco chips or nachos  Corn chips  Popcorn, popcorn cakes, or rice cakes  Crackers with nuts, seeds, or spicy seasonings  French fries  Fried or greasy foods  Whole-grain breads, rolls, and crackers  Breads and rolls with nuts, seeds, or bran  Bran and granola cereals  Berries with seeds, such as strawberries, raspberries, and blackberries  Acidic fruits, such as oranges, grapefruits, violetta, limes, and pineapples  Raw vegetables  Mild or hot peppers  Sauerkraut and pickled vegetables  Tomatoes or tomato products, such as tomato paste, tomato sauce, and tomato juice  Barbecue sauce  Spicy or flavored cheeses, such as jalapeño and black pepper cheese  Crunchy peanut butter  Dried cooked beans, such as bennett, kidney, or navy beans  The following meats:  Fried or greasy meats  Processed, spicy meats, such as sausage, jerez, ham, and lunch meats  Ribs and other meats with barbecue sauce  Tough or stringy meats, such as corned beef or beef jerky  Fluids to avoid  Alcoholic beverages  Coffee and regular teas  Geoff and other drinks with caffeine  Cranberry, orange, pineapple, and grapefruit juice  Lemonade  Vegetable juice  Whole milk, if you are lactose intolerant  Follow-up  Make a follow-up appointment with a dietitian as directed by our staff.  Date Last Reviewed: 6/21/2015 © 2000-2017 [employer]. [address] [place] [address]. All rights reserved. This information is not intended as a substitute for professional medical care. Always follow your healthcare professional's instructions.         Constipation (Adult)  Constipation means that you have bowel movements that are less frequent than usual. Stools often become very hard and difficult to pass.  Constipation is very common. At some point in life it affects almost everyone. Since everyone's bowel habits are different,  what is constipation to one person may not be to another. Your healthcare provider may do tests to diagnose constipation. It depends on what he or she finds when evaluating you.    Symptoms of constipation include:  Abdominal pain  Bloating  Vomiting  Painful bowel movements  Itching, swelling, bleeding, or pain around the anus  Causes  Constipation can have many causes. These include:  Diet low in fiber  Too much dairy  Not drinking enough liquids  Lack of exercise or physical activity. This is especially true for older adults.  Changes in lifestyle or daily routine, including pregnancy, aging, work, and travel  Frequent use or misuse of laxatives  Ignoring the urge to have a bowel movement or delaying it until later  Medicines, such as certain prescription pain medicines, iron supplements, antacids, certain antidepressants, and calcium supplements  Diseases like irritable bowel syndrome, bowel obstructions, stroke, diabetes, thyroid disease, Parkinson disease, hemorrhoids, and colon cancer  Complications  Potential complications of constipation can include:  Hemorrhoids  Rectal bleeding from hemorrhoids or anal fissures (skin tears)  Hernias  Dependency on laxatives  Chronic constipation  Fecal impaction  Bowel obstruction or perforation  Home care  All treatment should be done after talking with your healthcare provider. This is especially true if you have another medical problems, are taking prescription medicines, or are an older adult. Treatment most often involves lifestyle changes. You may also need medicines. Your healthcare provider will tell you which will work best for you. Follow the advice below to help avoid this problem in the future.  Lifestyle changes  These lifestyle changes can help prevent constipation:  Diet. Eat a high-fiber diet, with fresh fruit and vegetables, and reduce dairy intake, meats, and processed foods  Fluids. It's important to get enough fluids each day. Drink plenty of water  when you eat more fiber. If you are on diet that limits the amount of fluid you can have, talk about this with your healthcare provider.  Regular exercise. Check with your healthcare provider first.  Medications  Take any medicines as directed. Some laxatives are safe to use only every now and then. Others can be taken on a regular basis. Talk with your doctor or pharmacist if you have questions.  Prescription pain medicines can cause constipation. If you are taking this kind of medicine, ask your healthcare provider if you should also take a stool softener.  Medicines you may take to treat constipation include:  Fiber supplements  Stool softeners  Laxatives  Enemas  Rectal suppositories  Follow-up care  Follow up with your healthcare provider if symptoms don't get better in the next few days. You may need to have more tests or see a specialist.  Call 911  Call 911 if any of these occur:  Trouble breathing  Stiff, rigid abdomen that is severely painful to touch  Confusion  Fainting or loss of consciousness  Rapid heart rate  Chest pain  When to seek medical advice  Call your healthcare provider right away if any of these occur:  Fever over 100.4°F (38°C)  Failure to resume normal bowel movements  Pain in your abdomen or back gets worse  Nausea or vomiting  Swelling in your abdomen  Blood in the stool  Black, tarry stool  Involuntary weight loss  Weakness  Date Last Reviewed: 12/30/2015 © 2000-2017 [employer]. [address] [place] [address], [place] [address]. All rights reserved. This information is not intended as a substitute for professional medical care. Always follow your healthcare professional's instructions.     Epigastric Pain (Uncertain Cause)     Epigastric pain can be a sign of disease in the upper abdomen. Common causes include:  Acid reflux (stomach acid flowing up into the esophagus)  Gastritis (irritation of the stomach lining)  Peptic Ulcer Disease  Inflammation of the  pancreas  Gallstone  Infection in the gallbladder  Pain may be dull or burning. It may spread upward to the chest or to the back. There may be other symptoms such as belching, bloating, cramps or hunger pains. There may be weight loss or poor appetite, nausea or vomiting.  Since the diagnosis of your pain is not certain yet, further tests may sometimes be needed. Sometimes the doctor will treat you for the most likely condition to see if there is improvement before doing further tests.  Home care  Medicines  Antacids help neutralize the normal acids in your stomach. Examples are Maalox, Mylanta, Rolaids, and Tums. If you dont like the liquid, you can also try a chewable one. You may find one works better than another for you. Overuse can cause diarrhea or constipation.  Acid blockers (H2 blockers) decrease acid production. Examples are cimetidine (Tagamet), famotidine (Pepcid) and ranitidine (Zantac).  Acid inhibitors (PPIs) decrease acid production in a different way than the blockers. You may find they work better, but can take a little longer to take effect.  Examples are omeprazole (Prilosec), lansoprazole (Prevacid), pantoprazole (Protonix), rabeprazole (Aciphex), and esomeprazole (Nexium).  Take an antacid 30-60 minutes after eating and at bedtime, but not at the same time as an acid blocker.  Try not to take NSAIDs. Aspirin may also cause problems, but if taking it for your heart or other medical reasons, talk to your doctor before stopping it; you do not want to cause a worse problem, like a heart attack or stroke.  Diet  If certain foods seem to cause your spasm, try to avoid them.   Eat slowly and chew food well before swallowing. Symptoms of gastritis can be worsened by certain foods. Limit or avoid fatty, fried, and spicy foods, as well as coffee, chocolate, mint, and foods with high acid content such as tomatoes and citrus fruit and juices (orange, grapefruit, lemon).  Avoid alcohol, caffeine, and  tobacco, which can delay healing and worsen your problem.  Try eating smaller meals with snacks in between  Follow-up care  Follow up with your healthcare provider or as advised.  When to seek medical advice  Call your healthcare provider right away if any of the following occur:  Stomach pain worsens or moves to the right lower part of the abdomen  Chest pain appears, or if it worsens or spreads to the chest, back, neck, shoulder, or arm  Frequent vomiting (cant keep down liquids)  Blood in the stool or vomit (red or black color)  Feeling weak or dizzy, fainting, or having trouble breathing  Fever of 100.4ºF (38ºC) or higher, or as directed by your healthcare provider  Abdominal swelling  Date Last Reviewed: 9/25/2015  © 0374-8110 [employer]. [address] [place] [address], [place] [address]. All rights reserved. This information is not intended as a substitute for professional medical care. Always follow your healthcare professional's instructions.

## 2022-12-10 LAB
ERYTHROCYTE [DISTWIDTH] IN BLOOD BY AUTOMATED COUNT: 14.6 % (ref 11.5–14.5)
HCT VFR BLD AUTO: 41.6 % (ref 37–48.5)
HGB BLD-MCNC: 13.1 G/DL (ref 12–16)
MCH RBC QN AUTO: 28.4 PG (ref 27–31)
MCHC RBC AUTO-ENTMCNC: 31.5 G/DL (ref 32–36)
MCV RBC AUTO: 90 FL (ref 82–98)
PLATELET # BLD AUTO: 294 K/UL (ref 150–450)
PMV BLD AUTO: 10.8 FL (ref 9.2–12.9)
RBC # BLD AUTO: 4.61 M/UL (ref 4–5.4)
WBC # BLD AUTO: 8.5 K/UL (ref 3.9–12.7)

## 2022-12-22 ENCOUNTER — HOSPITAL ENCOUNTER (OUTPATIENT)
Dept: RADIOLOGY | Facility: HOSPITAL | Age: 27
Discharge: HOME | End: 2022-12-22
Attending: NURSE PRACTITIONER
Payer: COMMERCIAL

## 2022-12-22 DIAGNOSIS — R11.0 NAUSEA: ICD-10-CM

## 2022-12-22 DIAGNOSIS — R10.13 EPIGASTRIC PAIN: ICD-10-CM

## 2022-12-22 PROCEDURE — 76700 US EXAM ABDOM COMPLETE: CPT | Mod: TC,PO

## 2022-12-22 PROCEDURE — 76700 US ABDOMEN COMPLETE: ICD-10-PCS | Mod: 26,,, | Performed by: RADIOLOGY

## 2022-12-22 PROCEDURE — 76700 US EXAM ABDOM COMPLETE: CPT | Mod: 26,,, | Performed by: RADIOLOGY

## 2023-02-14 ENCOUNTER — TELEPHONE (OUTPATIENT)
Dept: GASTROENTEROLOGY | Facility: CLINIC | Age: 28
End: 2023-02-14
Payer: COMMERCIAL

## 2023-02-22 ENCOUNTER — TELEPHONE (OUTPATIENT)
Dept: PAIN MEDICINE | Facility: CLINIC | Age: 28
End: 2023-02-22
Payer: COMMERCIAL

## 2023-02-22 ENCOUNTER — PATIENT MESSAGE (OUTPATIENT)
Dept: PAIN MEDICINE | Facility: CLINIC | Age: 28
End: 2023-02-22
Payer: COMMERCIAL

## 2023-03-02 ENCOUNTER — OFFICE VISIT (OUTPATIENT)
Dept: URGENT CARE | Facility: CLINIC | Age: 28
End: 2023-03-02
Payer: COMMERCIAL

## 2023-03-02 VITALS
RESPIRATION RATE: 16 BRPM | SYSTOLIC BLOOD PRESSURE: 143 MMHG | DIASTOLIC BLOOD PRESSURE: 90 MMHG | TEMPERATURE: 98 F | HEART RATE: 100 BPM | OXYGEN SATURATION: 97 %

## 2023-03-02 DIAGNOSIS — R05.9 COUGH, UNSPECIFIED TYPE: ICD-10-CM

## 2023-03-02 DIAGNOSIS — J06.9 VIRAL URI WITH COUGH: Primary | ICD-10-CM

## 2023-03-02 LAB
CTP QC/QA: YES
SARS-COV-2 RDRP RESP QL NAA+PROBE: NEGATIVE

## 2023-03-02 PROCEDURE — 1160F PR REVIEW ALL MEDS BY PRESCRIBER/CLIN PHARMACIST DOCUMENTED: ICD-10-PCS | Mod: CPTII,S$GLB,, | Performed by: PHYSICIAN ASSISTANT

## 2023-03-02 PROCEDURE — 1159F PR MEDICATION LIST DOCUMENTED IN MEDICAL RECORD: ICD-10-PCS | Mod: CPTII,S$GLB,, | Performed by: PHYSICIAN ASSISTANT

## 2023-03-02 PROCEDURE — 87635: ICD-10-PCS | Mod: QW,S$GLB,, | Performed by: PHYSICIAN ASSISTANT

## 2023-03-02 PROCEDURE — 3080F DIAST BP >= 90 MM HG: CPT | Mod: CPTII,S$GLB,, | Performed by: PHYSICIAN ASSISTANT

## 2023-03-02 PROCEDURE — 99213 PR OFFICE/OUTPT VISIT, EST, LEVL III, 20-29 MIN: ICD-10-PCS | Mod: S$GLB,,, | Performed by: PHYSICIAN ASSISTANT

## 2023-03-02 PROCEDURE — 3077F SYST BP >= 140 MM HG: CPT | Mod: CPTII,S$GLB,, | Performed by: PHYSICIAN ASSISTANT

## 2023-03-02 PROCEDURE — 3077F PR MOST RECENT SYSTOLIC BLOOD PRESSURE >= 140 MM HG: ICD-10-PCS | Mod: CPTII,S$GLB,, | Performed by: PHYSICIAN ASSISTANT

## 2023-03-02 PROCEDURE — 87635 SARS-COV-2 COVID-19 AMP PRB: CPT | Mod: QW,S$GLB,, | Performed by: PHYSICIAN ASSISTANT

## 2023-03-02 PROCEDURE — 3080F PR MOST RECENT DIASTOLIC BLOOD PRESSURE >= 90 MM HG: ICD-10-PCS | Mod: CPTII,S$GLB,, | Performed by: PHYSICIAN ASSISTANT

## 2023-03-02 PROCEDURE — 99213 OFFICE O/P EST LOW 20 MIN: CPT | Mod: S$GLB,,, | Performed by: PHYSICIAN ASSISTANT

## 2023-03-02 PROCEDURE — 1159F MED LIST DOCD IN RCRD: CPT | Mod: CPTII,S$GLB,, | Performed by: PHYSICIAN ASSISTANT

## 2023-03-02 PROCEDURE — 1160F RVW MEDS BY RX/DR IN RCRD: CPT | Mod: CPTII,S$GLB,, | Performed by: PHYSICIAN ASSISTANT

## 2023-03-02 RX ORDER — ALBUTEROL SULFATE 90 UG/1
2 AEROSOL, METERED RESPIRATORY (INHALATION) EVERY 6 HOURS PRN
Qty: 18 G | Refills: 0 | Status: SHIPPED | OUTPATIENT
Start: 2023-03-02 | End: 2024-03-01

## 2023-03-02 RX ORDER — AZELASTINE 1 MG/ML
1 SPRAY, METERED NASAL 2 TIMES DAILY
Qty: 30 ML | Refills: 0 | Status: SHIPPED | OUTPATIENT
Start: 2023-03-02 | End: 2024-03-01

## 2023-03-02 RX ORDER — PREDNISONE 10 MG/1
TABLET ORAL
Qty: 11 TABLET | Refills: 0 | Status: SHIPPED | OUTPATIENT
Start: 2023-03-02

## 2023-03-02 NOTE — LETTER
March 2, 2023      Urgent Care - [place] [address], [address]  [facility] [address]  Phone: [phone]  Fax: [phone]       Patient: Renetta Durham   YOB: 1995  Date of Visit: 03/02/2023    To Whom It May Concern:    Kellie Durham  was at [facility] on 03/02/2023. The patient may return to work/school on 3/5/2023 with no restrictions. If you have any questions or concerns, or if I can be of further assistance, please do not hesitate to contact me.    Sincerely,    DAVI Herrera

## 2023-03-02 NOTE — PROGRESS NOTES
Subjective:       Patient ID: Renetta Durham is a 27 y.o. female.    Vitals:  temperature is 98.1 °F (36.7 °C). Her blood pressure is 143/90 (abnormal) and her pulse is 100. Her respiration is 16 and oxygen saturation is 97%.     Chief Complaint: Cough    Pt presents with cough, sinus genia,wheezing, fever, fatigue, headache, bodyache x 3 days.  Neg home covid test 2/28   Hx of asthma    Cough  This is a new problem. The current episode started in the past 7 days. The problem has been gradually worsening. The problem occurs constantly. The cough is Productive of sputum. Associated symptoms include a fever (subjective), headaches, myalgias, nasal congestion and postnasal drip. Pertinent negatives include no sore throat. Treatments tried: alieve,ibuprofen. Her past medical history is significant for asthma.     Constitution: Positive for fever (subjective).   HENT:  Positive for postnasal drip. Negative for sore throat.    Respiratory:  Positive for cough and asthma.    Musculoskeletal:  Positive for muscle ache.   Allergic/Immunologic: Positive for asthma.   Neurological:  Positive for headaches.     Objective:      Physical Exam   Constitutional: She does not appear ill. No distress.   HENT:   Head: Normocephalic and atraumatic.   Ears:   Right Ear: Tympanic membrane, external ear and ear canal normal.   Left Ear: Tympanic membrane, external ear and ear canal normal.   Mouth/Throat: Mucous membranes are moist. No oropharyngeal exudate or posterior oropharyngeal erythema. Oropharynx is clear.   Eyes: Conjunctivae are normal. Right eye exhibits no discharge. Left eye exhibits no discharge. Extraocular movement intact   Cardiovascular: Normal rate, regular rhythm and normal heart sounds.   No murmur heard.  Pulmonary/Chest: Effort normal and breath sounds normal. She has no wheezes. She has no rhonchi. She has no rales.   Abdominal: Normal appearance.   Musculoskeletal: Normal range of motion.         General:  Normal range of motion.   Neurological: no focal deficit. She is alert.   Skin: Skin is warm, dry and not pale. jaundice  Psychiatric: Her behavior is normal. Mood, judgment and thought content normal.   Nursing note and vitals reviewed.      Assessment:       1. Viral URI with cough    2. Cough, unspecified type          Plan:         Viral URI with cough    Cough, unspecified type  -     POCT COVID-19 Rapid Screening    Results for orders placed or performed in visit on 03/02/23   POCT COVID-19 Rapid Screening   Result Value Ref Range    POC Rapid COVID Negative Negative     Acceptable Yes         Other orders  -     azelastine (ASTELIN) 137 mcg (0.1 %) nasal spray; 1 spray (137 mcg total) by Nasal route 2 (two) times daily.  Dispense: 30 mL; Refill: 0  -     predniSONE (DELTASONE) 10 MG tablet; Take 40mg for 1 days, take 30mg for 1 days, take 20mg for 1 days, take 10mg for 2 days  Dispense: 11 tablet; Refill: 0  -     albuterol (VENTOLIN HFA) 90 mcg/actuation inhaler; Inhale 2 puffs into the lungs every 6 (six) hours as needed for Wheezing. Rescue  Dispense: 18 g; Refill: 0    Outside window for Tamiflu so no need for testing at this point.  Discussed signs/symptoms, reasons would need re-evaluation.  Mucinex as needed for congestion and cough.     Cough, Runny Nose, and the Common Cold   The Basics   Written by the doctors and editors at UpToDate   What causes cough, runny nose, and other symptoms of the common cold? -- These symptoms are usually caused by a viral infection. Lots of different viruses can take hold inside your nose, mouth, throat, or airways and cause cold symptoms.  Most people get over a cold without any lasting problems. Even so, having a cold can be uncomfortable. Also, some cold symptoms can also be caused by other illnesses, such as coronavirus 2019 (COVID-19) or the flu.  What are the symptoms of the common cold? -- The symptoms include:  Sneezing  Coughing  Sniffling and  runny nose  Sore throat  Chest congestion  In children, the common cold can also cause a fever. But adults do not usually get a fever when they have a cold. Some symptoms of the common cold can overlap with symptoms of COVID-19, although sneezing is uncommon in COVID-19.   When should I call the doctor or nurse? -- Contact your doctor or nurse if you live in an area where people have COVID-19. They will ask you questions about your symptoms and whether you might be at risk. They can tell you if you should get tested for the virus that causes COVID-19. If they think you are more likely to just have a cold, they might tell you to stay home and contact them again if your symptoms change or get worse.   You should also contact your doctor or nurse if you:  Lose your sense of taste or smell  Have a fever of more than 100.4º F (38º C) that comes with shaking chills, loss of appetite, or trouble breathing  Have a fever and also have lung disease, such as emphysema or asthma  Have a cough that lasts longer than 10 days  Have chest pain when you cough or breathe deeply, have trouble breathing, or cough up blood  If you are older than 65, or if you have any chronic medical conditions such as diabetes, you should contact your doctor or nurse any time you get a long-lasting cough.  Take your child to the emergency room if they:  Become confused or stop responding to you  Have trouble breathing or have to work hard to breathe  Contact your child's doctor or nurse if the child:  Refuses to drink anything for a long time  Is younger than 4 months  Has a fever and is not acting like themself  Has a cough that lasts for more than 2 weeks and is not getting any better  Has a stuffed or runny nose that gets worse or does not get any better after 10 days  Has red eyes or yellow goop coming out of their eyes  Has ear pain, pulls at their ears, or shows other signs of having an ear infection  What can I do to feel better? -- If you are a  teenager or an adult, you can try cough and cold medicines that you can get without a prescription. These medicines might help with your symptoms. But they won't cure your cold, or help you get well faster.  If you decide to try nonprescription cold medicines, be sure to follow the directions on the label. Do not combine 2 or more medicines that have acetaminophen in them. If you take too much acetaminophen, the drug can damage your liver. Also, if you have a heart condition, high blood pressure, or you take any prescription medicines, ask your pharmacist if it is safe to take the cold medicine you have in mind.  What should I know if my child has a cold? -- In children, the common cold is often more severe than it is in adults. It also lasts longer. Plus, children often get a fever during the first 3 days of a cold.  Are cough and cold medicines safe for children? -- If your child is younger than 6, you should not give them any cold medicines. These medicines are not safe for young children. Even if your child is older than 6, cough and cold medicines are unlikely to help.  Never give aspirin to any child younger than 18 years old. In children, aspirin can cause a life-threatening condition called Reye syndrome. When giving your child acetaminophen or other nonprescription medicines, never give more than the recommended dose.  How long will I be sick? -- Colds usually last 3 to 7 days in adults and 10 days in children, but some people have symptoms for up to 2 weeks.  Can the common cold lead to more serious problems? -- In some cases, yes. In some people having a cold can lead to:  Ear infections  Worsening of asthma symptoms  Sinus infections  Pneumonia or bronchitis (infections of the lungs)  How can I keep from getting another cold? -- The most important thing you can do is to wash your hands often with soap and water. This can also prevent the spread of other illnesses like the flu and COVID-19. The table has  instructions on how to wash your hands to prevent spreading illness (table 1).  The germs that cause the common cold can live on tables, door handles, and other surfaces for at least 2 hours. You never know when you might be touching germs. That's why it's so important to clean your hands often.  It's also important to stay away from other people when you are sick. This will help prevent the spread of illness.  All topics are updated as new evidence becomes available and our peer review process is complete.  This topic retrieved from [employer] on: Sep 21, 2021.  Topic 51225 Version 20.0  Release: 29.4.2 - C29.263  © 2021 UpToDate, Inc. and/or its affiliates. All rights reserved.  table 1: Hand washing to prevent spreading illness  Wet your hands and put soap on them    Rub your hands together for at least 20 seconds. Make sure to clean your wrists, fingernails, and in between your fingers.    Rinse your hands    Dry your hands with a paper towel that you can throw away    If you are not near a sink, you can use a hand gel to clean your hands. The gels with at least 60 percent alcohol work the best. But it is better to wash with soap and water if you can.  Graphic 302144 Version 3.0  Consumer Information Use and Disclaimer   This information is not specific medical advice and does not replace information you receive from your health care provider. This is only a brief summary of general information. It does NOT include all information about conditions, illnesses, injuries, tests, procedures, treatments, therapies, discharge instructions or life-style choices that may apply to you. You must talk with your health care provider for complete information about your health and treatment options. This information should not be used to decide whether or not to accept your health care provider's advice, instructions or recommendations. Only your health care provider has the knowledge and training to provide advice that is  right for you. The use of this information is governed by the Access [employer] End User License Agreement, available at [email or web address].The use of [employer] content is governed by the [employer] Terms of Use. ©2021 UpToDate, Inc. All rights reserved.  Copyright   © 2021 UpToDate, Inc. and/or its affiliates. All rights reserved.

## 2023-04-11 ENCOUNTER — TELEPHONE (OUTPATIENT)
Dept: OBSTETRICS AND GYNECOLOGY | Facility: CLINIC | Age: 28
End: 2023-04-11
Payer: COMMERCIAL

## 2023-04-11 NOTE — TELEPHONE ENCOUNTER
Attempted to contact patient. Left message.    ----- Message from Natasha Toussaint LPN sent at 4/11/2023  5:05 PM CDT -----  Contact: PT    ----- Message -----  From: Paula Trejo  Sent: 4/11/2023   4:02 PM CDT  To: Leonel YANE[facility] Staff    Type:  RX Refill Request    Who Called:  margo/ PT  Refill or New Rx: New RX  RX Name and Strength:    How is the patient currentlnorgestimate-ethinyl estradioL (SPRINTEC, 28,) 0.25-35 mg-mcg per tablety taking it? Take 1 tablet by mouth once daily. - Oral  Is this a 30 day or 90 day RX: 30 with refills  Preferred Pharmacy with phone number:    [id number] - [place], [place] - [address]  [address]  [facility] [address]  Phone: [phone] Fax: [phone]    Best Call Back Number:  [phone]  Additional Information: PT out of medication, has no refills left

## 2023-04-12 ENCOUNTER — PATIENT MESSAGE (OUTPATIENT)
Dept: OBSTETRICS AND GYNECOLOGY | Facility: CLINIC | Age: 28
End: 2023-04-12
Payer: COMMERCIAL

## 2023-04-13 ENCOUNTER — TELEPHONE (OUTPATIENT)
Dept: OBSTETRICS AND GYNECOLOGY | Facility: CLINIC | Age: 28
End: 2023-04-13
Payer: COMMERCIAL

## 2023-04-13 DIAGNOSIS — Z30.9 ENCOUNTER FOR CONTRACEPTIVE MANAGEMENT, UNSPECIFIED TYPE: ICD-10-CM

## 2023-04-13 NOTE — TELEPHONE ENCOUNTER
Attempted to contact patient. Left message.    ----- Message from Ginny Sharpe sent at 4/13/2023  1:38 PM CDT -----  Regarding: RX Refill Request  Contact: patient at [phone]  Type:  RX Refill Request    Who Called:  patient  Refill or New Rx:  refill  RX Name and Strength:  norgestimate-ethinyl estradioL (SPRINTEC, 28,) 0.25-35 mg-mcg per tablet 28 tablet 11 5/18/2022 5/18/2023   Sig - Route: Take 1 tablet by mouth once daily. - Oral   Sent to pharmacy as: norgestimate-ethinyl estradioL (SPRINTEC, 28,) 0.25-35 mg-mcg per tablet   E-Prescribing Status: Receipt confirmed by pharmacy (5/18/2022 11:39 AM CDT)     Preferred Pharmacy with phone number:    [id number] - [facility] [address] [place] [address]  [address] [address]  Phone: [phone] Fax: [phone]    Best Call Back Number:  [phone]    Additional Information:  Patient is trying to get medication refill. Pharmacy has faxed the order to doctor's office. Patient would like to know if she needs to be seen before prescription can be filled. Please call and advise. Thank you

## 2023-04-14 RX ORDER — NORGESTIMATE AND ETHINYL ESTRADIOL 0.25-0.035
1 KIT ORAL DAILY
Qty: 30 TABLET | Refills: 1 | Status: SHIPPED | OUTPATIENT
Start: 2023-04-14 | End: 2023-05-08

## 2023-05-01 DIAGNOSIS — R10.9 ABDOMINAL PAIN, UNSPECIFIED ABDOMINAL LOCATION: ICD-10-CM

## 2023-05-01 RX ORDER — PANTOPRAZOLE SODIUM 40 MG/1
TABLET, DELAYED RELEASE ORAL
Qty: 30 TABLET | Refills: 3 | Status: SHIPPED | OUTPATIENT
Start: 2023-05-01 | End: 2023-08-28

## 2023-05-03 ENCOUNTER — OFFICE VISIT (OUTPATIENT)
Dept: URGENT CARE | Facility: CLINIC | Age: 28
End: 2023-05-03
Payer: COMMERCIAL

## 2023-05-03 VITALS
OXYGEN SATURATION: 98 % | SYSTOLIC BLOOD PRESSURE: 135 MMHG | RESPIRATION RATE: 18 BRPM | HEIGHT: 63 IN | BODY MASS INDEX: 40.75 KG/M2 | WEIGHT: 230 LBS | HEART RATE: 104 BPM | TEMPERATURE: 99 F | DIASTOLIC BLOOD PRESSURE: 99 MMHG

## 2023-05-03 DIAGNOSIS — R11.0 NAUSEA: Primary | ICD-10-CM

## 2023-05-03 DIAGNOSIS — R50.9 FEVER, UNSPECIFIED FEVER CAUSE: ICD-10-CM

## 2023-05-03 DIAGNOSIS — R11.10 VOMITING, UNSPECIFIED VOMITING TYPE, UNSPECIFIED WHETHER NAUSEA PRESENT: ICD-10-CM

## 2023-05-03 LAB
CTP QC/QA: YES
POC MOLECULAR INFLUENZA A AGN: NEGATIVE
POC MOLECULAR INFLUENZA B AGN: NEGATIVE

## 2023-05-03 PROCEDURE — 99213 OFFICE O/P EST LOW 20 MIN: CPT | Mod: S$GLB,,, | Performed by: PHYSICIAN ASSISTANT

## 2023-05-03 PROCEDURE — 87502 INFLUENZA DNA AMP PROBE: CPT | Mod: QW,S$GLB,, | Performed by: PHYSICIAN ASSISTANT

## 2023-05-03 PROCEDURE — 87502 POCT INFLUENZA A/B MOLECULAR: ICD-10-PCS | Mod: QW,S$GLB,, | Performed by: PHYSICIAN ASSISTANT

## 2023-05-03 PROCEDURE — 99213 PR OFFICE/OUTPT VISIT, EST, LEVL III, 20-29 MIN: ICD-10-PCS | Mod: S$GLB,,, | Performed by: PHYSICIAN ASSISTANT

## 2023-05-03 RX ORDER — ONDANSETRON 4 MG/1
8 TABLET, FILM COATED ORAL EVERY 8 HOURS PRN
Qty: 30 TABLET | Refills: 1 | Status: SHIPPED | OUTPATIENT
Start: 2023-05-03

## 2023-05-03 NOTE — PROGRESS NOTES
"Subjective:      Patient ID: Renetta Durham is a 27 y.o. female.    Vitals:  height is 5' 3" (1.6 m) and weight is 104.3 kg (230 lb). Her temperature is 98.8 °F (37.1 °C). Her blood pressure is 135/99 (abnormal) and her pulse is 104. Her respiration is 18 and oxygen saturation is 98%.     Chief Complaint: Nausea (Dizziness, nausea, headache, and chills. Want to make sure it isn't flu - Entered by patient)    Patient presents today with having had had nausea, headaches, chills, upset stomach yesterday.  Patient did not take temperature yesterday however had chills and body aches.  Advil taken and helped with aches and pains, pt vomited last night x1 but not today.Pt concerned she may have the flu. No revent travels or known xposurers.    Nausea  This is a new problem. The current episode started yesterday. The problem has been gradually worsening. Associated symptoms include chills, fatigue, headaches, nausea, a sore throat and vomiting. Pertinent negatives include no abdominal pain, arthralgias, chest pain, congestion, coughing, diaphoresis, fever, joint swelling, myalgias, neck pain or rash. She has tried NSAIDs for the symptoms. The treatment provided mild relief.     Constitution: Positive for chills and fatigue. Negative for appetite change, sweating, fever, unexpected weight change and generalized weakness.   HENT:  Positive for postnasal drip and sore throat. Negative for ear pain, drooling, congestion, trouble swallowing and voice change.    Neck: Negative for neck pain, neck stiffness, painful lymph nodes and neck swelling.   Cardiovascular:  Negative for chest pain, leg swelling, palpitations, sob on exertion and passing out.   Eyes:  Negative for eye pain, eye redness, photophobia, double vision, blurred vision and eyelid swelling.   Respiratory:  Negative for chest tightness, cough, sputum production, bloody sputum, shortness of breath, stridor and wheezing.    Gastrointestinal:  Positive for nausea " and vomiting. Negative for abdominal pain, abdominal bloating, constipation, diarrhea and heartburn.   Genitourinary:  Negative for dysuria and missed menses.   Musculoskeletal:  Negative for joint pain, joint swelling, abnormal ROM of joint, back pain, muscle cramps and muscle ache.   Skin:  Negative for rash and hives.   Allergic/Immunologic: Negative for seasonal allergies, food allergies, hives, itching and sneezing.   Neurological:  Positive for headaches. Negative for dizziness, light-headedness, passing out, loss of balance, altered mental status, loss of consciousness and seizures.   Hematologic/Lymphatic: Negative for swollen lymph nodes.   Psychiatric/Behavioral:  Negative for altered mental status and nervous/anxious. The patient is not nervous/anxious.     Objective:     Physical Exam   Constitutional: She is oriented to person, place, and time. She appears well-developed.   HENT:   Head: Normocephalic and atraumatic.   Ears:   Right Ear: External ear normal.   Left Ear: External ear normal.   Nose: Nose normal.   Mouth/Throat: Mucous membranes are normal. Cobblestoning present.   Eyes: Conjunctivae and lids are normal.   Neck: Trachea normal. Neck supple.   Cardiovascular: Normal rate, regular rhythm and normal heart sounds.   Pulmonary/Chest: Effort normal and breath sounds normal. No respiratory distress.   Abdominal: Normal appearance and bowel sounds are normal. She exhibits no distension and no mass. Soft. There is no abdominal tenderness.   Musculoskeletal: Normal range of motion.         General: Normal range of motion.   Lymphadenopathy:     She has no cervical adenopathy.   Neurological: She is alert and oriented to person, place, and time. She has normal strength. Gait normal.   Skin: Skin is warm, dry, intact, not diaphoretic and not pale.   Psychiatric: Her speech is normal and behavior is normal. Judgment and thought content normal.   Nursing note and vitals reviewed.        Assessment:      1. Nausea    2. Vomiting, unspecified vomiting type, unspecified whether nausea present    3. Fever, unspecified fever cause      Results for orders placed or performed in visit on 05/03/23   POCT Influenza A/B MOLECULAR   Result Value Ref Range    POC Molecular Influenza A Ag Negative Negative, Not Reported    POC Molecular Influenza B Ag Negative Negative, Not Reported     Acceptable Yes       Plan:         Nausea  -     ondansetron (ZOFRAN) 4 MG tablet; Take 2 tablets (8 mg total) by mouth every 8 (eight) hours as needed for Nausea.  Dispense: 30 tablet; Refill: 1    Vomiting, unspecified vomiting type, unspecified whether nausea present  -     POCT Influenza A/B MOLECULAR  -     ondansetron (ZOFRAN) 4 MG tablet; Take 2 tablets (8 mg total) by mouth every 8 (eight) hours as needed for Nausea.  Dispense: 30 tablet; Refill: 1    Fever, unspecified fever cause         Patient Instructions   Patient Education       Nausea and Vomiting, Adult   About this topic   When you feel sick to your stomach, this is nausea. When you throw up, this is vomiting. Often, nausea and vomiting are caused by a virus. But they can also be caused by more serious things like an infection around the brain. The staff felt the risk of a serious cause for your nausea and vomiting is low.  If a virus is causing your nausea and vomiting, it is easy to spread from person to person. You can lower this risk by washing your hands often. Most of the time, your symptoms will go away without treatment in a few days.     What are the causes?   Many illnesses may cause you to feel sick to your stomach or to throw up. Sometimes, the illness may be related to your belly. You may have an infection like the flu or food poisoning. Your belly may be bothered from ulcers or reflux. A problem with other organs in your belly like your gallbladder, liver, or appendix may make you feel sick to your stomach. You may also feel sick if your belly  does not feel well or there is a block.  Other health problems that are not directly related to your belly may cause you the same feelings. You may feel sick to your stomach and like you need to throw up if you have motion sickness or an inner ear problem. Very bad headaches, migraines, and some drugs can make you feel this way as well. Someone who has had too much alcohol to drink or who has misused drugs may vomit or feel sick. Some people have nausea or vomiting from an accident, head injury, or as a side effect of surgery or chemo.  What are the main signs?   Having an upset stomach and throwing up are the main signs. You may also feel tired, have a fever, and a sore belly. Other signs will depend on what is causing the nausea and vomiting.  How does the doctor diagnose this health problem?   Your doctor will take your history. You will be asked questions about your problem and when it happens. You may be given an exam to see how much fluid you have in your body. Your doctor may check to see if there are signs of an infection or pain.   Your doctor may order:  Lab tests  Pregnancy test  X-rays  Ultrasound  CT or MRI scan  Endoscopy  How does the doctor treat this health problem?   The treatment for nausea and vomiting will depend on the cause. Sometimes, an exact problem is found, like an infection. It can be treated with drugs. This will also help the nausea and vomiting. Other times, you may be given intravenous (IV) fluids. Often, just getting fluids will help you feel better.  Are there other health problems to treat?   There may be other problems to treat based on the cause of your nausea and vomiting.  What drugs may be needed?   The doctor may order drugs to:  Stop the vomiting  Lower fever  Help an upset stomach  What problems could happen?   Too much fluid loss. This is dehydration.  Weight loss  What can be done to prevent this health problem?   Wash your hands often with soap and water for at least 20  seconds, especially after coughing or sneezing. Alcohol-based hand sanitizers also work to kill germs.  If you are sick, cover your mouth and nose with tissue when coughing or sneezing. You can also cough into your elbow. Throw away tissues in the trash and wash your hands after touching used tissues.  Do not get close to, hug, or kiss people who are sick.  Avoid sharing your towels, tissues, food, or drink with anyone who is sick.  Clean things you handle often like door handles, remotes, toys, and phones. Wipe them with a disinfectant.  Stay away from crowded places.  Last Reviewed Date   2021-06-18  Consumer Information Use and Disclaimer   This information is not specific medical advice and does not replace information you receive from your health care provider. This is only a brief summary of general information. It does NOT include all information about conditions, illnesses, injuries, tests, procedures, treatments, therapies, discharge instructions or life-style choices that may apply to you. You must talk with your health care provider for complete information about your health and treatment options. This information should not be used to decide whether or not to accept your health care providers advice, instructions or recommendations. Only your health care provider has the knowledge and training to provide advice that is right for you.  Copyright   Copyright © 2021 UpToDate, Inc. and its affiliates and/or licensors. All rights reserved.   Discussed with patient viral versus bacterial infection.  Patient advised to increase fluids.  Alternate Motrin and Tylenol every 4 hours.  Monitor for increased fever or worsening of symptoms.  Follow-up with PCP in 1 week.  Return to clinic if necessary.

## 2023-05-03 NOTE — LETTER
May 3, 2023      [facility] - [place] [address], [address]  [facility] [address]  Phone: [phone]  Fax: [phone]       Patient: eRnetta Durham   YOB: 1995  Date of Visit: 05/03/2023    To Whom It May Concern:    Kellie Durham  was at [facility] on 05/03/2023. The patient may return to work/school on 05/04/2023 with no restrictions. If you have any questions or concerns, or if I can be of further assistance, please do not hesitate to contact me.    Sincerely,    Rafa Palafox MA

## 2023-05-03 NOTE — LETTER
May 3, 2023      [facility] - [place] [address], [address]  [facility] [address]  Phone: [phone]  Fax: [phone]       Patient: Renetta Durham   YOB: 1995  Date of Visit: 05/03/2023    To Whom It May Concern:    Kellie Durham  was at [facility] on 05/03/2023. Patient may return to work/school on 05/04/23 restrictions. If you have any questions or concerns, or if I can be of further assistance, please do not hesitate to contact me.    Sincerely,        DAVI Merrill

## 2023-05-03 NOTE — PATIENT INSTRUCTIONS
Patient Education       Nausea and Vomiting, Adult   About this topic   When you feel sick to your stomach, this is nausea. When you throw up, this is vomiting. Often, nausea and vomiting are caused by a virus. But they can also be caused by more serious things like an infection around the brain. The staff felt the risk of a serious cause for your nausea and vomiting is low.  If a virus is causing your nausea and vomiting, it is easy to spread from person to person. You can lower this risk by washing your hands often. Most of the time, your symptoms will go away without treatment in a few days.     What are the causes?   Many illnesses may cause you to feel sick to your stomach or to throw up. Sometimes, the illness may be related to your belly. You may have an infection like the flu or food poisoning. Your belly may be bothered from ulcers or reflux. A problem with other organs in your belly like your gallbladder, liver, or appendix may make you feel sick to your stomach. You may also feel sick if your belly does not feel well or there is a block.  Other health problems that are not directly related to your belly may cause you the same feelings. You may feel sick to your stomach and like you need to throw up if you have motion sickness or an inner ear problem. Very bad headaches, migraines, and some drugs can make you feel this way as well. Someone who has had too much alcohol to drink or who has misused drugs may vomit or feel sick. Some people have nausea or vomiting from an accident, head injury, or as a side effect of surgery or chemo.  What are the main signs?   Having an upset stomach and throwing up are the main signs. You may also feel tired, have a fever, and a sore belly. Other signs will depend on what is causing the nausea and vomiting.  How does the doctor diagnose this health problem?   Your doctor will take your history. You will be asked questions about your problem and when it happens. You may be  given an exam to see how much fluid you have in your body. Your doctor may check to see if there are signs of an infection or pain.   Your doctor may order:  Lab tests  Pregnancy test  X-rays  Ultrasound  CT or MRI scan  Endoscopy  How does the doctor treat this health problem?   The treatment for nausea and vomiting will depend on the cause. Sometimes, an exact problem is found, like an infection. It can be treated with drugs. This will also help the nausea and vomiting. Other times, you may be given intravenous (IV) fluids. Often, just getting fluids will help you feel better.  Are there other health problems to treat?   There may be other problems to treat based on the cause of your nausea and vomiting.  What drugs may be needed?   The doctor may order drugs to:  Stop the vomiting  Lower fever  Help an upset stomach  What problems could happen?   Too much fluid loss. This is dehydration.  Weight loss  What can be done to prevent this health problem?   Wash your hands often with soap and water for at least 20 seconds, especially after coughing or sneezing. Alcohol-based hand sanitizers also work to kill germs.  If you are sick, cover your mouth and nose with tissue when coughing or sneezing. You can also cough into your elbow. Throw away tissues in the trash and wash your hands after touching used tissues.  Do not get close to, hug, or kiss people who are sick.  Avoid sharing your towels, tissues, food, or drink with anyone who is sick.  Clean things you handle often like door handles, remotes, toys, and phones. Wipe them with a disinfectant.  Stay away from crowded places.  Last Reviewed Date   2021-06-18  Consumer Information Use and Disclaimer   This information is not specific medical advice and does not replace information you receive from your health care provider. This is only a brief summary of general information. It does NOT include all information about conditions, illnesses, injuries, tests,  procedures, treatments, therapies, discharge instructions or life-style choices that may apply to you. You must talk with your health care provider for complete information about your health and treatment options. This information should not be used to decide whether or not to accept your health care providers advice, instructions or recommendations. Only your health care provider has the knowledge and training to provide advice that is right for you.  Copyright   Copyright © 2021 [employer] and its affiliates and/or licensors. All rights reserved.   Discussed with patient viral versus bacterial infection.  Patient advised to increase fluids.  Alternate Motrin and Tylenol every 4 hours.  Monitor for increased fever or worsening of symptoms.  Follow-up with PCP in 1 week.  Return to clinic if necessary.

## 2023-05-06 DIAGNOSIS — Z30.9 ENCOUNTER FOR CONTRACEPTIVE MANAGEMENT, UNSPECIFIED TYPE: ICD-10-CM

## 2023-05-08 RX ORDER — NORGESTIMATE AND ETHINYL ESTRADIOL 0.25-0.035
KIT ORAL
Qty: 28 TABLET | Refills: 1 | Status: SHIPPED | OUTPATIENT
Start: 2023-05-08 | End: 2023-06-19

## 2023-06-18 DIAGNOSIS — Z30.9 ENCOUNTER FOR CONTRACEPTIVE MANAGEMENT, UNSPECIFIED TYPE: ICD-10-CM

## 2023-06-19 RX ORDER — NORGESTIMATE AND ETHINYL ESTRADIOL 0.25-0.035
KIT ORAL
Qty: 28 TABLET | Refills: 1 | Status: SHIPPED | OUTPATIENT
Start: 2023-06-19

## 2023-06-26 RX ORDER — TIZANIDINE 4 MG/1
TABLET ORAL
Qty: 60 TABLET | Refills: 2 | Status: SHIPPED | OUTPATIENT
Start: 2023-06-26

## 2023-06-27 ENCOUNTER — TELEPHONE (OUTPATIENT)
Dept: FAMILY MEDICINE | Facility: CLINIC | Age: 28
End: 2023-06-27
Payer: COMMERCIAL

## 2023-06-27 NOTE — TELEPHONE ENCOUNTER
I have another message on this  If she has a bite and worried about rabies, she would go to the ER  As far as booster for rabies, maybe ID, I am sorry I have never ordered that

## 2023-06-27 NOTE — TELEPHONE ENCOUNTER
Pt had rabies series in 2019. Pt was bit by racoon yesterday on her hand , top part of hand near her left pinky , skin was broken . Pt asking if she needs to repeat rabies series . Pls advise

## 2023-06-27 NOTE — TELEPHONE ENCOUNTER
----- Message from Ba Alcazar sent at 6/27/2023  1:10 PM CDT -----  Contact: pt  Type: Needs Medical Advice  Who Called:  pt     Best Call Back Number: [phone]    Additional Information: pt would like a update on if she needs a booster for rabies shot. Please advise.

## 2023-06-27 NOTE — TELEPHONE ENCOUNTER
Pt completed 4 doses of rabies vax tx in 2019 following raccoon bite  Inquiring if she is supposed to receive a booster?

## 2023-06-27 NOTE — TELEPHONE ENCOUNTER
----- Message from Tomasa Solo sent at 6/26/2023 12:34 PM CDT -----  Contact: self  Type: Needs Medical Advice  Who Called: Patient   Best Call Back Number: [phone]  Additional Information: Pt states she needs to get in to get a booster rabies shot and if she needs antibiotics as well.. Pt got bit by a raccoon today. Plz call and advise. Thanks

## 2023-08-28 DIAGNOSIS — R10.9 ABDOMINAL PAIN, UNSPECIFIED ABDOMINAL LOCATION: ICD-10-CM

## 2023-08-28 RX ORDER — PANTOPRAZOLE SODIUM 40 MG/1
TABLET, DELAYED RELEASE ORAL
Qty: 30 TABLET | Refills: 0 | Status: SHIPPED | OUTPATIENT
Start: 2023-08-28

## 2024-07-09 ENCOUNTER — PATIENT MESSAGE (OUTPATIENT)
Dept: ADMINISTRATIVE | Facility: HOSPITAL | Age: 29
End: 2024-07-09
Payer: COMMERCIAL

## 2025-02-10 ENCOUNTER — PATIENT OUTREACH (OUTPATIENT)
Dept: ADMINISTRATIVE | Facility: HOSPITAL | Age: 30
End: 2025-02-10

## 2025-02-10 NOTE — PROGRESS NOTES
Population Health Chart Review & Patient Outreach Details      Additional [facility] Health Notes:               Updates Requested / Reviewed:      Updated Care Coordination Note and Immunizations Reconciliation Completed or Queried: [place]         Health Maintenance Topics Overdue:      [facility] Score: 0     Patient is not due for any topics at this time.                       Health Maintenance Topic(s) Outreach Outcomes & Actions Taken:    Primary Care Appt - Outreach Outcomes & Actions Taken  : portal message sent

## (undated) DEVICE — APPLICATOR CHLORAPREP CLR 10.5

## (undated) DEVICE — GLOVE SURGICAL LATEX SZ 7

## (undated) DEVICE — MARKER SKIN STND TIP BLUE BARR

## (undated) DEVICE — NDL TUOHY EPIDURAL 20G X 3.5

## (undated) DEVICE — SYR GLASS 5CC LUER LOK

## (undated) DEVICE — TRAY NERVE BLOCK